# Patient Record
Sex: FEMALE | Race: WHITE | NOT HISPANIC OR LATINO | Employment: PART TIME | URBAN - METROPOLITAN AREA
[De-identification: names, ages, dates, MRNs, and addresses within clinical notes are randomized per-mention and may not be internally consistent; named-entity substitution may affect disease eponyms.]

---

## 2021-01-05 ENCOUNTER — HOSPITAL ENCOUNTER (EMERGENCY)
Facility: HOSPITAL | Age: 46
Discharge: HOME | End: 2021-01-05
Attending: EMERGENCY MEDICINE | Admitting: EMERGENCY MEDICINE
Payer: COMMERCIAL

## 2021-01-05 ENCOUNTER — APPOINTMENT (EMERGENCY)
Dept: CT IMAGING | Facility: HOSPITAL | Age: 46
End: 2021-01-05
Payer: COMMERCIAL

## 2021-01-05 VITALS
BODY MASS INDEX: 33.01 KG/M2 | RESPIRATION RATE: 18 BRPM | DIASTOLIC BLOOD PRESSURE: 71 MMHG | HEART RATE: 74 BPM | TEMPERATURE: 98.2 F | HEIGHT: 61 IN | OXYGEN SATURATION: 99 % | WEIGHT: 174.82 LBS | SYSTOLIC BLOOD PRESSURE: 115 MMHG

## 2021-01-05 DIAGNOSIS — R10.30 LOWER ABDOMINAL PAIN: Primary | ICD-10-CM

## 2021-01-05 DIAGNOSIS — R11.0 NAUSEA: ICD-10-CM

## 2021-01-05 DIAGNOSIS — K43.9 VENTRAL HERNIA WITHOUT OBSTRUCTION OR GANGRENE: ICD-10-CM

## 2021-01-05 LAB
ALBUMIN SERPL BCP-MCNC: 3.5 G/DL (ref 3.5–5)
ALP SERPL-CCNC: 80 U/L (ref 46–116)
ALT SERPL W P-5'-P-CCNC: 16 U/L (ref 12–78)
ANION GAP SERPL CALCULATED.3IONS-SCNC: 9 MMOL/L (ref 4–13)
APTT PPP: 33 SECONDS (ref 23–37)
AST SERPL W P-5'-P-CCNC: 10 U/L (ref 5–45)
BASOPHILS # BLD AUTO: 0.04 THOUSANDS/ΜL (ref 0–0.1)
BASOPHILS NFR BLD AUTO: 1 % (ref 0–1)
BILIRUB SERPL-MCNC: 0.2 MG/DL (ref 0.2–1)
BILIRUB UR QL STRIP: NEGATIVE
BUN SERPL-MCNC: 13 MG/DL (ref 5–25)
CALCIUM SERPL-MCNC: 9.1 MG/DL (ref 8.3–10.1)
CHLORIDE SERPL-SCNC: 105 MMOL/L (ref 100–108)
CLARITY UR: CLEAR
CO2 SERPL-SCNC: 26 MMOL/L (ref 21–32)
COLOR UR: YELLOW
CREAT SERPL-MCNC: 1.16 MG/DL (ref 0.6–1.3)
EOSINOPHIL # BLD AUTO: 0.15 THOUSAND/ΜL (ref 0–0.61)
EOSINOPHIL NFR BLD AUTO: 2 % (ref 0–6)
ERYTHROCYTE [DISTWIDTH] IN BLOOD BY AUTOMATED COUNT: 13.4 % (ref 11.6–15.1)
GFR SERPL CREATININE-BSD FRML MDRD: 56 ML/MIN/1.73SQ M
GLUCOSE SERPL-MCNC: 89 MG/DL (ref 65–140)
GLUCOSE UR STRIP-MCNC: NEGATIVE MG/DL
HCT VFR BLD AUTO: 40.7 % (ref 34.8–46.1)
HGB BLD-MCNC: 12.9 G/DL (ref 11.5–15.4)
HGB UR QL STRIP.AUTO: NEGATIVE
IMM GRANULOCYTES # BLD AUTO: 0.03 THOUSAND/UL (ref 0–0.2)
IMM GRANULOCYTES NFR BLD AUTO: 0 % (ref 0–2)
INR PPP: 0.94 (ref 0.84–1.19)
KETONES UR STRIP-MCNC: NEGATIVE MG/DL
LACTATE SERPL-SCNC: 0.7 MMOL/L (ref 0.5–2)
LEUKOCYTE ESTERASE UR QL STRIP: NEGATIVE
LIPASE SERPL-CCNC: 335 U/L (ref 73–393)
LYMPHOCYTES # BLD AUTO: 2.34 THOUSANDS/ΜL (ref 0.6–4.47)
LYMPHOCYTES NFR BLD AUTO: 30 % (ref 14–44)
MAGNESIUM SERPL-MCNC: 1.9 MG/DL (ref 1.6–2.6)
MCH RBC QN AUTO: 28.6 PG (ref 26.8–34.3)
MCHC RBC AUTO-ENTMCNC: 31.7 G/DL (ref 31.4–37.4)
MCV RBC AUTO: 90 FL (ref 82–98)
MONOCYTES # BLD AUTO: 0.49 THOUSAND/ΜL (ref 0.17–1.22)
MONOCYTES NFR BLD AUTO: 6 % (ref 4–12)
NEUTROPHILS # BLD AUTO: 4.72 THOUSANDS/ΜL (ref 1.85–7.62)
NEUTS SEG NFR BLD AUTO: 61 % (ref 43–75)
NITRITE UR QL STRIP: NEGATIVE
NRBC BLD AUTO-RTO: 0 /100 WBCS
PH UR STRIP.AUTO: 5.5 [PH]
PLATELET # BLD AUTO: 262 THOUSANDS/UL (ref 149–390)
PMV BLD AUTO: 10.3 FL (ref 8.9–12.7)
POTASSIUM SERPL-SCNC: 3.9 MMOL/L (ref 3.5–5.3)
PROT SERPL-MCNC: 6.9 G/DL (ref 6.4–8.2)
PROT UR STRIP-MCNC: NEGATIVE MG/DL
PROTHROMBIN TIME: 12.7 SECONDS (ref 11.6–14.5)
RBC # BLD AUTO: 4.51 MILLION/UL (ref 3.81–5.12)
SODIUM SERPL-SCNC: 140 MMOL/L (ref 136–145)
SP GR UR STRIP.AUTO: <=1.005 (ref 1–1.03)
UROBILINOGEN UR QL STRIP.AUTO: 0.2 E.U./DL
WBC # BLD AUTO: 7.77 THOUSAND/UL (ref 4.31–10.16)

## 2021-01-05 PROCEDURE — 99285 EMERGENCY DEPT VISIT HI MDM: CPT | Performed by: EMERGENCY MEDICINE

## 2021-01-05 PROCEDURE — 80053 COMPREHEN METABOLIC PANEL: CPT | Performed by: EMERGENCY MEDICINE

## 2021-01-05 PROCEDURE — 99284 EMERGENCY DEPT VISIT MOD MDM: CPT

## 2021-01-05 PROCEDURE — 85730 THROMBOPLASTIN TIME PARTIAL: CPT | Performed by: EMERGENCY MEDICINE

## 2021-01-05 PROCEDURE — 81003 URINALYSIS AUTO W/O SCOPE: CPT | Performed by: EMERGENCY MEDICINE

## 2021-01-05 PROCEDURE — 83690 ASSAY OF LIPASE: CPT | Performed by: EMERGENCY MEDICINE

## 2021-01-05 PROCEDURE — 83605 ASSAY OF LACTIC ACID: CPT | Performed by: EMERGENCY MEDICINE

## 2021-01-05 PROCEDURE — 83735 ASSAY OF MAGNESIUM: CPT | Performed by: EMERGENCY MEDICINE

## 2021-01-05 PROCEDURE — 96375 TX/PRO/DX INJ NEW DRUG ADDON: CPT

## 2021-01-05 PROCEDURE — 74177 CT ABD & PELVIS W/CONTRAST: CPT

## 2021-01-05 PROCEDURE — 96361 HYDRATE IV INFUSION ADD-ON: CPT

## 2021-01-05 PROCEDURE — 36415 COLL VENOUS BLD VENIPUNCTURE: CPT | Performed by: EMERGENCY MEDICINE

## 2021-01-05 PROCEDURE — 96376 TX/PRO/DX INJ SAME DRUG ADON: CPT

## 2021-01-05 PROCEDURE — 85025 COMPLETE CBC W/AUTO DIFF WBC: CPT | Performed by: EMERGENCY MEDICINE

## 2021-01-05 PROCEDURE — 96374 THER/PROPH/DIAG INJ IV PUSH: CPT

## 2021-01-05 PROCEDURE — 85610 PROTHROMBIN TIME: CPT | Performed by: EMERGENCY MEDICINE

## 2021-01-05 PROCEDURE — G1004 CDSM NDSC: HCPCS

## 2021-01-05 RX ORDER — ONDANSETRON 2 MG/ML
4 INJECTION INTRAMUSCULAR; INTRAVENOUS ONCE
Status: COMPLETED | OUTPATIENT
Start: 2021-01-05 | End: 2021-01-05

## 2021-01-05 RX ORDER — PROMETHAZINE HYDROCHLORIDE 25 MG/ML
25 INJECTION, SOLUTION INTRAMUSCULAR; INTRAVENOUS ONCE
Status: COMPLETED | OUTPATIENT
Start: 2021-01-05 | End: 2021-01-05

## 2021-01-05 RX ORDER — MAGNESIUM HYDROXIDE/ALUMINUM HYDROXICE/SIMETHICONE 120; 1200; 1200 MG/30ML; MG/30ML; MG/30ML
30 SUSPENSION ORAL ONCE
Status: COMPLETED | OUTPATIENT
Start: 2021-01-05 | End: 2021-01-05

## 2021-01-05 RX ORDER — MORPHINE SULFATE 4 MG/ML
4 INJECTION, SOLUTION INTRAMUSCULAR; INTRAVENOUS ONCE
Status: COMPLETED | OUTPATIENT
Start: 2021-01-05 | End: 2021-01-05

## 2021-01-05 RX ORDER — DICYCLOMINE HCL 20 MG
20 TABLET ORAL 2 TIMES DAILY
Qty: 20 TABLET | Refills: 0 | Status: SHIPPED | OUTPATIENT
Start: 2021-01-05 | End: 2021-02-17

## 2021-01-05 RX ORDER — LIDOCAINE HYDROCHLORIDE 20 MG/ML
15 SOLUTION OROPHARYNGEAL ONCE
Status: COMPLETED | OUTPATIENT
Start: 2021-01-05 | End: 2021-01-05

## 2021-01-05 RX ORDER — ONDANSETRON 4 MG/1
4 TABLET, ORALLY DISINTEGRATING ORAL EVERY 6 HOURS PRN
Qty: 20 TABLET | Refills: 0 | Status: SHIPPED | OUTPATIENT
Start: 2021-01-05 | End: 2021-02-17

## 2021-01-05 RX ORDER — DICYCLOMINE HCL 20 MG
20 TABLET ORAL ONCE
Status: COMPLETED | OUTPATIENT
Start: 2021-01-05 | End: 2021-01-05

## 2021-01-05 RX ADMIN — PROMETHAZINE HYDROCHLORIDE 25 MG: 25 INJECTION INTRAMUSCULAR; INTRAVENOUS at 16:37

## 2021-01-05 RX ADMIN — LIDOCAINE HYDROCHLORIDE 15 ML: 20 SOLUTION ORAL; TOPICAL at 17:21

## 2021-01-05 RX ADMIN — MORPHINE SULFATE 4 MG: 4 INJECTION INTRAVENOUS at 14:53

## 2021-01-05 RX ADMIN — ONDANSETRON 4 MG: 2 INJECTION INTRAMUSCULAR; INTRAVENOUS at 14:53

## 2021-01-05 RX ADMIN — MORPHINE SULFATE 4 MG: 4 INJECTION INTRAVENOUS at 16:37

## 2021-01-05 RX ADMIN — SODIUM CHLORIDE 1000 ML: 0.9 INJECTION, SOLUTION INTRAVENOUS at 14:54

## 2021-01-05 RX ADMIN — ALUMINA, MAGNESIA, AND SIMETHICONE ORAL SUSPENSION REGULAR STRENGTH 30 ML: 1200; 1200; 120 SUSPENSION ORAL at 17:21

## 2021-01-05 RX ADMIN — IOHEXOL 100 ML: 350 INJECTION, SOLUTION INTRAVENOUS at 15:47

## 2021-01-05 RX ADMIN — DICYCLOMINE HYDROCHLORIDE 20 MG: 20 TABLET ORAL at 17:21

## 2021-01-05 NOTE — ED PROVIDER NOTES
Pt Name: Chet Castleman  MRN: [id number]  [place] 1975  Age/Sex: 50 y o  female  Date of evaluation: 2021  PCP: No primary care provider on file  CHIEF COMPLAINT    Chief Complaint   Patient presents with    Abdominal Pain     reports "nasty smelly burps 1 week ago then sharp pains in my stomach to my back for 4-5 days "         HPI    50 y o  female presenting with abdominal pain  Patient states 1 week ago she started having frequent sharps that smelled foul, and over the last 4-5 days started having sharp pains in her abdomen, points towards her lower abdomen  And recently has started shooting through into her back  Reports nausea, no vomiting  Poor appetite, has been drinking liquids, but states she has not been staying hydrated  No urinary symptoms, no constipation or diarrhea, no blood in her stool or urine  No fevers or chills  No sick contacts  Patient has a history of hysterectomy, had complication with intestinal perforation then that was repaired, this was approximately 3 years ago  States she was septic at that time and her current symptoms feel similar  Past Medical and Surgical History    Past Medical History:   Diagnosis Date    Bladder perforation, intraoperative     Intestine, perforation ([address] )     intra op    Sepsis ([facility] [address] )        Past Surgical History:   Procedure Laterality Date     SECTION      HYSTERECTOMY         History reviewed  No pertinent family history  Social History     Tobacco Use    Smoking status: Current Every Day Smoker     Packs/day: 0 50    Smokeless tobacco: Never Used   Substance Use Topics    Alcohol use: Not Currently    Drug use: Never           Allergies    No Known Allergies    Home Medications    Prior to Admission medications    Not on File           Review of Systems    Review of Systems   Constitutional: Negative for chills and fever  HENT: Negative for rhinorrhea and sore throat      Eyes: Negative for pain and visual disturbance  Respiratory: Negative for cough and shortness of breath  Cardiovascular: Negative for chest pain and leg swelling  Gastrointestinal: Positive for abdominal pain and nausea  Negative for blood in stool, constipation, diarrhea and vomiting  Genitourinary: Negative for dysuria and hematuria  Musculoskeletal: Positive for back pain  Negative for myalgias  Skin: Negative for rash and wound  Neurological: Negative for syncope and headaches  Physical Exam      ED Triage Vitals   Temperature Pulse Respirations Blood Pressure SpO2   01/05/21 1413 01/05/21 1413 01/05/21 1413 01/05/21 1413 01/05/21 1413   98 2 °F (36 8 °C) 82 18 108/75 98 %      Temp Source Heart Rate Source Patient Position - Orthostatic VS BP Location FiO2 (%)   01/05/21 1413 01/05/21 1413 01/05/21 1413 01/05/21 1413 --   Oral Monitor Sitting Left arm       Pain Score       01/05/21 1453       7               Physical Exam  Constitutional:       General: She is not in acute distress  Appearance: She is not ill-appearing  HENT:      Head: Normocephalic and atraumatic  Nose: Nose normal       Mouth/Throat:      Comments: Deferred due to COVID-19 transmission risk  Eyes:      Extraocular Movements: Extraocular movements intact  Pupils: Pupils are equal, round, and reactive to light  Neck:      Musculoskeletal: Normal range of motion and neck supple  Cardiovascular:      Rate and Rhythm: Normal rate and regular rhythm  Pulmonary:      Effort: No respiratory distress  Breath sounds: Normal breath sounds  No wheezing  Abdominal:      General: There is no distension  Palpations: Abdomen is soft  Comments: Lower abdominal tenderness to palpation, left upper quadrant tenderness to palpation as well   Musculoskeletal: Normal range of motion  General: No swelling or deformity  Skin:     General: Skin is warm  Findings: No erythema     Neurological:      Mental Status: She is alert and oriented to person, place, and time  Mental status is at baseline                Diagnostic Results      Labs:    Results Reviewed     Procedure Component Value Units Date/Time    UA (URINE) with reflex to Scope [[id number]] Collected: 01/05/21 1637    Lab Status: Final result Specimen: Urine, Clean Catch Updated: 01/05/21 1649     Color, UA Yellow     Clarity, UA Clear     Specific Gravity, UA <=1 005     pH, UA 5 5     Leukocytes, UA Negative     Nitrite, UA Negative     Protein, UA Negative mg/dl      Glucose, UA Negative mg/dl      Ketones, UA Negative mg/dl      Urobilinogen, UA 0 2 E U /dl      Bilirubin, UA Negative     Blood, UA Negative    Lipase [[id number]]  (Normal) Collected: 01/05/21 1450    Lab Status: Final result Specimen: Blood from Arm, Left Updated: 01/05/21 1521     Lipase 335 u/L     Comprehensive metabolic panel [[id number]] Collected: 01/05/21 1450    Lab Status: Final result Specimen: Blood from Arm, Left Updated: 01/05/21 1521     Sodium 140 mmol/L      Potassium 3 9 mmol/L      Chloride 105 mmol/L      CO2 26 mmol/L      ANION GAP 9 mmol/L      BUN 13 mg/dL      Creatinine 1 16 mg/dL      Glucose 89 mg/dL      Calcium 9 1 mg/dL      AST 10 U/L      ALT 16 U/L      Alkaline Phosphatase 80 U/L      Total Protein 6 9 g/dL      Albumin 3 5 g/dL      Total Bilirubin 0 20 mg/dL      eGFR 56 ml/min/1 73sq m     Narrative:      Ana guidelines for Chronic Kidney Disease (CKD):     Stage 1 with normal or high GFR (GFR > 90 mL/min/1 73 square meters)    Stage 2 Mild CKD (GFR = 60-89 mL/min/1 73 square meters)    Stage 3A Moderate CKD (GFR = 45-59 mL/min/1 73 square meters)    Stage 3B Moderate CKD (GFR = 30-44 mL/min/1 73 square meters)    Stage 4 Severe CKD (GFR = 15-29 mL/min/1 73 square meters)    Stage 5 End Stage CKD (GFR <15 mL/min/1 73 square meters)  Note: GFR calculation is accurate only with a steady state creatinine    Magnesium [[id number]] (Normal) Collected: 01/05/21 1450    Lab Status: Final result Specimen: Blood from Arm, Left Updated: 01/05/21 1521     Magnesium 1 9 mg/dL     Lactic acid [[id number]]  (Normal) Collected: 01/05/21 1450    Lab Status: Final result Specimen: Blood from Arm, Left Updated: 01/05/21 1514     LACTIC ACID 0 7 mmol/L     Narrative:      Result may be elevated if tourniquet was used during collection  Protime-INR [[id number]]  (Normal) Collected: 01/05/21 1450    Lab Status: Final result Specimen: Blood from Arm, Left Updated: 01/05/21 1505     Protime 12 7 seconds      INR 0 94    APTT [[id number]]  (Normal) Collected: 01/05/21 1450    Lab Status: Final result Specimen: Blood from Arm, Left Updated: 01/05/21 1505     PTT 33 seconds     CBC and differential [[id number]] Collected: 01/05/21 1450    Lab Status: Final result Specimen: Blood from Arm, Left Updated: 01/05/21 1456     WBC 7 77 Thousand/uL      RBC 4 51 Million/uL      Hemoglobin 12 9 g/dL      Hematocrit 40 7 %      MCV 90 fL      MCH 28 6 pg      MCHC 31 7 g/dL      RDW 13 4 %      MPV 10 3 fL      Platelets 384 Thousands/uL      nRBC 0 /100 WBCs      Neutrophils Relative 61 %      Immat GRANS % 0 %      Lymphocytes Relative 30 %      Monocytes Relative 6 %      Eosinophils Relative 2 %      Basophils Relative 1 %      Neutrophils Absolute 4 72 Thousands/µL      Immature Grans Absolute 0 03 Thousand/uL      Lymphocytes Absolute 2 34 Thousands/µL      Monocytes Absolute 0 49 Thousand/µL      Eosinophils Absolute 0 15 Thousand/µL      Basophils Absolute 0 04 Thousands/µL           All labs reviewed and utilized in the medical decision making process    Radiology:    CT abdomen pelvis with contrast   Final Result      1  No acute CT finding within abdomen or pelvis  2   Moderate to large colonic stool burden  3   Fat-containing heterogeneous mesenteric mass in the anterior mid lower abdomen along the undersurface of rectus muscle    There is no prior outside imaging available to assess interval change  Per outside imaging reports available in Epic this lesion    has been previously biopsied  Correlate with pathology report  4   Large left paramedian lower abdominal wall fat-containing ventral hernia  Workstation performed: [id number]             All radiology studies independently viewed by me and interpreted by the radiologist     Procedure    Procedures        MDM    Vitals reassuring, concern for acute intra-abdominal pathology, especially with her history  Pancreatitis and gastritis are also considered  Give IV morphine IV Zofran for pain  Will give IV fluids  Will obtain blood work, urinalysis, CT abdomen/pelvis  ED Course as of Jan 05 1729   Tue Jan 05, 2021   1657  Fat-containing heterogeneous density is seen in the anterior mid lower abdomen along undersurface of rectus muscle  It measures approximately 3 x 5 1 x 6 7 cm (AP by transverse by craniocaudal)  Left paramedian lower abdominal wall fat-containing ventral hernia with fascial defect measuring approximately 2 8 cm  CT abdomen pelvis with contrast   1658 On care everywhere, the mass measured "5 5 cm x 3 6 cm x 7 8 cm" on 11/27/2019  Therefore size is similar  And per surgery note on 1/13/2020, the mass was biopsied, and is benign  She was advised to have her hernia repaired, and per the note they suggested the mass could be excised during the same operation  [phone] Patient updated with findings, states she does not have a surgeon at [facility], would like surgery referral here  She is feeling much better  Will prescribe dicyclomine and Zofran  Follow-up with surgery  ED return precautions discussed, patient verbalized understanding and is in agreement with this plan             Medications   morphine (PF) 4 mg/mL injection 4 mg (4 mg Intravenous Given 1/5/21 1453)   ondansetron (ZOFRAN) injection 4 mg (4 mg Intravenous Given 1/5/21 1453)   sodium chloride 0 9 % bolus 1,000 mL (0 mL Intravenous Stopped 1/5/21 1612)   iohexol (OMNIPAQUE) 350 MG/ML injection (MULTI-DOSE) 100 mL (100 mL Intravenous Given 1/5/21 1547)   morphine (PF) 4 mg/mL injection 4 mg (4 mg Intravenous Given 1/5/21 1637)   promethazine (PHENERGAN) injection 25 mg (25 mg Intravenous Given 1/5/21 1637)   aluminum-magnesium hydroxide-simethicone (MYLANTA) oral suspension 30 mL (30 mL Oral Given 1/5/21 1721)   Lidocaine Viscous HCl (XYLOCAINE) 2 % mucosal solution 15 mL (15 mL Swish & Swallow Given 1/5/21 1721)   dicyclomine (BENTYL) tablet 20 mg (20 mg Oral Given 1/5/21 1721)           FINAL IMPRESSION    Final diagnoses:   Lower abdominal pain   Ventral hernia without obstruction or gangrene   Nausea         DISPOSITION    Time reflects when diagnosis was documented in both MDM as applicable and the Disposition within this note     Time User Action Codes Description Comment    1/5/2021  5:06 PM Alline Peak Add [R10 30] Lower abdominal pain     1/5/2021  5:06 PM Alline Peak Add [K43 9] Ventral hernia without obstruction or gangrene     1/5/2021  5:14 PM Alline Peak Add [R11 0] Nausea       ED Disposition     ED Disposition Condition Date/Time Comment    Discharge Stable Tue Jan 5, 2021  5:06 PM [facility] discharge to home/self care              Follow-up Information     Follow up With Specialties Details Why Contact Info    Radha Little MD General Surgery Call in 1 day  [place] [address]  [phone]              PATIENT REFERRED TO:    Radha Little MD  [address] [address]  [phone]    Call in 1 day        DISCHARGE MEDICATIONS:    Patient's Medications   Discharge Prescriptions    DICYCLOMINE (BENTYL) 20 MG TABLET    Take 1 tablet (20 mg total) by mouth 2 (two) times a day       Start Date: 1/5/2021  End Date: --       Order Dose: 20 mg       Quantity: 20 tablet    Refills: 0    ONDANSETRON (ZOFRAN-ODT) 4 MG DISINTEGRATING TABLET Take 1 tablet (4 mg total) by mouth every 6 (six) hours as needed for nausea or vomiting       Start Date: 1/5/2021  End Date: --       Order Dose: 4 mg       Quantity: 20 tablet    Refills: 0       No discharge procedures on file  Jovani Reese DO        This note was partially completed using voice recognition technology, and was scanned for gross errors; however some errors may still exist  Please contact the author with any questions or requests for clarification        Jovani Reese DO  01/05/21 2415

## 2021-01-05 NOTE — ED NOTES
Pt ambulated to the restroom to provide a urine sample   Pt had a steady gait and did not require any assistance      Ania Barnard RN  01/05/21 9624

## 2021-01-12 ENCOUNTER — OFFICE VISIT (OUTPATIENT)
Dept: SURGERY | Facility: CLINIC | Age: 46
End: 2021-01-12
Payer: COMMERCIAL

## 2021-01-12 VITALS
HEART RATE: 87 BPM | DIASTOLIC BLOOD PRESSURE: 68 MMHG | HEIGHT: 61 IN | SYSTOLIC BLOOD PRESSURE: 102 MMHG | TEMPERATURE: 97.6 F | BODY MASS INDEX: 33.72 KG/M2 | WEIGHT: 178.6 LBS

## 2021-01-12 DIAGNOSIS — Z00.00 ENCOUNTER FOR MEDICAL EXAMINATION TO ESTABLISH CARE: ICD-10-CM

## 2021-01-12 DIAGNOSIS — K59.01 CONSTIPATION BY DELAYED COLONIC TRANSIT: Primary | ICD-10-CM

## 2021-01-12 PROCEDURE — 99203 OFFICE O/P NEW LOW 30 MIN: CPT | Performed by: SURGERY

## 2021-01-12 RX ORDER — PREDNISONE 20 MG/1
TABLET ORAL
COMMUNITY
Start: 2020-12-19 | End: 2021-02-04

## 2021-01-12 RX ORDER — TOPIRAMATE 50 MG/1
50 TABLET, FILM COATED ORAL DAILY
COMMUNITY
Start: 2021-01-03 | End: 2022-06-09

## 2021-01-12 RX ORDER — TOPIRAMATE 25 MG/1
25 TABLET ORAL DAILY
COMMUNITY
Start: 2021-01-03 | End: 2022-06-09

## 2021-01-12 RX ORDER — TRAZODONE HYDROCHLORIDE 100 MG/1
TABLET ORAL
COMMUNITY
Start: 2020-12-01 | End: 2022-06-09

## 2021-01-12 RX ORDER — DULOXETIN HYDROCHLORIDE 60 MG/1
60 CAPSULE, DELAYED RELEASE ORAL DAILY
COMMUNITY
Start: 2020-12-01 | End: 2022-06-09

## 2021-01-12 RX ORDER — AZITHROMYCIN 250 MG/1
TABLET, FILM COATED ORAL
COMMUNITY
Start: 2020-12-19 | End: 2021-02-04

## 2021-01-12 RX ORDER — CLONAZEPAM 1 MG/1
TABLET ORAL
COMMUNITY
Start: 2021-01-03 | End: 2022-06-09

## 2021-01-12 RX ORDER — OLANZAPINE 5 MG/1
5 TABLET ORAL
COMMUNITY
Start: 2020-11-04 | End: 2021-02-17

## 2021-01-12 NOTE — PROGRESS NOTES
Assessment/Plan:       1  Constipation by delayed colonic transit  -     Ambulatory referral to Gastroenterology; Future    2  Encounter for medical examination to establish care  -     Ambulatory referral to [facility]; Future      After her GI eval and colonoscopy and establishing PCP for medical exam I will see her back for scheduling her incisional hernia repair  Subjective: Ventral Hernia     Patient ID: Stephen Robert is a 50 y o  female  Triage Notes:    Mrs Winnie Mai is presenting with a ventral hernia  Two sections - last in   And hysterectomy for endometriosis was 3 years ago and during that surgery there was a bowel and bladder injury that was repaired  She was returned to surgery POD2 or 3 with ?anastamotic leak and feculent peritonitis with sepsis and ICU stay - total of 25 day stay at [facility]  For the past several months she has noted lower abdominal pain and bulging and was seen in the ED for this  Has not seen a PCP in over a year due to loss of insurance  Treated for bronchitis a few weeks ago and those symptoms have improved  Smokes 1/2 PPD  Has been constipated for many years - takes laxatives for about a week without any effect  Drinks a lot of water and       The following portions of the patient's history were reviewed and updated as appropriate:   She  has a past medical history of Bladder perforation, intraoperative, Intestine, perforation ([address] ), and Sepsis ([facility] [address] )  She There are no active problems to display for this patient  She  has a past surgical history that includes Hysterectomy and  section  Her family history is not on file  She  reports that she has been smoking  She has been smoking about 0 50 packs per day  She has never used smokeless tobacco  She reports previous alcohol use  She reports that she does not use drugs    Current Outpatient Medications on File Prior to Visit   Medication Sig    dicyclomine (BENTYL) 20 mg tablet Take 1 tablet (20 mg total) by mouth 2 (two) times a day    ondansetron (ZOFRAN-ODT) 4 mg disintegrating tablet Take 1 tablet (4 mg total) by mouth every 6 (six) hours as needed for nausea or vomiting     No current facility-administered medications on file prior to visit  She has No Known Allergies       Review of Systems   All other systems reviewed and are negative  Objective:    /68   Pulse 87   Temp 97 6 °F (36 4 °C) (Temporal)   Ht 5' 1" (1 549 m)   Wt 81 kg (178 lb 9 6 oz)   Breastfeeding No   BMI 33 75 kg/m²     Below is the patient's most recent value for Albumin, ALT, AST, BUN, Calcium, Chloride, Cholesterol, CO2, Creatinine, GFR, Glucose, HDL, Hematocrit, Hemoglobin, Hemoglobin A1C, LDL, Magnesium, Phosphorus, Platelets, Potassium, PSA, Sodium, Triglycerides, and WBC  Lab Results   Component Value Date    ALT 16 01/05/2021    AST 10 01/05/2021    BUN 13 01/05/2021    CALCIUM 9 1 01/05/2021     01/05/2021    CO2 26 01/05/2021    CREATININE 1 16 01/05/2021    HCT 40 7 01/05/2021    HGB 12 9 01/05/2021    MG 1 9 01/05/2021     01/05/2021    K 3 9 01/05/2021    WBC 7 77 01/05/2021     Note: for a comprehensive list of the patient's lab results, access the Results Review activity  Physical Exam  Vitals signs and nursing note reviewed  Constitutional:       General: She is not in acute distress  Appearance: She is well-developed  She is not diaphoretic  HENT:      Head: Normocephalic and atraumatic  Eyes:      General: No scleral icterus  Pupils: Pupils are equal, round, and reactive to light  Neck:      Musculoskeletal: Normal range of motion and neck supple  Cardiovascular:      Rate and Rhythm: Normal rate  Pulmonary:      Effort: Pulmonary effort is normal    Abdominal:      Palpations: Abdomen is soft  Hernia: A hernia (infraumbilical midline and sac protruding to the left  no skin changes  mildly tender  ) is present     Musculoskeletal: Normal range of motion  Skin:     General: Skin is warm and dry  Neurological:      Mental Status: She is alert and oriented to person, place, and time  Psychiatric:         Mood and Affect: Mood normal          Behavior: Behavior normal          Thought Content:  Thought content normal          Judgment: Judgment normal              Procedures

## 2021-01-13 ENCOUNTER — TELEPHONE (OUTPATIENT)
Dept: SURGERY | Facility: CLINIC | Age: 46
End: 2021-01-13

## 2021-01-13 ENCOUNTER — OFFICE VISIT (OUTPATIENT)
Dept: GASTROENTEROLOGY | Facility: CLINIC | Age: 46
End: 2021-01-13
Payer: COMMERCIAL

## 2021-01-13 VITALS
SYSTOLIC BLOOD PRESSURE: 100 MMHG | BODY MASS INDEX: 33.04 KG/M2 | WEIGHT: 175 LBS | HEART RATE: 87 BPM | DIASTOLIC BLOOD PRESSURE: 70 MMHG | HEIGHT: 61 IN

## 2021-01-13 DIAGNOSIS — K59.01 CONSTIPATION BY DELAYED COLONIC TRANSIT: ICD-10-CM

## 2021-01-13 PROCEDURE — 99203 OFFICE O/P NEW LOW 30 MIN: CPT | Performed by: PHYSICIAN ASSISTANT

## 2021-01-13 NOTE — PROGRESS NOTES
[facility] - Outpatient Consultation  Chitra Chavez 50 y o  female MRN: [id number]  Encounter: [id number]          ASSESSMENT AND PLAN:      1  Constipation by delayed colonic transit  Chronic with exacerbation over the past few weeks  Went to the ER 1/5 with severe pain  CT showed severe constipation, ventral hernia and pelvic mass containing fat of unclear significance    Advised 2 fleets enemas followed by 2 bottles of magnesium citrate both with at least 16 oz of water  After this start MiraLax 1 cap full twice daily  Up date colonoscopy as last was approximately 10 years ago    She is going to be scheduled for surgery to repair the ventral hernia and excise the pelvic mass  ______________________________________________________________________    HPI:  42-year-old female presents for evaluation of abdominal pain and constipation  She reports she has suffer with constipation for most of her adult life  Recently the symptom has exacerbated  She reports she has not had a bowel movement approximately 3 weeks  On January 5th she went to [facility] room due to abdominal pain  CT showed a large burden of stool in the colon  She was also found to have a ventral hernia and a pelvic mass containing fat  She followed up with Dr Her payton yesterday  Plan is to take the patient to the operating room to repair the ventral hernia and excise the mass  Patient reports that she has had that mass for some time  It has been biopsied in the past however she does not remember with the results of this showed  She knows that it was benign  She is unsure if it has grown  She reports that over the past few weeks she has been taking Metamucil without relief in her constipation  She has never taken anything consistently for constipation  She denies any rectal bleeding or unexpected weight loss  She is having nausea and bloating because of the constipation        REVIEW OF SYSTEMS:    CONSTITUTIONAL: Denies any fever, chills, rigors, and weight loss  HEENT: No earache or tinnitus  Denies hearing loss or visual disturbances  CARDIOVASCULAR: No chest pain or palpitations  RESPIRATORY: Denies any cough, hemoptysis, shortness of breath or dyspnea on exertion  GASTROINTESTINAL: As noted in the History of Present Illness  GENITOURINARY: No problems with urination  Denies any hematuria or dysuria  NEUROLOGIC: No dizziness or vertigo, denies headaches  MUSCULOSKELETAL: Denies any muscle or joint pain  SKIN: Denies skin rashes or itching  ENDOCRINE: Denies excessive thirst  Denies intolerance to heat or cold  PSYCHOSOCIAL: Denies depression or anxiety  Denies any recent memory loss         Historical Information   Past Medical History:   Diagnosis Date    Bladder perforation, intraoperative     Intestine, perforation ([facility] [address] )     intra op    [facility]      Past Surgical History:   Procedure Laterality Date     SECTION      HYSTERECTOMY       Social History   Social History     Substance and Sexual Activity   Alcohol Use Not Currently     Social History     Substance and Sexual Activity   Drug Use Never     Social History     Tobacco Use   Smoking Status Current Every Day Smoker    Packs/day: 0 50   Smokeless Tobacco Never Used     Family History   Problem Relation Age of Onset    Cancer Father        Meds/Allergies       Current Outpatient Medications:     clonazePAM (KlonoPIN) 1 mg tablet    DULoxetine (CYMBALTA) 60 mg delayed release capsule    ondansetron (ZOFRAN-ODT) 4 mg disintegrating tablet    topiramate (TOPAMAX) 25 mg tablet    topiramate (TOPAMAX) 50 MG tablet    traZODone (DESYREL) 100 mg tablet    azithromycin (ZITHROMAX) 250 mg tablet    dicyclomine (BENTYL) 20 mg tablet    OLANZapine (ZyPREXA) 5 mg tablet    predniSONE 20 mg tablet    No Known Allergies        Objective     Blood pressure 100/70, pulse 87, height 5' 1" (1 549 m), weight 79 4 kg (175 lb), not currently breastfeeding  Body mass index is 33 07 kg/m²  PHYSICAL EXAM:      General Appearance:   Alert, cooperative, no distress   HEENT:   Normocephalic, atraumatic, anicteric      Neck:  Supple, symmetrical, trachea midline   Lungs:   Clear to auscultation bilaterally; no rales, rhonchi or wheezing; respirations unlabored    Heart[de-identified]   Regular rate and rhythm; no murmur, rub, or gallop  Abdomen:   Soft, non-tender, non-distended; normal bowel sounds; no masses, no organomegaly    Genitalia:   Deferred    Rectal:   Deferred    Extremities:  No cyanosis, clubbing or edema    Pulses:  2+ and symmetric    Skin:  No jaundice, rashes, or lesions    Lymph nodes:  No palpable cervical lymphadenopathy        Lab Results:   No visits with results within 1 Day(s) from this visit     Latest known visit with results is:   Admission on 01/05/2021, Discharged on 01/05/2021   Component Date Value    WBC 01/05/2021 7 77     RBC 01/05/2021 4 51     Hemoglobin 01/05/2021 12 9     Hematocrit 01/05/2021 40 7     MCV 01/05/2021 90     MCH 01/05/2021 28 6     MCHC 01/05/2021 31 7     RDW 01/05/2021 13 4     MPV 01/05/2021 10 3     Platelets 58/68/7130 262     nRBC 01/05/2021 0     Neutrophils Relative 01/05/2021 61     Immat GRANS % 01/05/2021 0     Lymphocytes Relative 01/05/2021 30     Monocytes Relative 01/05/2021 6     Eosinophils Relative 01/05/2021 2     Basophils Relative 01/05/2021 1     Neutrophils Absolute 01/05/2021 4 72     Immature Grans Absolute 01/05/2021 0 03     Lymphocytes Absolute 01/05/2021 2 34     Monocytes Absolute 01/05/2021 0 49     Eosinophils Absolute 01/05/2021 0 15     Basophils Absolute 01/05/2021 0 04     Protime 01/05/2021 12 7     INR 01/05/2021 0 94     PTT 01/05/2021 33     Sodium 01/05/2021 140     Potassium 01/05/2021 3 9     Chloride 01/05/2021 105     CO2 01/05/2021 26     ANION GAP 01/05/2021 9     BUN 01/05/2021 13     Creatinine 01/05/2021 1 16     Glucose 01/05/2021 89     Calcium 01/05/2021 9 1     AST 01/05/2021 10     ALT 01/05/2021 16     Alkaline Phosphatase 01/05/2021 80     Total Protein 01/05/2021 6 9     Albumin 01/05/2021 3 5     Total Bilirubin 01/05/2021 0 20     eGFR 01/05/2021 56     Magnesium 01/05/2021 1 9     Lipase 01/05/2021 335     LACTIC ACID 01/05/2021 0 7     Color, UA 01/05/2021 Yellow     Clarity, UA 01/05/2021 Clear     Specific Gravity, UA 01/05/2021 <=1 005     pH, UA 01/05/2021 5 5     Leukocytes, UA 01/05/2021 Negative     Nitrite, UA 01/05/2021 Negative     Protein, UA 01/05/2021 Negative     Glucose, UA 01/05/2021 Negative     Ketones, UA 01/05/2021 Negative     Urobilinogen, UA 01/05/2021 0 2     Bilirubin, UA 01/05/2021 Negative     Blood, UA 01/05/2021 Negative          Radiology Results:   Ct Abdomen Pelvis With Contrast    Result Date: 1/5/2021  Narrative: CT ABDOMEN AND PELVIS WITH IV CONTRAST INDICATION:   Abdominal pain, acute, nonlocalized lower abdominal pain shooting to back, hx of hyster with complication iclusing intestinal perf  COMPARISON:  None  TECHNIQUE:  CT examination of the abdomen and pelvis was performed  Axial, sagittal, and coronal 2D reformatted images were created from the source data and submitted for interpretation  Radiation dose length product (DLP) for this visit:  853 mGy-cm   This examination, like all CT scans performed in the [facility], was performed utilizing techniques to minimize radiation dose exposure, including the use of iterative reconstruction and automated exposure control  IV Contrast:  100 mL of iohexol (OMNIPAQUE) Enteric Contrast:  Enteric contrast was not administered  FINDINGS: ABDOMEN LOWER CHEST:  No clinically significant abnormality identified in the visualized lower chest  LIVER/BILIARY TREE:  Unremarkable  GALLBLADDER:  No calcified gallstones  No pericholecystic inflammatory change  SPLEEN:  Unremarkable  PANCREAS:  Unremarkable  ADRENAL GLANDS:  Unremarkable  KIDNEYS/URETERS:  Unremarkable  No hydronephrosis  STOMACH AND BOWEL: Changes from prior partial small bowel resection in the distal small bowel  No obstruction  Moderate to large colonic stool burden  APPENDIX:  A normal appendix was visualized  ABDOMINOPELVIC CAVITY:  Fat-containing heterogeneous density is seen in the anterior mid lower abdomen along undersurface of rectus muscle  It measures approximately 3 x 5 1 x 6 7 cm (AP by transverse by craniocaudal)  VESSELS:  Unremarkable for patient's age  PELVIS REPRODUCTIVE ORGANS:  Status post hysterectomy  URINARY BLADDER:  Urinary bladder is distended and unremarkable  ABDOMINAL WALL/INGUINAL REGIONS:  Left paramedian lower abdominal wall fat-containing ventral hernia with fascial defect measuring approximately 2 8 cm  OSSEOUS STRUCTURES:  No acute fracture or destructive osseous lesion  Impression: 1  No acute CT finding within abdomen or pelvis  2   Moderate to large colonic stool burden  3   Fat-containing heterogeneous mesenteric mass in the anterior mid lower abdomen along the undersurface of rectus muscle  There is no prior outside imaging available to assess interval change  Per outside imaging reports available in [facility] this lesion has been previously biopsied  Correlate with pathology report  4   Large left paramedian lower abdominal wall fat-containing ventral hernia   Workstation performed: [id number]

## 2021-01-14 ENCOUNTER — TELEPHONE (OUTPATIENT)
Dept: SURGERY | Facility: CLINIC | Age: 46
End: 2021-01-14

## 2021-02-03 ENCOUNTER — ANESTHESIA EVENT (OUTPATIENT)
Dept: GASTROENTEROLOGY | Facility: HOSPITAL | Age: 46
End: 2021-02-03

## 2021-02-04 ENCOUNTER — ANESTHESIA (OUTPATIENT)
Dept: GASTROENTEROLOGY | Facility: HOSPITAL | Age: 46
End: 2021-02-04

## 2021-02-04 ENCOUNTER — HOSPITAL ENCOUNTER (OUTPATIENT)
Dept: GASTROENTEROLOGY | Facility: HOSPITAL | Age: 46
Setting detail: OUTPATIENT SURGERY
Discharge: HOME | End: 2021-02-04
Attending: INTERNAL MEDICINE
Payer: COMMERCIAL

## 2021-02-04 VITALS
HEART RATE: 61 BPM | SYSTOLIC BLOOD PRESSURE: 104 MMHG | OXYGEN SATURATION: 12 % | DIASTOLIC BLOOD PRESSURE: 55 MMHG | BODY MASS INDEX: 32.55 KG/M2 | WEIGHT: 172.4 LBS | HEIGHT: 61 IN | RESPIRATION RATE: 16 BRPM | TEMPERATURE: 97.4 F

## 2021-02-04 VITALS — HEART RATE: 69 BPM

## 2021-02-04 DIAGNOSIS — K59.01 CONSTIPATION BY DELAYED COLONIC TRANSIT: ICD-10-CM

## 2021-02-04 PROCEDURE — 45385 COLONOSCOPY W/LESION REMOVAL: CPT | Performed by: INTERNAL MEDICINE

## 2021-02-04 PROCEDURE — 88305 TISSUE EXAM BY PATHOLOGIST: CPT | Performed by: PATHOLOGY

## 2021-02-04 RX ORDER — PROPOFOL 10 MG/ML
INJECTION, EMULSION INTRAVENOUS AS NEEDED
Status: DISCONTINUED | OUTPATIENT
Start: 2021-02-04 | End: 2021-02-04

## 2021-02-04 RX ORDER — SODIUM CHLORIDE, SODIUM LACTATE, POTASSIUM CHLORIDE, CALCIUM CHLORIDE 600; 310; 30; 20 MG/100ML; MG/100ML; MG/100ML; MG/100ML
125 INJECTION, SOLUTION INTRAVENOUS CONTINUOUS
Status: DISCONTINUED | OUTPATIENT
Start: 2021-02-04 | End: 2021-02-08 | Stop reason: HOSPADM

## 2021-02-04 RX ADMIN — PROPOFOL 20 MG: 10 INJECTION, EMULSION INTRAVENOUS at 10:33

## 2021-02-04 RX ADMIN — PROPOFOL 20 MG: 10 INJECTION, EMULSION INTRAVENOUS at 10:40

## 2021-02-04 RX ADMIN — PROPOFOL 100 MG: 10 INJECTION, EMULSION INTRAVENOUS at 10:31

## 2021-02-04 RX ADMIN — SODIUM CHLORIDE, SODIUM LACTATE, POTASSIUM CHLORIDE, AND CALCIUM CHLORIDE: .6; .31; .03; .02 INJECTION, SOLUTION INTRAVENOUS at 10:29

## 2021-02-04 RX ADMIN — PROPOFOL 20 MG: 10 INJECTION, EMULSION INTRAVENOUS at 10:36

## 2021-02-04 NOTE — ANESTHESIA POSTPROCEDURE EVALUATION
Post-Op Assessment Note    CV Status:  Stable    Pain management: adequate     Mental Status:  Alert and awake   Hydration Status:  Euvolemic   PONV Controlled:  Controlled   Airway Patency:  Patent      Post Op Vitals Reviewed: Yes      Staff: CRNA         No complications documented      BP  123/66   Temp   97 6   Pulse  68   Resp   18   SpO2   100

## 2021-02-04 NOTE — DISCHARGE INSTRUCTIONS
Colonoscopy   WHAT YOU NEED TO KNOW:   A colonoscopy is a procedure to examine the inside of your colon (intestine) with a scope  Polyps or tissue growths may have been removed during your colonoscopy  It is normal to feel bloated and to have some abdominal discomfort  You should be passing gas  If you have hemorrhoids or you had polyps removed, you may have a small amount of bleeding  DISCHARGE INSTRUCTIONS:   Seek care immediately if:   · You have a large amount of bright red blood in your bowel movements  · Your abdomen is hard and firm and you have severe pain  · You have sudden trouble breathing  Contact your healthcare provider if:   · You develop a rash or hives  · You have a fever within 24 hours of your procedure       · You have not had a bowel movement for 3 days after your procedure  · You have questions or concerns about your condition or care  Activity:   · Do not lift, strain, or run  for 3 days after your procedure  · Rest after your procedure  You have been given medicine to relax you  Do not  drive or make important decisions until the day after your procedure  Return to your normal activity as directed  · Relieve gas and discomfort from bloating  by lying on your right side with a heating pad on your abdomen  You may need to take short walks to help the gas move out  Eat small meals until bloating is relieved  If you had polyps removed: For 7 days after your procedure:  · Do not  take aspirin  · Do not  go on long car rides  Follow up with your healthcare provider as directed:  Write down your questions so you remember to ask them during your visits  © 2017 [employer] is for End User's use only and may not be sold, redistributed or otherwise used for commercial purposes  All illustrations and images included in CareNotes® are the copyrighted property of [employer]  or Douglas Andrews    The above information is an  only  It is not intended as medical advice for individual conditions or treatments  Talk to your doctor, nurse or pharmacist before following any medical regimen to see if it is safe and effective for you

## 2021-02-04 NOTE — ANESTHESIA PREPROCEDURE EVALUATION
Procedure:  COLONOSCOPY    Relevant Problems   No relevant active problems        Physical Exam    Airway    Mallampati score: II  TM Distance: >3 FB  Neck ROM: full     Dental       Cardiovascular  Cardiovascular exam normal    Pulmonary  Pulmonary exam normal     Other Findings     47-year-old female presents for evaluation of abdominal pain and constipation  She reports she has suffer with constipation for most of her adult life  Recently the symptom has exacerbated  She reports she has not had a bowel movement approximately 3 weeks  On January 5th she went to [facility] room due to abdominal pain  CT showed a large burden of stool in the colon  She was also found to have a ventral hernia and a pelvic mass containing fat  She followed up with Dr Her payton yesterday  Plan is to take the patient to the operating room to repair the ventral hernia and excise the mass  Patient reports that she has had that mass for some time  It has been biopsied in the past however she does not remember with the results of this showed  She knows that it was benign  She is unsure if it has grown  She reports that over the past few weeks she has been taking Metamucil without relief in her constipation  She has never taken anything consistently for constipation  She denies any rectal bleeding or unexpected weight loss  She is having nausea and bloating because of the constipation        Anesthesia Plan  ASA Score- 2     Anesthesia Type- IV sedation with anesthesia with ASA Monitors  Additional Monitors:   Airway Plan:           Plan Factors-Exercise tolerance (METS): >4 METS  Chart reviewed  Existing labs reviewed  Patient summary reviewed  Induction- intravenous  Postoperative Plan-     Informed Consent- Anesthetic plan and risks discussed with patient  I personally reviewed this patient with the CRNA  Discussed and agreed on the Anesthesia Plan with the HILL Liriano

## 2021-02-04 NOTE — H&P
History and Physical -  [facility]  Kana Amador 50 y o  female MRN: [id number]      HPI: Kana Amador is a 50y o  year old female who presents for evaluation of constipation      REVIEW OF SYSTEMS: Per the HPI, and otherwise unremarkable  Historical Information   Past Medical History:   Diagnosis Date    Bladder perforation, intraoperative     Intestine, perforation ([facility])     intra op    Sepsis ([facility] [address] )      Past Surgical History:   Procedure Laterality Date     SECTION      HYSTERECTOMY       Social History   Social History     Substance and Sexual Activity   Alcohol Use Not Currently     Social History     Substance and Sexual Activity   Drug Use Never     Social History     Tobacco Use   Smoking Status Current Every Day Smoker    Packs/day: 0 50   Smokeless Tobacco Never Used     Family History   Problem Relation Age of Onset    Cancer Father        Meds/Allergies     (Not in a hospital admission)      No Known Allergies    Objective     not currently breastfeeding  PHYSICAL EXAM    Gen: NAD  CV: RRR  CHEST: Clear  ABD: soft, NT/ND  EXT: no edema      ASSESSMENT/PLAN:  This is a 50y o  year old female here for colonoscopy, and she is stable and optimized for her procedure

## 2021-02-08 ENCOUNTER — TELEPHONE (OUTPATIENT)
Dept: GASTROENTEROLOGY | Facility: CLINIC | Age: 46
End: 2021-02-08

## 2021-02-08 NOTE — TELEPHONE ENCOUNTER
----- Message from John Jimenez DO sent at 2/5/2021  5:29 PM EST -----  Please call the results to the patient  The patient had a adenoma in the cecum and a benign hyperplastic polyp in the transverse colon    Based on these findings, the patient will be due for repeat colonoscopy in 5 years

## 2021-02-17 ENCOUNTER — OFFICE VISIT (OUTPATIENT)
Dept: INTERNAL MEDICINE CLINIC | Facility: CLINIC | Age: 46
End: 2021-02-17
Payer: COMMERCIAL

## 2021-02-17 VITALS
TEMPERATURE: 97.9 F | HEIGHT: 61 IN | WEIGHT: 178 LBS | BODY MASS INDEX: 33.61 KG/M2 | DIASTOLIC BLOOD PRESSURE: 64 MMHG | HEART RATE: 70 BPM | SYSTOLIC BLOOD PRESSURE: 98 MMHG

## 2021-02-17 DIAGNOSIS — E55.9 VITAMIN D DEFICIENCY: ICD-10-CM

## 2021-02-17 DIAGNOSIS — Z01.818 PRE-OP EXAMINATION: Primary | ICD-10-CM

## 2021-02-17 DIAGNOSIS — F33.3 MDD (MAJOR DEPRESSIVE DISORDER), RECURRENT, SEVERE, WITH PSYCHOSIS (HCC): ICD-10-CM

## 2021-02-17 DIAGNOSIS — M51.36 DISC DEGENERATION, LUMBAR: ICD-10-CM

## 2021-02-17 DIAGNOSIS — Z11.4 ENCOUNTER FOR SCREENING FOR HIV: ICD-10-CM

## 2021-02-17 DIAGNOSIS — E66.9 OBESITY (BMI 30-39.9): ICD-10-CM

## 2021-02-17 DIAGNOSIS — Z12.31 ENCOUNTER FOR SCREENING MAMMOGRAM FOR BREAST CANCER: ICD-10-CM

## 2021-02-17 DIAGNOSIS — M79.7 FIBROMYALGIA: ICD-10-CM

## 2021-02-17 DIAGNOSIS — K43.2 INCISIONAL HERNIA, WITHOUT OBSTRUCTION OR GANGRENE: ICD-10-CM

## 2021-02-17 DIAGNOSIS — F17.210 CIGARETTE NICOTINE DEPENDENCE WITHOUT COMPLICATION: ICD-10-CM

## 2021-02-17 DIAGNOSIS — K59.04 CHRONIC IDIOPATHIC CONSTIPATION: ICD-10-CM

## 2021-02-17 DIAGNOSIS — Z13.6 SCREENING FOR CARDIOVASCULAR CONDITION: ICD-10-CM

## 2021-02-17 DIAGNOSIS — F41.1 GENERALIZED ANXIETY DISORDER: ICD-10-CM

## 2021-02-17 PROBLEM — F45.9 SOMATOFORM DISORDER, UNSPECIFIED: Status: ACTIVE | Noted: 2019-12-30

## 2021-02-17 PROBLEM — M19.90 ARTHRITIS: Status: ACTIVE | Noted: 2019-12-15

## 2021-02-17 PROBLEM — K59.00 CONSTIPATION: Status: ACTIVE | Noted: 2020-01-03

## 2021-02-17 PROBLEM — F12.10 CANNABIS ABUSE, DAILY USE: Status: RESOLVED | Noted: 2019-12-31 | Resolved: 2021-02-17

## 2021-02-17 PROBLEM — Z72.0 TOBACCO ABUSE: Status: ACTIVE | Noted: 2019-08-27

## 2021-02-17 PROBLEM — R51.9 HEAD ACHE: Status: ACTIVE | Noted: 2019-12-15

## 2021-02-17 PROBLEM — Z72.0 TOBACCO ABUSE: Status: RESOLVED | Noted: 2019-08-27 | Resolved: 2021-02-17

## 2021-02-17 PROBLEM — M54.81 BILATERAL OCCIPITAL NEURALGIA: Status: ACTIVE | Noted: 2021-02-17

## 2021-02-17 PROBLEM — F12.10 CANNABIS ABUSE, DAILY USE: Status: ACTIVE | Noted: 2019-12-31

## 2021-02-17 PROBLEM — R51.9 HEAD ACHE: Status: RESOLVED | Noted: 2019-12-15 | Resolved: 2021-02-17

## 2021-02-17 PROBLEM — M54.81 BILATERAL OCCIPITAL NEURALGIA: Status: RESOLVED | Noted: 2021-02-17 | Resolved: 2021-02-17

## 2021-02-17 PROBLEM — R20.0 NUMBNESS: Status: ACTIVE | Noted: 2019-12-15

## 2021-02-17 PROBLEM — M51.369 DISC DEGENERATION, LUMBAR: Status: ACTIVE | Noted: 2021-02-17

## 2021-02-17 PROCEDURE — 93000 ELECTROCARDIOGRAM COMPLETE: CPT | Performed by: INTERNAL MEDICINE

## 2021-02-17 PROCEDURE — 99204 OFFICE O/P NEW MOD 45 MIN: CPT | Performed by: INTERNAL MEDICINE

## 2021-02-17 RX ORDER — TRAMADOL HYDROCHLORIDE 50 MG/1
100 TABLET ORAL EVERY 8 HOURS PRN
COMMUNITY
End: 2021-07-06

## 2021-02-17 NOTE — PROGRESS NOTES
INTERNAL MEDICINE OFFICE VISIT  [facility], [place]  [address], [address] [facility] [place]  Tel: [phone]      NAME: Reina Byrnes  AGE: 50 y o  SEX: female  : 1975   MRN: [id number]    DATE: 2021  TIME: 6:19 PM      Assessment and Plan:  1  Pre-op examination   labs were ordered  EKG done in the office was within normal limits  Exam was normal and she seems to be medically stable for surgery but I would like to take a look at the labs  Also, she is a smoker and was advised to quit smoking  - POCT ECG    2  Incisional hernia, without obstruction or gangrene    Will be going for surgery soon, does not have a date right now    3  MDD (major depressive disorder), recurrent, severe, with psychosis ([facility] )   continue Cymbalta    4  Generalized anxiety disorder   continue clonazepam  - CBC and differential; Future  - Comprehensive metabolic panel; Future  - TSH, 3rd generation; Future    5  Vitamin D deficiency   will check a vitamin-D level  - Vitamin D 25 hydroxy; Future    6  Fibromyalgia   continue Cymbalta and Topamax    7  Disc degeneration, lumbar   takes tramadol as needed which she has from her previous pain management doctor  She was advised to see pain management in the area  - Ambulatory referral to Pain Management; Future    8  Chronic idiopathic constipation   was advised to take MiraLax daily and increase the water and fiber intake in her diet    9  Cigarette nicotine dependence without complication   was counseled to quit smoking    10  Obesity (BMI 30-39  9)  BMI Counseling: Body mass index is 33 63 kg/m²  The BMI is above normal  Nutrition recommendations include decreasing portion sizes, encouraging healthy choices of fruits and vegetables and moderation in carbohydrate intake  Exercise recommendations include moderate physical activity 150 minutes/week  No pharmacotherapy was ordered         Tobacco Cessation Counseling: Tobacco cessation counseling was provided  The patient is sincerely urged to quit consumption of tobacco  She is not ready to quit tobacco        11  Encounter for screening mammogram for breast cancer    - Mammo screening bilateral w 3d & cad; Future    12  Encounter for screening for HIV    - HIV 1/2 Antigen/Antibody (4th Generation) w Reflex [facility]; Future    13  Screening for cardiovascular condition    - Lipid panel; Future      - Counseling Documentation: patient was counseled regarding: diagnostic results, instructions for management, risk factor reductions, prognosis, patient and family education, risks and benefits of treatment options and importance of compliance with treatment  - Medication Side Effects: Adverse side effects of medications were reviewed with the patient/guardian today  Return for follow up visit in  3 months or earlier, if needed  Chief Complaint:  Chief Complaint   Patient presents with    surgical clearance, hernia         History of Present Illness:    this is a new patient was here to establish  She has a surgery scheduled for a hernia and an abdominal/pelvic mass soon with Dr Carmelo Lane  Depression and anxiety- has been taking multiple medications prescribed by her psychiatrist and seems to be under control  Vitamin-D deficiency - was diagnosed in the past but is not taking supplement right now   Fibromyalgia- she has been taking Cymbalta with relief of symptoms  Disc degeneration-has symptoms in the back and neck and was getting tramadol from her pain management doctor in [place]    Chronic constipation -most likely due to the tramadol as she is on a high dose  Current smoker-continues to smoke half pack a day for more than 15 years   Obesity-was told to try to eat healthy      Active Problem List:  Patient Active Problem List   Diagnosis    Incisional hernia, without obstruction or gangrene    Fibromyalgia    Numbness    MDD (major depressive disorder), recurrent, severe, with psychosis ([facility] )    Somatoform disorder, unspecified    Disc degeneration, lumbar    Constipation    Arthritis    Generalized anxiety disorder    Vitamin D deficiency    Obesity (BMI 30-39  9)    Cigarette nicotine dependence without complication         Past Medical History:  Past Medical History:   Diagnosis Date    Bladder perforation, intraoperative     Depression     Intestine, perforation (HCC)     intra op    Sepsis ([facility] )          Past Surgical History:  Past Surgical History:   Procedure Laterality Date     SECTION      HYSTERECTOMY           Family History:  Family History   Problem Relation Age of Onset    Cancer Father          Social History:  Social History     Socioeconomic History    Marital status: /Civil Union     Spouse name: None    Number of children: None    Years of education: None    Highest education level: None   Occupational History    None   Social Needs    Financial resource strain: None    Food insecurity     Worry: None     Inability: None    Transportation needs     Medical: None     Non-medical: None   Tobacco Use    Smoking status: Current Every Day Smoker     Packs/day: 0 50    Smokeless tobacco: Never Used   Substance and Sexual Activity    Alcohol use: Not Currently    Drug use: Never    Sexual activity: None   Lifestyle    Physical activity     Days per week: None     Minutes per session: None    Stress: None   Relationships    Social connections     Talks on phone: None     Gets together: None     Attends Caodaism service: None     Active member of club or organization: None     Attends meetings of clubs or organizations: None     Relationship status: None    Intimate partner violence     Fear of current or ex partner: None     Emotionally abused: None     Physically abused: None     Forced sexual activity: None   Other Topics Concern    None   Social History Narrative    None         Allergies:  No Known Allergies      Medications:    Current Outpatient Medications:     clonazePAM (KlonoPIN) 1 mg tablet, take 0 5 tablet by mouth UP TO THREE TIMES A DAY if needed for anxiety, Disp: , Rfl:     DULoxetine (CYMBALTA) 60 mg delayed release capsule, Take 60 mg by mouth daily, Disp: , Rfl:     topiramate (TOPAMAX) 25 mg tablet, Take 25 mg by mouth daily, Disp: , Rfl:     topiramate (TOPAMAX) 50 MG tablet, Take 50 mg by mouth daily, Disp: , Rfl:     traMADol (ULTRAM) 50 mg tablet, Take 100 mg by mouth every 8 (eight) hours as needed for moderate pain, Disp: , Rfl:     traZODone (DESYREL) 100 mg tablet, take 1 TO 2 tablets by mouth AT NIGHT if needed for insomnia, Disp: , Rfl:       The following portions of the patient's history were reviewed and updated as appropriate: past medical history, past surgical history, family history, social history, allergies, current medications and active problem list       Review of Systems:  Constitutional: Denies fever, chills, weight gain, weight loss, fatigue  Eyes: Denies eye redness, eye discharge, double vision, change in visual acuity  ENT: Denies hearing loss, tinnitus, sneezing, nasal congestion, nasal discharge, sore throat   Respiratory: Denies cough, expectoration, hemoptysis, shortness of breath, wheezing  Cardiovascular: Denies chest pain, palpitations, lower extremity swelling, orthopnea, PND  Gastrointestinal: Denies abdominal pain, heartburn, nausea, vomiting, hematemesis, diarrhea, bloody stools  Genito-Urinary: Denies dysuria, frequency, difficulty in micturition, nocturia, incontinence  Musculoskeletal:  Complains of back pain, joint pain, muscle pain  Neurologic: Denies confusion, lightheadedness, syncope, headache, focal weakness, sensory changes, seizures  Endocrine: Denies polyuria, polydipsia, temperature intolerance  Allergy and Immunology: Denies hives, insect bite sensitivity  Hematological and Lymphatic: Denies bleeding problems, swollen glands Psychological: Denies depression, suicidal ideation, anxiety, panic, mood swings  Dermatological: Denies pruritus, rash, skin lesion changes      Vitals:  Vitals:    02/17/21 1651   BP: 98/64   Pulse: 70   Temp: 97 9 °F (36 6 °C)       Body mass index is 33 63 kg/m²  Weight (last 2 days)     Date/Time   Weight    02/17/21 1651   80 7 (178)                Physical Examination:  General: Patient is not in acute distress  Awake, alert, responding to commands  No weight gain or loss  Head: Normocephalic  Atraumatic  Eyes: Conjunctiva and lids with no swelling, erythema or discharge  Both pupils normal sized, round and reactive to light  Sclera nonicteric  ENT: External examination of nose and ear normal  Otoscopic examination shows translucent tympanic membranes with patent canals without erythema  Oropharynx moist with no erythema, edema, exudate or lesions  Neck: Supple  JVP not raised  Trachea midline  No masses  No thyromegaly  Lungs: No signs of increased work of breathing or respiratory distress  Bilateral bronchovascular breath sounds with no crackles or rhonchi  Chest wall: No tenderness  Cardiovascular: Normal PMI  No thrills  Regular rate and rhythm  S1 and S2 normal  No murmur, rub or gallop  Gastrointestinal: Abdomen soft, nontender  No guarding or rigidity  Liver and spleen not palpable  Bowel sounds present  Neurologic: Cranial nerves II-XII intact   Cortical functions normal  Motor system - Reflexes 2+ and symmetrical  Sensations normal  Musculoskeletal: Gait normal  No joint tenderness  Integumentary: Skin normal with no rash or lesions  Lymphatic: No palpable lymph nodes in neck, axilla or groin  Extremities: No clubbing, cyanosis, edema or varicosities  Psychological: Judgement and insight normal  Mood and affect normal      Laboratory Results:  CBC with diff:   Lab Results   Component Value Date    WBC 7 77 01/05/2021    RBC 4 51 01/05/2021    HGB 12 9 01/05/2021    HCT 40 7 01/05/2021    MCV 90 01/05/2021    MCH 28 6 01/05/2021    RDW 13 4 01/05/2021     01/05/2021       CMP:  Lab Results   Component Value Date    CREATININE 1 16 01/05/2021    BUN 13 01/05/2021    K 3 9 01/05/2021     01/05/2021    CO2 26 01/05/2021    ALKPHOS 80 01/05/2021    ALT 16 01/05/2021    AST 10 01/05/2021       Lab Results   Component Value Date    MG 1 9 01/05/2021       No results found for: TROPONINI, CKMB, CKTOTAL    Lipid Profile:   No results found for: CHOL  No results found for: HDL  No results found for: LDLCALC  No results found for: TRIG    Imaging Results:  Colonoscopy  Narrative:  [facility]  [facility]  [phone]    DATE OF SERVICE:  2/04/21    PHYSICIAN(S):  Edyta Babcock DO - Attending Physician    INDICATION:  Constipation by delayed colonic transit  Colonoscopy performed for a diagnostic indication  POST-OP DIAGNOSIS:  See the impression below  HISTORY:  Prior colonoscopy: 5 years ago  PREPROCEDURE:  Informed consent was obtained for the procedure, including sedation  Risks   including but not limited to bleeding, infection, perforation, adverse   drug reaction and aspiration were explained in detail  Also explained   about less than 100% sensitivity with the exam and other alternatives  The   patient was placed in the left lateral decubitus position  DETAILS OF PROCEDURE:  Patient was taken to the procedure room where a time out was performed to   confirm correct patient and correct procedure  The patient underwent   monitored anesthesia care, which was administered by an anesthesia   professional  The patient's blood pressure, heart rate, level of   consciousness, oxygen and respirations were monitored throughout the   procedure  A digital rectal exam was performed  The scope was introduced   through the anus and advanced to the cecum   Photodocumentation was   obtained at the ileocecal valve, appendiceal orifice and retroflexed view   of the rectum  Retroflexion was performed in the rectum  The quality of   bowel preparation was evaluated using the [facility] Bowel Preparation Scale   with scores of: right colon = 2, transverse colon = 2, left colon = 2  The   total BBPS score was 6  Bowel prep was adequate  The patient's estimated   blood loss was minimal (<5 mL)  The procedure was not difficult  The   patient tolerated the procedure well  There were no apparent   complications  ANESTHESIA INFORMATION:  ASA: II  Anesthesia Type: IV Sedation with Anesthesia    FINDINGS:  One sessile polyp measuring smaller than 5 mm in the cecum; completely   removed by cold snare and retrieved specimen  One sessile polyp measuring smaller than 5 mm in the transverse colon;   completely removed en bloc by cold snare and retrieved specimen  All observed locations appeared normal, including the ascending colon,   hepatic flexure, splenic flexure, descending colon, sigmoid colon,   rectosigmoid and rectum  EVENTS:  Procedure Events   Event Event Time   ENDO CECUM REACHED 2/4/2021 10:36 AM   ENDO SCOPE OUT TIME 2/4/2021 10:44 AM     SPECIMENS:  ID Type Source Tests Collected by Time Destination   1 : cecum Tissue Polyp, Colorectal TISSUE EXAM Moises Laws DO 2/4/2021   10:42 AM    2 : transverse Tissue Polyp, Colorectal TISSUE EXAM Moises Laws DO   2/4/2021 10:43 AM         Impression: 1  Cecal polyp status post snare polypectomy and retrieval  2   Polyp of the transverse colon status post snare polypectomy and   retrieval     RECOMMENDATION:  Repeat in 5 years due to a personal history of colon polyps    Will call the patient 1 week regarding the polyp results    Moises Laws DO, Domenica Lucho [place], [address] Maintenance:  Health Maintenance   Topic Date Due    MAMMOGRAM  1975    HIV Screening  12/21/1988    BMI: Followup Plan  12/21/1991    Annual Physical  12/21/1991    DTaP,Tdap,and Td Vaccines (1 - Tdap) 12/21/1994    Cervical Cancer Screening  12/21/1994    BMI: Adult  02/17/2022    Depression Remission PHQ  02/17/2022    Colonoscopy Surveillance  02/04/2026    Pneumococcal Vaccine: Pediatrics (0 to 5 Years) and At-Risk Patients (6 to 59 Years)  Completed    Influenza Vaccine  Completed    HIB Vaccine  Aged Out    Hepatitis B Vaccine  Aged Out    IPV Vaccine  Aged Out    Hepatitis A Vaccine  Aged Out    Meningococcal ACWY Vaccine  Aged Out    HPV Vaccine  Aged Out     Immunization History   Administered Date(s) Administered    INFLUENZA 09/11/2020    Influenza Quadrivalent Preservative Free 3 years and older IM 09/11/2020    Pneumococcal Polysaccharide PPV23 08/27/2019    TD (adult) Preservative Free 08/27/2019    Td (adult), adsorbed 08/27/2019    Tuberculin Skin Test-PPD Intradermal 02/24/2020         Fuentes Umaña MD  2/17/2021,6:19 PM

## 2021-02-24 ENCOUNTER — OFFICE VISIT (OUTPATIENT)
Dept: SURGERY | Facility: CLINIC | Age: 46
End: 2021-02-24
Payer: COMMERCIAL

## 2021-02-24 VITALS
HEART RATE: 100 BPM | DIASTOLIC BLOOD PRESSURE: 62 MMHG | SYSTOLIC BLOOD PRESSURE: 106 MMHG | BODY MASS INDEX: 33.38 KG/M2 | TEMPERATURE: 97.7 F | WEIGHT: 176.8 LBS | HEIGHT: 61 IN

## 2021-02-24 DIAGNOSIS — K43.0 IRREDUCIBLE VENTRAL INCISIONAL HERNIA: ICD-10-CM

## 2021-02-24 DIAGNOSIS — Z01.818 PRE-OPERATIVE CLEARANCE: Primary | ICD-10-CM

## 2021-02-24 PROCEDURE — 99214 OFFICE O/P EST MOD 30 MIN: CPT | Performed by: SURGERY

## 2021-02-24 NOTE — PROGRESS NOTES
Assessment/Plan:     1  Pre-operative clearance  -     PAT Covid Screening; Future    2  Irreducible ventral incisional hernia  -     Case request operating room: [facility] [address]; Standing  -     Type and screen; Future  -     Case request operating room: REPAIR HERNIA INCISIONAL  -     PAT Covid Screening; Future      We reviewed her CT scan  Colonoscopy with two polyps, otherwise normal  There also is what appears to be a fatty mass just below the rectus in the pelvis (which is likely just inferior to the hernia defect) which we will excise and send for pathology  We discussed infraumbilical incision as the approach with estimated 2 hour procedure  She can anticipate at least a 2-3 day hospital stay for recovery  Risks not limited to bleeding, infection, damage to surrounding structures, reactions to medications  Subjective: ventral hernia     Patient ID: Shaquille Kline is a 50 y o  female  Triage Notes:    Ms Jax Gaston is following up after her CT scan to evaluate the size and extent of her hernia for preop planning  She has a low mildine hernia after multiple pfannensteil incisions  She does have some discomfort worsened by activity, standing, lifting  She reports being otherwise in her usual state of health  She had her colonoscopy and is better managing her constipation         The following portions of the patient's history were reviewed and updated as appropriate: allergies, current medications, past family history, past medical history, past social history, past surgical history and problem list     Review of Systems      Objective:      /62   Pulse 100   Temp 97 7 °F (36 5 °C) (Temporal)   Ht 5' 1" (1 549 m)   Wt 80 2 kg (176 lb 12 8 oz)   Breastfeeding No   BMI 33 41 kg/m²     Below is the patient's most recent value for Albumin, ALT, AST, BUN, Calcium, Chloride, Cholesterol, CO2, Creatinine, GFR, Glucose, HDL, Hematocrit, Hemoglobin, Hemoglobin A1C, LDL, Magnesium, Phosphorus, Platelets, Potassium, PSA, Sodium, Triglycerides, and WBC  Lab Results   Component Value Date    ALT 16 02/25/2021    AST 17 02/25/2021    BUN 13 02/25/2021    CALCIUM 9 4 02/25/2021     (H) 02/25/2021    CO2 27 02/25/2021    CREATININE 0 99 02/25/2021    HDL 54 02/25/2021    HCT 40 5 02/25/2021    HGB 12 9 02/25/2021    MG 1 9 01/05/2021     02/25/2021    K 4 2 02/25/2021    TRIG 135 02/25/2021    WBC 8 19 02/25/2021     Note: for a comprehensive list of the patient's lab results, access the Results Review activity  Physical Exam  Vitals signs and nursing note reviewed  Constitutional:       General: She is not in acute distress  Appearance: She is well-developed  She is not diaphoretic  HENT:      Head: Normocephalic and atraumatic  Eyes:      Pupils: Pupils are equal, round, and reactive to light  Neck:      Musculoskeletal: Normal range of motion and neck supple  Cardiovascular:      Rate and Rhythm: Normal rate and regular rhythm  Pulmonary:      Effort: Pulmonary effort is normal       Breath sounds: Normal breath sounds  Abdominal:      Palpations: Abdomen is soft  Hernia: A hernia (about [facility] between the umbilicus and pubis) is present  Musculoskeletal: Normal range of motion  Skin:     General: Skin is warm and dry  Neurological:      Mental Status: She is alert and oriented to person, place, and time               Procedures

## 2021-02-24 NOTE — H&P (VIEW-ONLY)
Assessment/Plan:     1  Pre-operative clearance  -     PAT Covid Screening; Future    2  Irreducible ventral incisional hernia  -     Case request operating room: [facility] [address]; Standing  -     Type and screen; Future  -     Case request operating room: REPAIR HERNIA INCISIONAL  -     PAT Covid Screening; Future      We reviewed her CT scan  Colonoscopy with two polyps, otherwise normal  There also is what appears to be a fatty mass just below the rectus in the pelvis (which is likely just inferior to the hernia defect) which we will excise and send for pathology  We discussed infraumbilical incision as the approach with estimated 2 hour procedure  She can anticipate at least a 2-3 day hospital stay for recovery  Risks not limited to bleeding, infection, damage to surrounding structures, reactions to medications  Subjective: ventral hernia     Patient ID: Cass Pelaez is a 50 y o  female  Triage Notes:    Ms Tiara Winchester is following up after her CT scan to evaluate the size and extent of her hernia for preop planning  She has a low mildine hernia after multiple pfannensteil incisions  She does have some discomfort worsened by activity, standing, lifting  She reports being otherwise in her usual state of health  She had her colonoscopy and is better managing her constipation         The following portions of the patient's history were reviewed and updated as appropriate: allergies, current medications, past family history, past medical history, past social history, past surgical history and problem list     Review of Systems      Objective:      /62   Pulse 100   Temp 97 7 °F (36 5 °C) (Temporal)   Ht 5' 1" (1 549 m)   Wt 80 2 kg (176 lb 12 8 oz)   Breastfeeding No   BMI 33 41 kg/m²     Below is the patient's most recent value for Albumin, ALT, AST, BUN, Calcium, Chloride, Cholesterol, CO2, Creatinine, GFR, Glucose, HDL, Hematocrit, Hemoglobin, Hemoglobin A1C, LDL, Magnesium, Phosphorus, Platelets, Potassium, PSA, Sodium, Triglycerides, and WBC  Lab Results   Component Value Date    ALT 16 02/25/2021    AST 17 02/25/2021    BUN 13 02/25/2021    CALCIUM 9 4 02/25/2021     (H) 02/25/2021    CO2 27 02/25/2021    CREATININE 0 99 02/25/2021    HDL 54 02/25/2021    HCT 40 5 02/25/2021    HGB 12 9 02/25/2021    MG 1 9 01/05/2021     02/25/2021    K 4 2 02/25/2021    TRIG 135 02/25/2021    WBC 8 19 02/25/2021     Note: for a comprehensive list of the patient's lab results, access the Results Review activity  Physical Exam  Vitals signs and nursing note reviewed  Constitutional:       General: She is not in acute distress  Appearance: She is well-developed  She is not diaphoretic  HENT:      Head: Normocephalic and atraumatic  Eyes:      Pupils: Pupils are equal, round, and reactive to light  Neck:      Musculoskeletal: Normal range of motion and neck supple  Cardiovascular:      Rate and Rhythm: Normal rate and regular rhythm  Pulmonary:      Effort: Pulmonary effort is normal       Breath sounds: Normal breath sounds  Abdominal:      Palpations: Abdomen is soft  Hernia: A hernia (about snf between the umbilicus and pubis) is present  Musculoskeletal: Normal range of motion  Skin:     General: Skin is warm and dry  Neurological:      Mental Status: She is alert and oriented to person, place, and time               Procedures

## 2021-02-25 ENCOUNTER — APPOINTMENT (OUTPATIENT)
Dept: LAB | Facility: CLINIC | Age: 46
End: 2021-02-25
Payer: COMMERCIAL

## 2021-02-25 ENCOUNTER — TRANSCRIBE ORDERS (OUTPATIENT)
Dept: LAB | Facility: CLINIC | Age: 46
End: 2021-02-25

## 2021-02-25 DIAGNOSIS — K43.0 IRREDUCIBLE VENTRAL INCISIONAL HERNIA: ICD-10-CM

## 2021-02-25 DIAGNOSIS — Z11.4 ENCOUNTER FOR SCREENING FOR HIV: ICD-10-CM

## 2021-02-25 DIAGNOSIS — F41.1 GENERALIZED ANXIETY DISORDER: ICD-10-CM

## 2021-02-25 DIAGNOSIS — Z13.6 SCREENING FOR CARDIOVASCULAR CONDITION: ICD-10-CM

## 2021-02-25 DIAGNOSIS — E55.9 VITAMIN D DEFICIENCY: ICD-10-CM

## 2021-02-25 DIAGNOSIS — K43.0: Primary | ICD-10-CM

## 2021-02-25 LAB
25(OH)D3 SERPL-MCNC: 17.6 NG/ML (ref 30–100)
ALBUMIN SERPL BCP-MCNC: 3.9 G/DL (ref 3.5–5)
ALP SERPL-CCNC: 85 U/L (ref 46–116)
ALT SERPL W P-5'-P-CCNC: 16 U/L (ref 12–78)
ANION GAP SERPL CALCULATED.3IONS-SCNC: 4 MMOL/L (ref 4–13)
AST SERPL W P-5'-P-CCNC: 17 U/L (ref 5–45)
BASOPHILS # BLD AUTO: 0.07 THOUSANDS/ΜL (ref 0–0.1)
BASOPHILS NFR BLD AUTO: 1 % (ref 0–1)
BILIRUB SERPL-MCNC: 0.29 MG/DL (ref 0.2–1)
BUN SERPL-MCNC: 13 MG/DL (ref 5–25)
CALCIUM SERPL-MCNC: 9.4 MG/DL (ref 8.3–10.1)
CHLORIDE SERPL-SCNC: 110 MMOL/L (ref 100–108)
CHOLEST SERPL-MCNC: 220 MG/DL (ref 50–200)
CO2 SERPL-SCNC: 27 MMOL/L (ref 21–32)
CREAT SERPL-MCNC: 0.99 MG/DL (ref 0.6–1.3)
EOSINOPHIL # BLD AUTO: 0.16 THOUSAND/ΜL (ref 0–0.61)
EOSINOPHIL NFR BLD AUTO: 2 % (ref 0–6)
ERYTHROCYTE [DISTWIDTH] IN BLOOD BY AUTOMATED COUNT: 14.3 % (ref 11.6–15.1)
GFR SERPL CREATININE-BSD FRML MDRD: 68 ML/MIN/1.73SQ M
GLUCOSE SERPL-MCNC: 87 MG/DL (ref 65–140)
HCT VFR BLD AUTO: 40.5 % (ref 34.8–46.1)
HDLC SERPL-MCNC: 54 MG/DL
HGB BLD-MCNC: 12.9 G/DL (ref 11.5–15.4)
IMM GRANULOCYTES # BLD AUTO: 0.03 THOUSAND/UL (ref 0–0.2)
IMM GRANULOCYTES NFR BLD AUTO: 0 % (ref 0–2)
LDLC SERPL CALC-MCNC: 139 MG/DL (ref 0–100)
LYMPHOCYTES # BLD AUTO: 2.73 THOUSANDS/ΜL (ref 0.6–4.47)
LYMPHOCYTES NFR BLD AUTO: 33 % (ref 14–44)
MCH RBC QN AUTO: 29.1 PG (ref 26.8–34.3)
MCHC RBC AUTO-ENTMCNC: 31.9 G/DL (ref 31.4–37.4)
MCV RBC AUTO: 91 FL (ref 82–98)
MONOCYTES # BLD AUTO: 0.55 THOUSAND/ΜL (ref 0.17–1.22)
MONOCYTES NFR BLD AUTO: 7 % (ref 4–12)
NEUTROPHILS # BLD AUTO: 4.65 THOUSANDS/ΜL (ref 1.85–7.62)
NEUTS SEG NFR BLD AUTO: 57 % (ref 43–75)
NONHDLC SERPL-MCNC: 166 MG/DL
NRBC BLD AUTO-RTO: 0 /100 WBCS
PLATELET # BLD AUTO: 297 THOUSANDS/UL (ref 149–390)
PMV BLD AUTO: 10.7 FL (ref 8.9–12.7)
POTASSIUM SERPL-SCNC: 4.2 MMOL/L (ref 3.5–5.3)
PROT SERPL-MCNC: 7.6 G/DL (ref 6.4–8.2)
RBC # BLD AUTO: 4.44 MILLION/UL (ref 3.81–5.12)
SODIUM SERPL-SCNC: 141 MMOL/L (ref 136–145)
TRIGL SERPL-MCNC: 135 MG/DL
TSH SERPL DL<=0.05 MIU/L-ACNC: 1.27 UIU/ML (ref 0.36–3.74)
WBC # BLD AUTO: 8.19 THOUSAND/UL (ref 4.31–10.16)

## 2021-02-25 PROCEDURE — 84443 ASSAY THYROID STIM HORMONE: CPT

## 2021-02-25 PROCEDURE — 80053 COMPREHEN METABOLIC PANEL: CPT

## 2021-02-25 PROCEDURE — 86900 BLOOD TYPING SEROLOGIC ABO: CPT

## 2021-02-25 PROCEDURE — 80061 LIPID PANEL: CPT

## 2021-02-25 PROCEDURE — 85025 COMPLETE CBC W/AUTO DIFF WBC: CPT

## 2021-02-25 PROCEDURE — 86901 BLOOD TYPING SEROLOGIC RH(D): CPT

## 2021-02-25 PROCEDURE — 82306 VITAMIN D 25 HYDROXY: CPT

## 2021-02-25 PROCEDURE — 87389 HIV-1 AG W/HIV-1&-2 AB AG IA: CPT

## 2021-02-25 PROCEDURE — 86850 RBC ANTIBODY SCREEN: CPT

## 2021-02-25 PROCEDURE — 36415 COLL VENOUS BLD VENIPUNCTURE: CPT

## 2021-02-26 ENCOUNTER — TELEPHONE (OUTPATIENT)
Dept: INTERNAL MEDICINE CLINIC | Facility: CLINIC | Age: 46
End: 2021-02-26

## 2021-02-26 LAB
ABO GROUP BLD: NORMAL
BLD GP AB SCN SERPL QL: NEGATIVE
HIV 1+2 AB+HIV1 P24 AG SERPL QL IA: NORMAL
RH BLD: POSITIVE
SPECIMEN EXPIRATION DATE: NORMAL

## 2021-02-26 NOTE — TELEPHONE ENCOUNTER
----- Message from Kathie Mosquera MD sent at 2/26/2021  8:25 AM EST -----  Please take vitamin-D 5000 units daily over-the-counter

## 2021-03-12 ENCOUNTER — APPOINTMENT (OUTPATIENT)
Dept: PREADMISSION TESTING | Facility: HOSPITAL | Age: 46
End: 2021-03-12
Payer: COMMERCIAL

## 2021-03-12 DIAGNOSIS — Z01.818 PRE-OPERATIVE CLEARANCE: ICD-10-CM

## 2021-03-12 DIAGNOSIS — K43.0 IRREDUCIBLE VENTRAL INCISIONAL HERNIA: ICD-10-CM

## 2021-03-12 PROCEDURE — U0005 INFEC AGEN DETEC AMPLI PROBE: HCPCS | Performed by: SURGERY

## 2021-03-12 PROCEDURE — U0003 INFECTIOUS AGENT DETECTION BY NUCLEIC ACID (DNA OR RNA); SEVERE ACUTE RESPIRATORY SYNDROME CORONAVIRUS 2 (SARS-COV-2) (CORONAVIRUS DISEASE [COVID-19]), AMPLIFIED PROBE TECHNIQUE, MAKING USE OF HIGH THROUGHPUT TECHNOLOGIES AS DESCRIBED BY CMS-2020-01-R: HCPCS | Performed by: SURGERY

## 2021-03-14 LAB — SARS-COV-2 RNA RESP QL NAA+PROBE: NEGATIVE

## 2021-03-16 NOTE — PRE-PROCEDURE INSTRUCTIONS
Pre-Surgery Instructions:   Medication Instructions    clonazePAM (KlonoPIN) 1 mg tablet Take as directed    DULoxetine (CYMBALTA) 60 mg delayed release capsule Take as directed    topiramate (TOPAMAX) 25 mg tablet Take as directed    topiramate (TOPAMAX) 50 MG tablet Take as directed    traMADol (ULTRAM) 50 mg tablet PRN      My Surgical Experience    The following information was developed to assist you to prepare for your operation  What do I need to do before coming to the hospital?   Arrange for a responsible person to drive you to and from the hospital    Arrange care for your children at home  Children are not allowed in the recovery areas of the hospital   Plan to wear clothing that is easy to put on and take off  If you are having shoulder surgery, wear a shirt that buttons or zippers in the front  Bathing  o Shower the evening before and the morning of your surgery with an antibacterial soap  Please refer to the Pre Op Showering Instructions for Surgery Patients Sheet   o Remove nail polish and all body piercing jewelry  o Do not shave any body part for at least 24 hours before surgery-this includes face, arms, legs and upper body  Food  o Nothing to eat or drink after midnight the night before your surgery  This includes candy and chewing gum  o Exception: If your surgery is after 12:00pm (noon), you may have clear liquids such as 7-Up®, ginger ale, apple or cranberry juice, Jell-O®, water, or clear broth until 8:00 am  o Do not drink milk or juice with pulp on the morning before surgery  o Do not drink alcohol 24 hours before surgery  Medicine  o Follow instructions you received from your surgeon about which medicines you may take on the day of surgery  o If instructed to take medicine on the morning of surgery, take pills with just a small sip of water   Call your prescribing doctor for specific infroamtion on what to do if you take insulin    What should I bring to the hospital?    Bring:  [place] Conception or a walker, if you have them, for foot or knee surgery   A list of the daily medicines, vitamins, minerals, herbals and nutritional supplements you take  Include the dosages of medicines and the time you take them each day   Glasses, dentures or hearing aids   Minimal clothing; you will be wearing hospital sleepwear   Photo ID; required to verify your identity   If you have a Living Will or Power of , bring a copy of the documents   If you have an ostomy, bring an extra pouch and any supplies you use    Do not bring   Medicines or inhalers   Money, valuables or jewelry    What other information should I know about the day of surgery?  Notify your surgeons if you develop a cold, sore throat, cough, fever, rash or any other illness   Report to the Ambulatory Surgical/Same Day Surgery Unit   You will be instructed to stop at Registration only if you have not been pre-registered   Inform your  fi they do not stay that they will be asked by the staff to leave a phone number where they can be reached   Be available to be reached before surgery  In the event the operating room schedule changes, you may be asked to come in earlier or later than expected    *It is important to tell your doctor and others involved in your health care if you are taking or have been taking any non-prescription drugs, vitamins, minerals, herbals or other nutritional supplements   Any of these may interact with some food or medicines and cause a reaction

## 2021-03-17 ENCOUNTER — TELEPHONE (OUTPATIENT)
Dept: SURGERY | Facility: CLINIC | Age: 46
End: 2021-03-17

## 2021-03-17 NOTE — TELEPHONE ENCOUNTER
I need to verify if pt has a secondary insurance  I have left several messages for pt even one with her  to call me back asap  I do not want to cx or r/s her procedure  I did state that if she does not provide the necessary information that i would have to due to missing information  And proper authorization can not be provided

## 2021-03-18 ENCOUNTER — TELEPHONE (OUTPATIENT)
Dept: SURGERY | Facility: CLINIC | Age: 46
End: 2021-03-18

## 2021-03-18 NOTE — TELEPHONE ENCOUNTER
Pt was notified that her medicare ID number is required for her scheduled procedure  If ID numbers are not provided her case will have to be rescheduled

## 2021-03-21 ENCOUNTER — ANESTHESIA EVENT (OUTPATIENT)
Dept: PERIOP | Facility: HOSPITAL | Age: 46
DRG: 354 | End: 2021-03-21
Payer: MEDICARE

## 2021-03-22 ENCOUNTER — HOSPITAL ENCOUNTER (INPATIENT)
Facility: HOSPITAL | Age: 46
LOS: 2 days | Discharge: HOME | DRG: 354 | End: 2021-03-24
Attending: SURGERY | Admitting: SURGERY
Payer: MEDICARE

## 2021-03-22 ENCOUNTER — ANESTHESIA (OUTPATIENT)
Dept: PERIOP | Facility: HOSPITAL | Age: 46
DRG: 354 | End: 2021-03-22
Payer: MEDICARE

## 2021-03-22 DIAGNOSIS — F33.3 MDD (MAJOR DEPRESSIVE DISORDER), RECURRENT, SEVERE, WITH PSYCHOSIS (HCC): ICD-10-CM

## 2021-03-22 DIAGNOSIS — K43.0 IRREDUCIBLE VENTRAL INCISIONAL HERNIA: ICD-10-CM

## 2021-03-22 DIAGNOSIS — F45.9 SOMATOFORM DISORDER, UNSPECIFIED: ICD-10-CM

## 2021-03-22 DIAGNOSIS — T65.291A TOXIC EFFECT OF TOBACCO AND NICOTINE, ACCIDENTAL OR UNINTENTIONAL, INITIAL ENCOUNTER: Primary | ICD-10-CM

## 2021-03-22 DIAGNOSIS — M79.7 FIBROMYALGIA: ICD-10-CM

## 2021-03-22 DIAGNOSIS — F41.1 GENERALIZED ANXIETY DISORDER: ICD-10-CM

## 2021-03-22 PROCEDURE — 88305 TISSUE EXAM BY PATHOLOGIST: CPT | Performed by: PATHOLOGY

## 2021-03-22 PROCEDURE — 49560 PR REPAIR INCISIONAL HERNIA,REDUCIBLE: CPT | Performed by: SURGERY

## 2021-03-22 PROCEDURE — 87205 SMEAR GRAM STAIN: CPT | Performed by: SURGERY

## 2021-03-22 PROCEDURE — 88331 PATH CONSLTJ SURG 1 BLK 1SPC: CPT | Performed by: PATHOLOGY

## 2021-03-22 PROCEDURE — C1781 MESH (IMPLANTABLE): HCPCS | Performed by: SURGERY

## 2021-03-22 PROCEDURE — 88302 TISSUE EXAM BY PATHOLOGIST: CPT | Performed by: PATHOLOGY

## 2021-03-22 PROCEDURE — 87075 CULTR BACTERIA EXCEPT BLOOD: CPT | Performed by: SURGERY

## 2021-03-22 PROCEDURE — 0WUF0JZ SUPPLEMENT ABDOMINAL WALL WITH SYNTHETIC SUBSTITUTE, OPEN APPROACH: ICD-10-PCS | Performed by: SURGERY

## 2021-03-22 PROCEDURE — C1765 ADHESION BARRIER: HCPCS | Performed by: SURGERY

## 2021-03-22 PROCEDURE — 94664 DEMO&/EVAL PT USE INHALER: CPT

## 2021-03-22 PROCEDURE — 87070 CULTURE OTHR SPECIMN AEROBIC: CPT | Performed by: SURGERY

## 2021-03-22 PROCEDURE — 49560 PR REPAIR INCISIONAL HERNIA,REDUCIBLE: CPT | Performed by: PHYSICIAN ASSISTANT

## 2021-03-22 PROCEDURE — 49568 PR IMPLANT MESH HERNIA REPAIR/DEBRIDEMENT CLOSURE: CPT | Performed by: PHYSICIAN ASSISTANT

## 2021-03-22 PROCEDURE — 49568 PR IMPLANT MESH HERNIA REPAIR/DEBRIDEMENT CLOSURE: CPT | Performed by: SURGERY

## 2021-03-22 DEVICE — BARD SOFT MESH
Type: IMPLANTABLE DEVICE | Site: ABDOMEN | Status: FUNCTIONAL
Brand: BARD SOFT MESH

## 2021-03-22 RX ORDER — ROCURONIUM BROMIDE 10 MG/ML
INJECTION, SOLUTION INTRAVENOUS AS NEEDED
Status: DISCONTINUED | OUTPATIENT
Start: 2021-03-22 | End: 2021-03-22

## 2021-03-22 RX ORDER — FENTANYL CITRATE 50 UG/ML
INJECTION, SOLUTION INTRAMUSCULAR; INTRAVENOUS AS NEEDED
Status: DISCONTINUED | OUTPATIENT
Start: 2021-03-22 | End: 2021-03-22

## 2021-03-22 RX ORDER — SODIUM CHLORIDE, SODIUM LACTATE, POTASSIUM CHLORIDE, CALCIUM CHLORIDE 600; 310; 30; 20 MG/100ML; MG/100ML; MG/100ML; MG/100ML
125 INJECTION, SOLUTION INTRAVENOUS CONTINUOUS
Status: DISCONTINUED | OUTPATIENT
Start: 2021-03-22 | End: 2021-03-23

## 2021-03-22 RX ORDER — CLONAZEPAM 0.5 MG/1
0.5 TABLET ORAL 3 TIMES DAILY
Status: DISCONTINUED | OUTPATIENT
Start: 2021-03-22 | End: 2021-03-24 | Stop reason: HOSPADM

## 2021-03-22 RX ORDER — ONDANSETRON 2 MG/ML
4 INJECTION INTRAMUSCULAR; INTRAVENOUS EVERY 6 HOURS PRN
Status: DISCONTINUED | OUTPATIENT
Start: 2021-03-22 | End: 2021-03-24 | Stop reason: HOSPADM

## 2021-03-22 RX ORDER — DOCUSATE SODIUM 100 MG/1
100 CAPSULE, LIQUID FILLED ORAL 2 TIMES DAILY
Status: DISCONTINUED | OUTPATIENT
Start: 2021-03-22 | End: 2021-03-24 | Stop reason: HOSPADM

## 2021-03-22 RX ORDER — DEXMEDETOMIDINE HYDROCHLORIDE 100 UG/ML
INJECTION, SOLUTION INTRAVENOUS AS NEEDED
Status: DISCONTINUED | OUTPATIENT
Start: 2021-03-22 | End: 2021-03-22

## 2021-03-22 RX ORDER — MIDAZOLAM HYDROCHLORIDE 2 MG/2ML
INJECTION, SOLUTION INTRAMUSCULAR; INTRAVENOUS AS NEEDED
Status: DISCONTINUED | OUTPATIENT
Start: 2021-03-22 | End: 2021-03-22

## 2021-03-22 RX ORDER — MAGNESIUM HYDROXIDE 1200 MG/15ML
LIQUID ORAL AS NEEDED
Status: DISCONTINUED | OUTPATIENT
Start: 2021-03-22 | End: 2021-03-22 | Stop reason: HOSPADM

## 2021-03-22 RX ORDER — ONDANSETRON 2 MG/ML
INJECTION INTRAMUSCULAR; INTRAVENOUS AS NEEDED
Status: DISCONTINUED | OUTPATIENT
Start: 2021-03-22 | End: 2021-03-22

## 2021-03-22 RX ORDER — TOPIRAMATE 25 MG/1
25 TABLET ORAL DAILY
Status: DISCONTINUED | OUTPATIENT
Start: 2021-03-22 | End: 2021-03-24 | Stop reason: HOSPADM

## 2021-03-22 RX ORDER — GLYCOPYRROLATE 0.2 MG/ML
INJECTION INTRAMUSCULAR; INTRAVENOUS AS NEEDED
Status: DISCONTINUED | OUTPATIENT
Start: 2021-03-22 | End: 2021-03-22

## 2021-03-22 RX ORDER — EPHEDRINE SULFATE 50 MG/ML
INJECTION INTRAVENOUS AS NEEDED
Status: DISCONTINUED | OUTPATIENT
Start: 2021-03-22 | End: 2021-03-22

## 2021-03-22 RX ORDER — CEFAZOLIN SODIUM 2 G/50ML
2000 SOLUTION INTRAVENOUS ONCE
Status: COMPLETED | OUTPATIENT
Start: 2021-03-22 | End: 2021-03-23

## 2021-03-22 RX ORDER — TRAZODONE HYDROCHLORIDE 100 MG/1
100 TABLET ORAL
Status: DISCONTINUED | OUTPATIENT
Start: 2021-03-22 | End: 2021-03-24 | Stop reason: HOSPADM

## 2021-03-22 RX ORDER — HYDROMORPHONE HCL 110MG/55ML
PATIENT CONTROLLED ANALGESIA SYRINGE INTRAVENOUS AS NEEDED
Status: DISCONTINUED | OUTPATIENT
Start: 2021-03-22 | End: 2021-03-22

## 2021-03-22 RX ORDER — TOPIRAMATE 25 MG/1
50 TABLET ORAL DAILY
Status: DISCONTINUED | OUTPATIENT
Start: 2021-03-22 | End: 2021-03-24 | Stop reason: HOSPADM

## 2021-03-22 RX ORDER — DEXAMETHASONE SODIUM PHOSPHATE 10 MG/ML
INJECTION, SOLUTION INTRAMUSCULAR; INTRAVENOUS AS NEEDED
Status: DISCONTINUED | OUTPATIENT
Start: 2021-03-22 | End: 2021-03-22

## 2021-03-22 RX ORDER — HYDROMORPHONE HCL/PF 1 MG/ML
0.4 SYRINGE (ML) INJECTION
Status: DISCONTINUED | OUTPATIENT
Start: 2021-03-22 | End: 2021-03-22 | Stop reason: HOSPADM

## 2021-03-22 RX ORDER — DULOXETIN HYDROCHLORIDE 60 MG/1
60 CAPSULE, DELAYED RELEASE ORAL DAILY
Status: DISCONTINUED | OUTPATIENT
Start: 2021-03-22 | End: 2021-03-24 | Stop reason: HOSPADM

## 2021-03-22 RX ORDER — BUPIVACAINE HYDROCHLORIDE 2.5 MG/ML
INJECTION, SOLUTION EPIDURAL; INFILTRATION; INTRACAUDAL
Status: DISCONTINUED | OUTPATIENT
Start: 2021-03-22 | End: 2021-03-22

## 2021-03-22 RX ORDER — PROPOFOL 10 MG/ML
INJECTION, EMULSION INTRAVENOUS AS NEEDED
Status: DISCONTINUED | OUTPATIENT
Start: 2021-03-22 | End: 2021-03-22

## 2021-03-22 RX ORDER — KETOROLAC TROMETHAMINE 30 MG/ML
15 INJECTION, SOLUTION INTRAMUSCULAR; INTRAVENOUS EVERY 6 HOURS PRN
Status: DISCONTINUED | OUTPATIENT
Start: 2021-03-22 | End: 2021-03-23

## 2021-03-22 RX ORDER — FENTANYL CITRATE/PF 50 MCG/ML
25 SYRINGE (ML) INJECTION
Status: DISCONTINUED | OUTPATIENT
Start: 2021-03-22 | End: 2021-03-22 | Stop reason: HOSPADM

## 2021-03-22 RX ORDER — NICOTINE 21 MG/24HR
1 PATCH, TRANSDERMAL 24 HOURS TRANSDERMAL DAILY
Status: DISCONTINUED | OUTPATIENT
Start: 2021-03-22 | End: 2021-03-24 | Stop reason: HOSPADM

## 2021-03-22 RX ORDER — ONDANSETRON 2 MG/ML
4 INJECTION INTRAMUSCULAR; INTRAVENOUS ONCE AS NEEDED
Status: DISCONTINUED | OUTPATIENT
Start: 2021-03-22 | End: 2021-03-22 | Stop reason: HOSPADM

## 2021-03-22 RX ORDER — SUCCINYLCHOLINE/SOD CL,ISO/PF 100 MG/5ML
SYRINGE (ML) INTRAVENOUS AS NEEDED
Status: DISCONTINUED | OUTPATIENT
Start: 2021-03-22 | End: 2021-03-22

## 2021-03-22 RX ORDER — KETAMINE HCL IN NACL, ISO-OSM 100MG/10ML
SYRINGE (ML) INJECTION AS NEEDED
Status: DISCONTINUED | OUTPATIENT
Start: 2021-03-22 | End: 2021-03-22

## 2021-03-22 RX ORDER — ACETAMINOPHEN 325 MG/1
650 TABLET ORAL EVERY 6 HOURS SCHEDULED
Status: DISCONTINUED | OUTPATIENT
Start: 2021-03-22 | End: 2021-03-24 | Stop reason: HOSPADM

## 2021-03-22 RX ORDER — HYDROMORPHONE HCL IN WATER/PF 6 MG/30 ML
0.2 PATIENT CONTROLLED ANALGESIA SYRINGE INTRAVENOUS EVERY 4 HOURS PRN
Status: DISCONTINUED | OUTPATIENT
Start: 2021-03-22 | End: 2021-03-23

## 2021-03-22 RX ORDER — DIPHENHYDRAMINE HYDROCHLORIDE 50 MG/ML
12.5 INJECTION INTRAMUSCULAR; INTRAVENOUS ONCE AS NEEDED
Status: DISCONTINUED | OUTPATIENT
Start: 2021-03-22 | End: 2021-03-22 | Stop reason: HOSPADM

## 2021-03-22 RX ADMIN — ACETAMINOPHEN 650 MG: 325 TABLET, COATED ORAL at 13:22

## 2021-03-22 RX ADMIN — TOPIRAMATE 25 MG: 25 TABLET, FILM COATED ORAL at 13:24

## 2021-03-22 RX ADMIN — GLYCOPYRROLATE 0.2 MG: 0.2 INJECTION, SOLUTION INTRAMUSCULAR; INTRAVENOUS at 08:24

## 2021-03-22 RX ADMIN — SODIUM CHLORIDE, SODIUM LACTATE, POTASSIUM CHLORIDE, AND CALCIUM CHLORIDE 125 ML/HR: .6; .31; .03; .02 INJECTION, SOLUTION INTRAVENOUS at 07:36

## 2021-03-22 RX ADMIN — PHENYLEPHRINE HYDROCHLORIDE 200 MCG: 10 INJECTION INTRAVENOUS at 08:05

## 2021-03-22 RX ADMIN — HYDROMORPHONE HYDROCHLORIDE 0.2 MG: 0.2 INJECTION, SOLUTION INTRAMUSCULAR; INTRAVENOUS; SUBCUTANEOUS at 14:58

## 2021-03-22 RX ADMIN — ACETAMINOPHEN 650 MG: 325 TABLET, COATED ORAL at 19:22

## 2021-03-22 RX ADMIN — FENTANYL CITRATE 50 MCG: 50 INJECTION, SOLUTION INTRAMUSCULAR; INTRAVENOUS at 07:57

## 2021-03-22 RX ADMIN — FENTANYL CITRATE 50 MCG: 50 INJECTION, SOLUTION INTRAMUSCULAR; INTRAVENOUS at 07:52

## 2021-03-22 RX ADMIN — FENTANYL CITRATE 25 MCG: 50 INJECTION, SOLUTION INTRAMUSCULAR; INTRAVENOUS at 11:00

## 2021-03-22 RX ADMIN — DEXMEDETOMIDINE HCL 8 MCG: 100 INJECTION INTRAVENOUS at 07:52

## 2021-03-22 RX ADMIN — HYDROMORPHONE HYDROCHLORIDE 0.2 MG: 0.2 INJECTION, SOLUTION INTRAMUSCULAR; INTRAVENOUS; SUBCUTANEOUS at 23:23

## 2021-03-22 RX ADMIN — FENTANYL CITRATE 25 MCG: 50 INJECTION, SOLUTION INTRAMUSCULAR; INTRAVENOUS at 11:05

## 2021-03-22 RX ADMIN — HYDROMORPHONE HYDROCHLORIDE 0.5 MG: 2 INJECTION, SOLUTION INTRAMUSCULAR; INTRAVENOUS; SUBCUTANEOUS at 09:32

## 2021-03-22 RX ADMIN — PHENYLEPHRINE HYDROCHLORIDE 100 MCG: 10 INJECTION INTRAVENOUS at 10:07

## 2021-03-22 RX ADMIN — PHENYLEPHRINE HYDROCHLORIDE 100 MCG: 10 INJECTION INTRAVENOUS at 10:32

## 2021-03-22 RX ADMIN — EPHEDRINE SULFATE 5 MG: 50 INJECTION INTRAVENOUS at 08:17

## 2021-03-22 RX ADMIN — SODIUM CHLORIDE, SODIUM LACTATE, POTASSIUM CHLORIDE, AND CALCIUM CHLORIDE 125 ML/HR: .6; .31; .03; .02 INJECTION, SOLUTION INTRAVENOUS at 13:36

## 2021-03-22 RX ADMIN — BUPIVACAINE HYDROCHLORIDE 40 ML: 2.5 INJECTION, SOLUTION EPIDURAL; INFILTRATION; INTRACAUDAL at 10:40

## 2021-03-22 RX ADMIN — EPHEDRINE SULFATE 5 MG: 50 INJECTION INTRAVENOUS at 08:08

## 2021-03-22 RX ADMIN — CEFAZOLIN SODIUM 2000 MG: 2 SOLUTION INTRAVENOUS at 07:34

## 2021-03-22 RX ADMIN — TOPIRAMATE 50 MG: 25 TABLET, FILM COATED ORAL at 13:24

## 2021-03-22 RX ADMIN — DEXAMETHASONE SODIUM PHOSPHATE 10 MG: 10 INJECTION, SOLUTION INTRAMUSCULAR; INTRAVENOUS at 07:53

## 2021-03-22 RX ADMIN — ONDANSETRON 4 MG: 2 INJECTION INTRAMUSCULAR; INTRAVENOUS at 09:49

## 2021-03-22 RX ADMIN — HYDROMORPHONE HYDROCHLORIDE 0.2 MG: 0.2 INJECTION, SOLUTION INTRAMUSCULAR; INTRAVENOUS; SUBCUTANEOUS at 19:23

## 2021-03-22 RX ADMIN — Medication 100 MG: at 07:52

## 2021-03-22 RX ADMIN — Medication 10 MG: at 09:21

## 2021-03-22 RX ADMIN — Medication 25 MG: at 08:55

## 2021-03-22 RX ADMIN — DEXMEDETOMIDINE HCL 8 MCG: 100 INJECTION INTRAVENOUS at 09:58

## 2021-03-22 RX ADMIN — PHENYLEPHRINE HYDROCHLORIDE 100 MCG: 10 INJECTION INTRAVENOUS at 08:21

## 2021-03-22 RX ADMIN — Medication 5 MG: at 09:58

## 2021-03-22 RX ADMIN — DEXMEDETOMIDINE HCL 12 MCG: 100 INJECTION INTRAVENOUS at 09:20

## 2021-03-22 RX ADMIN — EPHEDRINE SULFATE 5 MG: 50 INJECTION INTRAVENOUS at 10:07

## 2021-03-22 RX ADMIN — SODIUM CHLORIDE, SODIUM LACTATE, POTASSIUM CHLORIDE, AND CALCIUM CHLORIDE 125 ML/HR: .6; .31; .03; .02 INJECTION, SOLUTION INTRAVENOUS at 11:29

## 2021-03-22 RX ADMIN — EPHEDRINE SULFATE 10 MG: 50 INJECTION INTRAVENOUS at 08:42

## 2021-03-22 RX ADMIN — ROCURONIUM BROMIDE 30 MG: 10 SOLUTION INTRAVENOUS at 08:00

## 2021-03-22 RX ADMIN — PROPOFOL 170 MG: 10 INJECTION, EMULSION INTRAVENOUS at 07:52

## 2021-03-22 RX ADMIN — MIDAZOLAM HYDROCHLORIDE 2 MG: 1 INJECTION, SOLUTION INTRAMUSCULAR; INTRAVENOUS at 07:46

## 2021-03-22 RX ADMIN — CLONAZEPAM 0.5 MG: 0.5 TABLET ORAL at 17:46

## 2021-03-22 RX ADMIN — DOCUSATE SODIUM 100 MG: 100 CAPSULE, LIQUID FILLED ORAL at 17:46

## 2021-03-22 RX ADMIN — TRAZODONE HYDROCHLORIDE 100 MG: 100 TABLET ORAL at 20:34

## 2021-03-22 RX ADMIN — PHENYLEPHRINE HYDROCHLORIDE 100 MCG: 10 INJECTION INTRAVENOUS at 09:02

## 2021-03-22 RX ADMIN — DOCUSATE SODIUM 100 MG: 100 CAPSULE, LIQUID FILLED ORAL at 13:47

## 2021-03-22 RX ADMIN — SODIUM CHLORIDE, SODIUM LACTATE, POTASSIUM CHLORIDE, AND CALCIUM CHLORIDE: .6; .31; .03; .02 INJECTION, SOLUTION INTRAVENOUS at 09:40

## 2021-03-22 RX ADMIN — LIDOCAINE HYDROCHLORIDE 100 MG: 20 INJECTION, SOLUTION INTRAVENOUS at 07:52

## 2021-03-22 RX ADMIN — ENOXAPARIN SODIUM 40 MG: 40 INJECTION SUBCUTANEOUS at 13:47

## 2021-03-22 RX ADMIN — NICOTINE 1 PATCH: 14 PATCH, EXTENDED RELEASE TRANSDERMAL at 13:35

## 2021-03-22 RX ADMIN — ACETAMINOPHEN 650 MG: 325 TABLET, COATED ORAL at 23:22

## 2021-03-22 RX ADMIN — PHENYLEPHRINE HYDROCHLORIDE 100 MCG: 10 INJECTION INTRAVENOUS at 08:06

## 2021-03-22 RX ADMIN — KETOROLAC TROMETHAMINE 15 MG: 30 INJECTION, SOLUTION INTRAMUSCULAR at 13:25

## 2021-03-22 RX ADMIN — PHENYLEPHRINE HYDROCHLORIDE 200 MCG: 10 INJECTION INTRAVENOUS at 09:43

## 2021-03-22 RX ADMIN — CLONAZEPAM 0.5 MG: 0.5 TABLET ORAL at 20:34

## 2021-03-22 RX ADMIN — DULOXETINE HYDROCHLORIDE 60 MG: 60 CAPSULE, DELAYED RELEASE ORAL at 13:22

## 2021-03-22 NOTE — ANESTHESIA PREPROCEDURE EVALUATION
Procedure:  OPEN REPAIR VENTRAL/INCISIONAL HERNIA W/MESH (N/A Abdomen)    Relevant Problems   MUSCULOSKELETAL   (+) Arthritis   (+) Disc degeneration, lumbar   (+) Fibromyalgia      NEURO/PSYCH   (+) Fibromyalgia   (+) Generalized anxiety disorder   (+) MDD (major depressive disorder), recurrent, severe, with psychosis ([facility] [address] )        Physical Exam    Airway    Mallampati score: III  TM Distance: >3 FB  Neck ROM: full     Dental       Cardiovascular  Cardiovascular exam normal    Pulmonary  Pulmonary exam normal     Other Findings     1  Pre-op examination   labs were ordered  EKG done in the office was within normal limits  Exam was normal and she seems to be medically stable for surgery but I would like to take a look at the labs  Also, she is a smoker and was advised to quit smoking  - POCT ECG     2  Incisional hernia, without obstruction or gangrene    Will be going for surgery soon, does not have a date right now     3  MDD (major depressive disorder), recurrent, severe, with psychosis ([facility] )   continue Cymbalta     4  Generalized anxiety disorder   continue clonazepam  - CBC and differential; Future  - Comprehensive metabolic panel; Future  - TSH, 3rd generation; Future     5  Vitamin D deficiency   will check a vitamin-D level  - Vitamin D 25 hydroxy; Future     6  Fibromyalgia   continue Cymbalta and Topamax     7  Disc degeneration, lumbar   takes tramadol as needed which she has from her previous pain management doctor  She was advised to see pain management in the area  - Ambulatory referral to Pain Management; Future     8  Chronic idiopathic constipation   was advised to take MiraLax daily and increase the water and fiber intake in her diet     9  Cigarette nicotine dependence without complication   was counseled to quit smoking     10  Obesity (BMI 30-39  9)  BMI Counseling: Body mass index is 33 63 kg/m²   The BMI is above normal  Nutrition recommendations include decreasing portion sizes, encouraging healthy choices of fruits and vegetables and moderation in carbohydrate intake  Exercise recommendations include moderate physical activity 150 minutes/week  No pharmacotherapy was ordered  Tobacco Cessation Counseling: Tobacco cessation counseling was provided  The patient is sincerely urged to quit consumption of tobacco  She is not ready to quit tobacco            Anesthesia Plan  ASA Score- 2     Anesthesia Type- general and regional with ASA Monitors  Additional Monitors:   Airway Plan: ETT  Plan Factors-Exercise tolerance (METS): >4 METS  Chart reviewed  EKG reviewed  Imaging results reviewed  Existing labs reviewed  Patient summary reviewed  Patient is a current smoker  Induction- intravenous  Postoperative Plan- Plan for postoperative opioid use  Planned trial extubation    Informed Consent- Anesthetic plan and risks discussed with patient  I personally reviewed this patient with the CRNA  Discussed and agreed on the Anesthesia Plan with the CRNA  Jes Munoz

## 2021-03-22 NOTE — PLAN OF CARE
Problem: Potential for Falls  Goal: Patient will remain free of falls  Description: INTERVENTIONS:  - Assess patient frequently for physical needs  -  Identify cognitive and physical deficits and behaviors that affect risk of falls    -  [place] fall precautions as indicated by assessment   - Educate patient/family on patient safety including physical limitations  - Instruct patient to call for assistance with activity based on assessment  - Modify environment to reduce risk of injury  3/22/2021 1428 by Axel Beltran RN  Outcome: Progressing  3/22/2021 1428 by Axel Beltran RN  Outcome: Progressing     Problem: PAIN - ADULT  Goal: Verbalizes/displays adequate comfort level or baseline comfort level  Description: Interventions:  - Encourage patient to monitor pain and request assistance  - Assess pain using appropriate pain scale  - Administer analgesics based on type and severity of pain and evaluate response  - Implement non-pharmacological measures as appropriate and evaluate response  - Consider cultural and social influences on pain and pain management  - Notify physician/advanced practitioner if interventions unsuccessful or patient reports new pain  3/22/2021 1428 by Axel Beltran RN  Outcome: Progressing  3/22/2021 1428 by Axel Beltran RN  Outcome: Progressing     Problem: INFECTION - ADULT  Goal: Absence or prevention of progression during hospitalization  Description: INTERVENTIONS:  - Assess and monitor for signs and symptoms of infection  - Monitor lab/diagnostic results  - Monitor all insertion sites, i e  indwelling lines, tubes, and drains  - Administer medications as ordered  - Instruct and encourage patient and family to use good hand hygiene technique  - Identify and instruct in appropriate isolation precautions for identified infection/condition  3/22/2021 1428 by Axel Beltran RN  Outcome: Progressing  3/22/2021 1428 by Axel Beltran RN  Outcome: Progressing     Problem: SAFETY ADULT  Goal: Patient will remain free of falls  Description: INTERVENTIONS:  - Assess patient frequently for physical needs  -  Identify cognitive and physical deficits and behaviors that affect risk of falls  -  [place] fall precautions as indicated by assessment   - Educate patient/family on patient safety including physical limitations  - Instruct patient to call for assistance with activity based on assessment  - Modify environment to reduce risk of injury  3/22/2021 1428 by Ousmane Booth RN  Outcome: Progressing  3/22/2021 1428 by Ousmane Booth RN  Outcome: Progressing  Goal: Maintain or return to baseline ADL function  Description: INTERVENTIONS:  -  Assess patient's ability to carry out ADLs; assess patient's baseline for ADL function and identify physical deficits which impact ability to perform ADLs (bathing, care of mouth/teeth, toileting, grooming, dressing, etc )  - Assess/evaluate cause of self-care deficits   - Assess range of motion  - Assess patient's mobility; develop plan if impaired  - Assess patient's need for assistive devices and provide as appropriate  - Encourage maximum independence but intervene and supervise when necessary  - Involve family in performance of ADLs  - Assess for home care needs following discharge   - Consider OT consult to assist with ADL evaluation and planning for discharge  - Provide patient education as appropriate  3/22/2021 1428 by Ousmane Booth RN  Outcome: Progressing  3/22/2021 1428 by Ousmane Booth RN  Outcome: Progressing     Problem: Knowledge Deficit  Goal: Patient/family/caregiver demonstrates understanding of disease process, treatment plan, medications, and discharge instructions  Description: Complete learning assessment and assess knowledge base    Interventions:  - Provide teaching at level of understanding  - Provide teaching via preferred learning methods  3/22/2021 1428 by Ousmane Booth RN  Outcome: Progressing  3/22/2021 1428 by Vanessa Jacome RN  Outcome: Progressing     Problem: GASTROINTESTINAL - ADULT  Goal: Minimal or absence of nausea and/or vomiting  Description: INTERVENTIONS:  - Administer IV fluids if ordered to ensure adequate hydration  - Maintain NPO status until nausea and vomiting are resolved  - Nasogastric tube if ordered  - Administer ordered antiemetic medications as needed  - Provide nonpharmacologic comfort measures as appropriate  - Advance diet as tolerated, if ordered  - Consider nutrition services referral to assist patient with adequate nutrition and appropriate food choices  3/22/2021 1428 by Vanessa Jacome RN  Outcome: Progressing  3/22/2021 1428 by Vanessa Jacome RN  Outcome: Progressing  Goal: Maintains or returns to baseline bowel function  Description: INTERVENTIONS:  - Assess bowel function  - Encourage oral fluids to ensure adequate hydration  - Administer IV fluids if ordered to ensure adequate hydration  - Administer ordered medications as needed  - Encourage mobilization and activity  - Consider nutritional services referral to assist patient with adequate nutrition and appropriate food choices  3/22/2021 1428 by Vanessa Jacome RN  Outcome: Progressing  3/22/2021 1428 by Vanessa Jacome RN  Outcome: Progressing     Problem: METABOLIC, FLUID AND ELECTROLYTES - ADULT  Goal: Electrolytes maintained within normal limits  Description: INTERVENTIONS:  - Monitor labs and assess patient for signs and symptoms of electrolyte imbalances  - Administer electrolyte replacement as ordered  - Monitor response to electrolyte replacements, including repeat lab results as appropriate  - Instruct patient on fluid and nutrition as appropriate  3/22/2021 1428 by Vanessa Jacome RN  Outcome: Progressing  3/22/2021 1428 by Vanessa Jacome RN  Outcome: Progressing  Goal: Fluid balance maintained  Description: INTERVENTIONS:  - Monitor labs   - Monitor I/O and WT  - Instruct patient on fluid and nutrition as appropriate  - Assess for signs & symptoms of volume excess or deficit  3/22/2021 1428 by Carlos A Schaefer RN  Outcome: Progressing  3/22/2021 1428 by Carlos A Schaefer RN  Outcome: Progressing     Problem: SKIN/TISSUE INTEGRITY - ADULT  Goal: Skin integrity remains intact  Description: INTERVENTIONS  - Identify patients at risk for skin breakdown  - Assess and monitor skin integrity  - Assess and monitor nutrition and hydration status  - Monitor labs (i e  albumin)  - Assess for incontinence   - Turn and reposition patient  - Assist with mobility/ambulation  - Relieve pressure over bony prominences  - Avoid friction and shearing  - Provide appropriate hygiene as needed including keeping skin clean and dry  - Evaluate need for skin moisturizer/barrier cream  - Collaborate with interdisciplinary team (i e  Nutrition, Rehabilitation, etc )   - Patient/family teaching  3/22/2021 1428 by Carlos A Schaefer RN  Outcome: Progressing  3/22/2021 1428 by Carlos A Schaefer RN  Outcome: Progressing  Goal: Incision(s), wounds(s) or drain site(s) healing without S/S of infection  Description: INTERVENTIONS  - Assess and document risk factors for skin impairment   - Assess and document dressing, incision, wound bed, drain sites and surrounding tissue  - Consider nutrition services referral as needed  - Provide patient/ family education  3/22/2021 1428 by Carlos A Schaefer RN  Outcome: Progressing  3/22/2021 1428 by Carlos A Schaefer RN  Outcome: Progressing

## 2021-03-22 NOTE — ANESTHESIA PROCEDURE NOTES
Peripheral Block    Patient location during procedure: OR  Start time: 3/22/2021 10:40 AM  Reason for block: at surgeon's request and post-op pain management  Staffing  Anesthesiologist: Buddy Patten MD  Performed: anesthesiologist   Preanesthetic Checklist  Completed: patient identified, surgical consent, pre-op evaluation, timeout performed, IV checked, risks and benefits discussed and monitors and equipment checked  Peripheral Block  Patient position: supine  Prep: ChloraPrep  Patient monitoring: heart rate, cardiac monitor, continuous pulse ox and frequent blood pressure checks  Block type: TAP  Laterality: bilateral  Procedures: ultrasound guided, Ultrasound guidance required for the procedure to increase accuracy and safety of medication placement and decrease risk of complications  bupivacaine (MARCAINE) 0 25 % perineural infiltration, 40 mL  Needle  Needle type: Stimuplex   Needle gauge: 22 G  Needle length: 10 cm  Needle localization: anatomical landmarks and ultrasound guidance  Test dose: negative  Assessment  Injection assessment: incremental injection and negative aspiration for heme  Heart rate change: no  Slow fractionated injection: yes  patient tolerated the procedure well with no immediate complications

## 2021-03-22 NOTE — INTERVAL H&P NOTE
H&P reviewed  After examining the patient I find no changes in the patients condition since the H&P had been written   (Last BM 2 days ago )     Vitals:    03/22/21 0721   BP: 101/64   Pulse: 75   Resp: 16   Temp: 97 7 °F (36 5 °C)   SpO2: 100%

## 2021-03-22 NOTE — NURSING NOTE
A x o x4  Lungs WDL  OOB to recliner for ~ 2 hours  Abdominal binder in place  Patient ambulated 500 ft in the bustos  Tolerated ambulation fairly well  Medicated for abdominal LLQ pain 7/10  Returned to bed after ambulation  No acute or respiratory distress noted at this time

## 2021-03-22 NOTE — ANESTHESIA POSTPROCEDURE EVALUATION
Post-Op Assessment Note    CV Status:  Stable  Pain Score: 0    Pain management: adequate     Mental Status:  Alert and awake   Hydration Status:  Euvolemic   PONV Controlled:  Controlled   Airway Patency:  Patent and adequate      Post Op Vitals Reviewed: Yes      Staff: CRNA         No complications documented      BP (P) 97/63 (03/22/21 1050)    Temp (P) 97 5 °F (36 4 °C) (03/22/21 1050)    Pulse (P) 100 (03/22/21 1050)   Resp (P) 18 (03/22/21 1050)    SpO2   98%

## 2021-03-23 PROBLEM — I95.9 HYPOTENSION: Status: ACTIVE | Noted: 2021-03-23

## 2021-03-23 PROBLEM — K43.9 VENTRAL HERNIA: Status: ACTIVE | Noted: 2021-03-23

## 2021-03-23 LAB
ANION GAP SERPL CALCULATED.3IONS-SCNC: 8 MMOL/L (ref 4–13)
BASOPHILS # BLD AUTO: 0.04 THOUSANDS/ΜL (ref 0–0.1)
BASOPHILS NFR BLD AUTO: 0 % (ref 0–1)
BUN SERPL-MCNC: 12 MG/DL (ref 5–25)
CALCIUM SERPL-MCNC: 8.4 MG/DL (ref 8.3–10.1)
CHLORIDE SERPL-SCNC: 111 MMOL/L (ref 100–108)
CO2 SERPL-SCNC: 24 MMOL/L (ref 21–32)
CREAT SERPL-MCNC: 0.86 MG/DL (ref 0.6–1.3)
EOSINOPHIL # BLD AUTO: 0.02 THOUSAND/ΜL (ref 0–0.61)
EOSINOPHIL NFR BLD AUTO: 0 % (ref 0–6)
ERYTHROCYTE [DISTWIDTH] IN BLOOD BY AUTOMATED COUNT: 14.3 % (ref 11.6–15.1)
GFR SERPL CREATININE-BSD FRML MDRD: 81 ML/MIN/1.73SQ M
GLUCOSE SERPL-MCNC: 97 MG/DL (ref 65–140)
HCT VFR BLD AUTO: 35 % (ref 34.8–46.1)
HGB BLD-MCNC: 11.3 G/DL (ref 11.5–15.4)
IMM GRANULOCYTES # BLD AUTO: 0.05 THOUSAND/UL (ref 0–0.2)
IMM GRANULOCYTES NFR BLD AUTO: 0 % (ref 0–2)
LYMPHOCYTES # BLD AUTO: 2.7 THOUSANDS/ΜL (ref 0.6–4.47)
LYMPHOCYTES NFR BLD AUTO: 24 % (ref 14–44)
MCH RBC QN AUTO: 29.2 PG (ref 26.8–34.3)
MCHC RBC AUTO-ENTMCNC: 32.3 G/DL (ref 31.4–37.4)
MCV RBC AUTO: 90 FL (ref 82–98)
MONOCYTES # BLD AUTO: 0.69 THOUSAND/ΜL (ref 0.17–1.22)
MONOCYTES NFR BLD AUTO: 6 % (ref 4–12)
NEUTROPHILS # BLD AUTO: 7.83 THOUSANDS/ΜL (ref 1.85–7.62)
NEUTS SEG NFR BLD AUTO: 70 % (ref 43–75)
NRBC BLD AUTO-RTO: 0 /100 WBCS
PLATELET # BLD AUTO: 224 THOUSANDS/UL (ref 149–390)
PMV BLD AUTO: 10.8 FL (ref 8.9–12.7)
POTASSIUM SERPL-SCNC: 3.8 MMOL/L (ref 3.5–5.3)
RBC # BLD AUTO: 3.87 MILLION/UL (ref 3.81–5.12)
SODIUM SERPL-SCNC: 143 MMOL/L (ref 136–145)
WBC # BLD AUTO: 11.33 THOUSAND/UL (ref 4.31–10.16)

## 2021-03-23 PROCEDURE — 85025 COMPLETE CBC W/AUTO DIFF WBC: CPT | Performed by: PHYSICIAN ASSISTANT

## 2021-03-23 PROCEDURE — 99024 POSTOP FOLLOW-UP VISIT: CPT | Performed by: SURGERY

## 2021-03-23 PROCEDURE — 80048 BASIC METABOLIC PNL TOTAL CA: CPT | Performed by: PHYSICIAN ASSISTANT

## 2021-03-23 PROCEDURE — 99255 IP/OBS CONSLTJ NEW/EST HI 80: CPT | Performed by: STUDENT IN AN ORGANIZED HEALTH CARE EDUCATION/TRAINING PROGRAM

## 2021-03-23 RX ORDER — HYDROMORPHONE HCL/PF 1 MG/ML
0.5 SYRINGE (ML) INJECTION ONCE
Status: COMPLETED | OUTPATIENT
Start: 2021-03-23 | End: 2021-03-23

## 2021-03-23 RX ORDER — OXYCODONE HYDROCHLORIDE 5 MG/1
5 TABLET ORAL EVERY 6 HOURS PRN
Status: DISCONTINUED | OUTPATIENT
Start: 2021-03-23 | End: 2021-03-23

## 2021-03-23 RX ORDER — OXYCODONE HYDROCHLORIDE 10 MG/1
10 TABLET ORAL EVERY 6 HOURS PRN
Status: DISCONTINUED | OUTPATIENT
Start: 2021-03-23 | End: 2021-03-24 | Stop reason: HOSPADM

## 2021-03-23 RX ORDER — KETOROLAC TROMETHAMINE 30 MG/ML
15 INJECTION, SOLUTION INTRAMUSCULAR; INTRAVENOUS EVERY 6 HOURS PRN
Status: DISCONTINUED | OUTPATIENT
Start: 2021-03-23 | End: 2021-03-24 | Stop reason: HOSPADM

## 2021-03-23 RX ORDER — OXYCODONE HYDROCHLORIDE 5 MG/1
5 TABLET ORAL EVERY 4 HOURS PRN
Status: DISCONTINUED | OUTPATIENT
Start: 2021-03-23 | End: 2021-03-24 | Stop reason: HOSPADM

## 2021-03-23 RX ADMIN — DOCUSATE SODIUM 100 MG: 100 CAPSULE, LIQUID FILLED ORAL at 08:56

## 2021-03-23 RX ADMIN — DOCUSATE SODIUM 100 MG: 100 CAPSULE, LIQUID FILLED ORAL at 18:09

## 2021-03-23 RX ADMIN — TOPIRAMATE 50 MG: 25 TABLET, FILM COATED ORAL at 08:55

## 2021-03-23 RX ADMIN — ACETAMINOPHEN 650 MG: 325 TABLET, COATED ORAL at 18:09

## 2021-03-23 RX ADMIN — CLONAZEPAM 0.5 MG: 0.5 TABLET ORAL at 08:56

## 2021-03-23 RX ADMIN — ACETAMINOPHEN 650 MG: 325 TABLET, COATED ORAL at 11:14

## 2021-03-23 RX ADMIN — TOPIRAMATE 25 MG: 25 TABLET, FILM COATED ORAL at 08:55

## 2021-03-23 RX ADMIN — NICOTINE 1 PATCH: 14 PATCH, EXTENDED RELEASE TRANSDERMAL at 08:56

## 2021-03-23 RX ADMIN — DULOXETINE HYDROCHLORIDE 60 MG: 60 CAPSULE, DELAYED RELEASE ORAL at 08:55

## 2021-03-23 RX ADMIN — ACETAMINOPHEN 650 MG: 325 TABLET, COATED ORAL at 05:48

## 2021-03-23 RX ADMIN — TRAZODONE HYDROCHLORIDE 100 MG: 100 TABLET ORAL at 21:03

## 2021-03-23 RX ADMIN — HYDROMORPHONE HYDROCHLORIDE 0.5 MG: 1 INJECTION, SOLUTION INTRAMUSCULAR; INTRAVENOUS; SUBCUTANEOUS at 10:13

## 2021-03-23 RX ADMIN — OXYCODONE HYDROCHLORIDE 10 MG: 10 TABLET ORAL at 19:36

## 2021-03-23 RX ADMIN — OXYCODONE HYDROCHLORIDE 5 MG: 5 TABLET ORAL at 16:14

## 2021-03-23 RX ADMIN — ENOXAPARIN SODIUM 40 MG: 40 INJECTION SUBCUTANEOUS at 08:56

## 2021-03-23 RX ADMIN — KETOROLAC TROMETHAMINE 15 MG: 30 INJECTION, SOLUTION INTRAMUSCULAR at 05:48

## 2021-03-23 RX ADMIN — CLONAZEPAM 0.5 MG: 0.5 TABLET ORAL at 21:03

## 2021-03-23 RX ADMIN — KETOROLAC TROMETHAMINE 15 MG: 30 INJECTION, SOLUTION INTRAMUSCULAR; INTRAVENOUS at 14:29

## 2021-03-23 RX ADMIN — SODIUM CHLORIDE, SODIUM LACTATE, POTASSIUM CHLORIDE, AND CALCIUM CHLORIDE 125 ML/HR: .6; .31; .03; .02 INJECTION, SOLUTION INTRAVENOUS at 05:49

## 2021-03-23 RX ADMIN — CLONAZEPAM 0.5 MG: 0.5 TABLET ORAL at 16:15

## 2021-03-23 NOTE — ASSESSMENT & PLAN NOTE
· Management per surgery service  · Pain is poorly controlled but tolerating escalation of diet  · Given extra dose of dilaudid due to hiccups exacerbating abdominal pain

## 2021-03-23 NOTE — PROGRESS NOTES
Progress Note - General Surgery   Sean Jessica 50 y o  female MRN: [id number]  Unit/Bed#: -01 Encounter: [id number]  Date Admitted to Hospital:  Hospital Day #:    Assessment:  Sean Jessica is a 50 y o  female with PMH MDD, BASIA, fibromyalgia, somatoform disorder, VDD, arthritis, nicotine dependence presenting with:    POD1, s/p infraumbilical  incisional hernia repair  Afebrile  BP runs low at baseline; medicine following   UO 1 9 L   Wbc 11 33 (8 14)  Hgb 9 4 (10 1, 10 4)   Surg path for rectus sheath mass: calcified/necrotic soft tissues cannot exclude abscess or granuloma, no malignancy   Anaerobic cx: no results   Peritoneal fluid gram stain: no bacteria   + BS, +flatus, no BM    Plan:  -scheduled tylenol, added oxy for mod-severe pain, toradol for breakthrough  -antiemetics prn  -dvt ppx, lovenox  -IVF, will d/c when tolerating oral diet   -transitioned to CLD, may advance this evening if tolerating  -abdominal binder while awake   -encourage IS 10x hr  -encourage oob to chair, ambulation  -I/Os  -monitor vitals/labs (trend wbc, hgb)  -appreciate int med recs    Subjective/Objective   CC: Pain not controlled  Subjective: No acute events overnight  Pain has been uncontrolled 7/10  Little relief even with IV pain meds  No n/v, passing flatus  Has mild HA from not having her morning coffee  Denies any subjective fevers/chills, sob, coughing, chest pain, palpitations, n/v/d/c, dysuria, or BLE tenderness  Objective:     Blood pressure 104/64, pulse 72, temperature 98 7 °F (37 1 °C), resp  rate 18, height 5' 1" (1 549 m), weight 80 3 kg (177 lb 0 5 oz), SpO2 95 %, not currently breastfeeding  ,Body mass index is 33 45 kg/m²        Intake/Output Summary (Last 24 hours) at 3/23/2021 1015  Last data filed at 3/23/2021 0601  Gross per 24 hour   Intake 500 ml   Output 1780 ml   Net -1280 ml       Invasive Devices     Peripheral Intravenous Line            Peripheral IV 03/22/21 Right Hand 1 day Physical Exam:   Physical Exam  Vitals signs and nursing note reviewed  Constitutional:       General: She is not in acute distress  Appearance: Normal appearance  Comments: Seated upright in chair comfortably   HENT:      Head: Normocephalic and atraumatic  Right Ear: External ear normal       Left Ear: External ear normal       Nose: No congestion or rhinorrhea  Mouth/Throat:      Mouth: Mucous membranes are moist       Pharynx: Oropharynx is clear  Eyes:      General: No scleral icterus  Extraocular Movements: Extraocular movements intact  Conjunctiva/sclera: Conjunctivae normal       Pupils: Pupils are equal, round, and reactive to light  Neck:      Musculoskeletal: Normal range of motion and neck supple  Cardiovascular:      Rate and Rhythm: Normal rate and regular rhythm  Heart sounds: Normal heart sounds  No murmur  No friction rub  No gallop  Pulmonary:      Effort: Pulmonary effort is normal       Breath sounds: Normal breath sounds  No wheezing, rhonchi or rales  Abdominal:      Palpations: There is no mass  Tenderness: There is abdominal tenderness (tender to deep pal to lower quadrants)  There is no guarding or rebound  Comments: Active BS, soft, NT abdomen  Midline incision with subq sutures, steris, Mepilex in place  C/D/I  Nontender, no erythema, induration, fluctuance, or increasing drainage  Musculoskeletal: Normal range of motion  General: No swelling or tenderness  Right lower leg: No edema  Left lower leg: No edema  Comments: SCDs in place   Skin:     General: Skin is warm and dry  Coloration: Skin is not jaundiced  Neurological:      General: No focal deficit present  Mental Status: She is alert and oriented to person, place, and time  Psychiatric:         Mood and Affect: Mood normal          Behavior: Behavior normal          Thought Content:  Thought content normal        Lab, Imaging and other studies:I have personally reviewed pertinent lab results  No results found  VTE Pharmacologic Prophylaxis: Enoxaparin (Lovenox)  VTE Mechanical Prophylaxis: sequential compression device    Recent Results (from the past 36 hour(s))   Tissue Exam    Collection Time: 03/22/21  8:31 AM   Result Value Ref Range    Case Report       Surgical Pathology Report                         Case: [id number]                                   Authorizing Provider:  Guanaco Galicia MD       Collected:           03/22/2021 0831              Ordering Location:     [address] [place] [address] Received:            03/22/2021 6162                                     Operating Room                                                               Pathologist:           Kwabena Alfonso MD                                                        Intraop:               Kwabena Alfonso MD                                                        Specimen:    Hernia Sac, Umbilical, rectus sheath mass                                                  Intraoperative Consultation          AF1:  Calcified and necrotic soft tissues cannot exclude abscess or granuloma  No malignancy see  Carey Booth MD [id number] 3/22/21         Wound culture and Gram stain    Collection Time: 03/22/21  9:37 AM    Specimen: Peritoneum;  Wound   Result Value Ref Range    Gram Stain Result No Polys or Bacteria seen    Basic metabolic panel    Collection Time: 03/23/21  5:20 AM   Result Value Ref Range    Sodium 143 136 - 145 mmol/L    Potassium 3 8 3 5 - 5 3 mmol/L    Chloride 111 (H) 100 - 108 mmol/L    CO2 24 21 - 32 mmol/L    ANION GAP 8 4 - 13 mmol/L    BUN 12 5 - 25 mg/dL    Creatinine 0 86 0 60 - 1 30 mg/dL    Glucose 97 65 - 140 mg/dL    Calcium 8 4 8 3 - 10 1 mg/dL    eGFR 81 ml/min/1 73sq m   CBC and differential    Collection Time: 03/23/21  5:20 AM   Result Value Ref Range    WBC 11 33 (H) 4 31 - 10 16 Thousand/uL    RBC 3 87 3 81 - 5 12 Million/uL Hemoglobin 11 3 (L) 11 5 - 15 4 g/dL    Hematocrit 35 0 34 8 - 46 1 %    MCV 90 82 - 98 fL    MCH 29 2 26 8 - 34 3 pg    MCHC 32 3 31 4 - 37 4 g/dL    RDW 14 3 11 6 - 15 1 %    MPV 10 8 8 9 - 12 7 fL    Platelets 399 769 - 196 Thousands/uL    nRBC 0 /100 WBCs    Neutrophils Relative 70 43 - 75 %    Immat GRANS % 0 0 - 2 %    Lymphocytes Relative 24 14 - 44 %    Monocytes Relative 6 4 - 12 %    Eosinophils Relative 0 0 - 6 %    Basophils Relative 0 0 - 1 %    Neutrophils Absolute 7 83 (H) 1 85 - 7 62 Thousands/µL    Immature Grans Absolute 0 05 0 00 - 0 20 Thousand/uL    Lymphocytes Absolute 2 70 0 60 - 4 47 Thousands/µL    Monocytes Absolute 0 69 0 17 - 1 22 Thousand/µL    Eosinophils Absolute 0 02 0 00 - 0 61 Thousand/µL    Basophils Absolute 0 04 0 00 - 0 10 Thousands/µL       JULIAN Alatorre  3/23/2021   10:44 AM

## 2021-03-23 NOTE — CASE MANAGEMENT
Per rounding with SLIM and surgery, patient is anticipated to be discharged in 24-48 hours  No Cm needs anticipated  Cm department will continue to follow patient through discharge

## 2021-03-23 NOTE — CONSULTS
[address] 1975, 50 y o  female MRN: [id number]  Unit/Bed#: -01 Encounter: [id number]  Primary Care Provider: Hal Lewis MD   Date and time admitted to hospital: 3/22/2021  7:03 AM    Inpatient consult to Internal Medicine  Consult performed by: Ap Cuevas MD  Consult ordered by: Shirlene Hubbard PA-C          Hypotension  Assessment & Plan  · Patient reports having low normal blood pressure at baseline  · Exacerbated by dilaudid  · Monitor blood pressure    * Ventral hernia  Assessment & Plan  · Management per surgery service  · Pain is poorly controlled but tolerating escalation of diet  · Given extra dose of dilaudid due to hiccups exacerbating abdominal pain    Cigarette nicotine dependence without complication  Assessment & Plan  · Counseled on smoking cessation  · Continue nicotine patch     Obesity (BMI 30-39  9)  Assessment & Plan  · Counseled on lifestyle modifications   · Follow up with PCP    Generalized anxiety disorder  Assessment & Plan  · Controlled on Cymbalta, continue    MDD (major depressive disorder), recurrent, severe, with psychosis ([facility] [address] )  Assessment & Plan  · Controlled on cymbalta, continue        VTE Prophylaxis: Enoxaparin (Lovenox)  / sequential compression device     Recommendations for Discharge:  · Per primary team    Counseling / Coordination of Care Time: 30 minutes  Greater than 50% of total time spent on patient counseling and coordination of care  Collaboration of Care: Were Recommendations Directly Discussed with Primary Treatment Team? - Yes     History of Present Illness:    Tam Wayne is a 50 y o  female who is originally admitted to the surgery service due to ventral hernia  We are consulted for medical management  Review of Systems:    Review of Systems   Constitutional: Positive for appetite change  Negative for chills and fever  HENT: Negative for ear pain and sore throat      Eyes: Negative for pain and visual disturbance  Respiratory: Negative for cough and shortness of breath  Cardiovascular: Negative for chest pain and palpitations  Gastrointestinal: Positive for abdominal pain and constipation  Negative for vomiting  Endocrine: Negative for cold intolerance and heat intolerance  Genitourinary: Negative for dysuria and hematuria  Musculoskeletal: Negative for arthralgias and back pain  Skin: Negative for color change and rash  Neurological: Negative for seizures and syncope  Psychiatric/Behavioral: Negative for agitation and behavioral problems  All other systems reviewed and are negative        Past Medical and Surgical History:     Past Medical History:   Diagnosis Date    Bladder perforation, intraoperative     Depression     Intestine, perforation ([facility] [address] )     intra op    Sepsis ([facility] [address] )        Past Surgical History:   Procedure Laterality Date    ABDOMINAL SURGERY      BLADDER SURGERY       SECTION      HYSTERECTOMY      INCISIONAL HERNIA REPAIR N/A 3/22/2021    Procedure: OPEN REPAIR VENTRAL/INCISIONAL HERNIA W/MESH;  Surgeon: Bogdan Kaur MD;  Location: [facility];  Service: General       Meds/Allergies:    all medications and allergies reviewed    Allergies: No Known Allergies    Social History:     Marital Status: /Civil Union    Substance Use History:   Social History     Substance and Sexual Activity   Alcohol Use Not Currently     Social History     Tobacco Use   Smoking Status Current Every Day Smoker    Packs/day: 0 50   Smokeless Tobacco Never Used     Social History     Substance and Sexual Activity   Drug Use Never       Family History:    non-contributory    Physical Exam:     Vitals:   Blood Pressure: 104/64 (21)  Pulse: 72 (21)  Temperature: 98 7 °F (37 1 °C) (21)  Temp Source: Oral (21 1230)  Respirations: 18 (21)  Height: 5' 1" (154 9 cm) (21 07)  Weight - Scale: 80 3 kg (177 lb 0 5 oz) (03/22/21 0721)  SpO2: 95 % (03/23/21 0737)    Physical Exam  Vitals signs and nursing note reviewed  Constitutional:       General: She is not in acute distress  Appearance: She is well-developed  HENT:      Head: Normocephalic and atraumatic  Eyes:      Conjunctiva/sclera: Conjunctivae normal    Neck:      Musculoskeletal: Neck supple  Cardiovascular:      Rate and Rhythm: Normal rate and regular rhythm  Heart sounds: No murmur  Pulmonary:      Effort: Pulmonary effort is normal  No respiratory distress  Breath sounds: Normal breath sounds  Abdominal:      General: Abdomen is flat  Palpations: Abdomen is soft  Tenderness: There is abdominal tenderness  Musculoskeletal:      Right lower leg: No edema  Left lower leg: No edema  Skin:     General: Skin is warm and dry  Neurological:      Mental Status: She is alert and oriented to person, place, and time  Mental status is at baseline  Psychiatric:         Mood and Affect: Mood normal          Behavior: Behavior normal          Additional Data:     Lab Results: I have personally reviewed pertinent reports  Results from last 7 days   Lab Units 03/23/21  0520   WBC Thousand/uL 11 33*   HEMOGLOBIN g/dL 11 3*   HEMATOCRIT % 35 0   PLATELETS Thousands/uL 224   NEUTROS PCT % 70   LYMPHS PCT % 24   MONOS PCT % 6   EOS PCT % 0     Results from last 7 days   Lab Units 03/23/21  0520   SODIUM mmol/L 143   POTASSIUM mmol/L 3 8   CHLORIDE mmol/L 111*   CO2 mmol/L 24   BUN mg/dL 12   CREATININE mg/dL 0 86   ANION GAP mmol/L 8   CALCIUM mg/dL 8 4   GLUCOSE RANDOM mg/dL 97             No results found for: HGBA1C            Imaging: I have personally reviewed pertinent reports  No orders to display       EKG, Pathology, and Other Studies Reviewed on Admission:   · EKG: na    ** Please Note: This note has been constructed using a voice recognition system   **

## 2021-03-23 NOTE — CASE MANAGEMENT
LOS: 1 DAY  PATIENT IS NOT A BUNDLE  PATIENT IS NOT A 30 DAY READMISSION  UNPLANNED READMISSION RISK SCORE IS 6 (GREEN/LOW)  Cm met with patient at bedside to complete general assessment and discharge planning  Patient reported that she lives with her spouse and her son in a 2 story home with about 10 LESTER  Patient is able to navigate these stairs without difficulty  Patient uses no DME and was IPTA with ADLs  Patient denied rehab Hx and reported Hx of HHC in [place] following a surgery years ago  Patient fills her prescriptions at [facility] in [place] and denied any barriers to obtaining or affording prescriptions  Patient's PCP is Caryn  Patient reported Hx of anxiety and depression  Patient also smokes a 1/2 pack of cigarettes per day  Patient does not have a POA, but her  is her HCR  Patient is currently employed and able to drive  Patient's  will provide transportation at discharge  CM reviewed discharge planning process including the following: identifying help at home, patient preference for discharge planning needs, pharmacy preference, and availability of treatment team to discuss questions or concerns patient and/or family may have regarding understanding medications and recognizing signs and symptoms once discharged  CM also encouraged patient to follow up with all recommended appointments after discharge  Patient advised of importance for patient and family to participate in managing patients medical well being  Patient denying any needs at this time  Cm department will continue to follow patient through discharge

## 2021-03-23 NOTE — ASSESSMENT & PLAN NOTE
· Patient reports having low normal blood pressure at baseline  · Exacerbated by dilaudid  · Monitor blood pressure

## 2021-03-24 ENCOUNTER — TELEPHONE (OUTPATIENT)
Dept: OTHER | Facility: OTHER | Age: 46
End: 2021-03-24

## 2021-03-24 ENCOUNTER — TRANSITIONAL CARE MANAGEMENT (OUTPATIENT)
Dept: INTERNAL MEDICINE CLINIC | Facility: CLINIC | Age: 46
End: 2021-03-24

## 2021-03-24 VITALS
RESPIRATION RATE: 18 BRPM | OXYGEN SATURATION: 96 % | SYSTOLIC BLOOD PRESSURE: 116 MMHG | TEMPERATURE: 99.8 F | HEIGHT: 61 IN | HEART RATE: 82 BPM | BODY MASS INDEX: 33.42 KG/M2 | DIASTOLIC BLOOD PRESSURE: 75 MMHG | WEIGHT: 177.03 LBS

## 2021-03-24 LAB
ALBUMIN SERPL BCP-MCNC: 2.9 G/DL (ref 3.5–5)
ALP SERPL-CCNC: 65 U/L (ref 46–116)
ALT SERPL W P-5'-P-CCNC: 13 U/L (ref 12–78)
ANION GAP SERPL CALCULATED.3IONS-SCNC: 8 MMOL/L (ref 4–13)
AST SERPL W P-5'-P-CCNC: 11 U/L (ref 5–45)
BASOPHILS # BLD AUTO: 0.04 THOUSANDS/ΜL (ref 0–0.1)
BASOPHILS NFR BLD AUTO: 1 % (ref 0–1)
BILIRUB SERPL-MCNC: 0.23 MG/DL (ref 0.2–1)
BUN SERPL-MCNC: 13 MG/DL (ref 5–25)
CALCIUM ALBUM COR SERPL-MCNC: 9.4 MG/DL (ref 8.3–10.1)
CALCIUM SERPL-MCNC: 8.5 MG/DL (ref 8.3–10.1)
CHLORIDE SERPL-SCNC: 110 MMOL/L (ref 100–108)
CO2 SERPL-SCNC: 26 MMOL/L (ref 21–32)
CREAT SERPL-MCNC: 0.85 MG/DL (ref 0.6–1.3)
EOSINOPHIL # BLD AUTO: 0.26 THOUSAND/ΜL (ref 0–0.61)
EOSINOPHIL NFR BLD AUTO: 3 % (ref 0–6)
ERYTHROCYTE [DISTWIDTH] IN BLOOD BY AUTOMATED COUNT: 14.4 % (ref 11.6–15.1)
GFR SERPL CREATININE-BSD FRML MDRD: 82 ML/MIN/1.73SQ M
GLUCOSE SERPL-MCNC: 91 MG/DL (ref 65–140)
HCT VFR BLD AUTO: 35.7 % (ref 34.8–46.1)
HGB BLD-MCNC: 11.3 G/DL (ref 11.5–15.4)
IMM GRANULOCYTES # BLD AUTO: 0.02 THOUSAND/UL (ref 0–0.2)
IMM GRANULOCYTES NFR BLD AUTO: 0 % (ref 0–2)
LYMPHOCYTES # BLD AUTO: 2.18 THOUSANDS/ΜL (ref 0.6–4.47)
LYMPHOCYTES NFR BLD AUTO: 26 % (ref 14–44)
MAGNESIUM SERPL-MCNC: 1.9 MG/DL (ref 1.6–2.6)
MCH RBC QN AUTO: 28.9 PG (ref 26.8–34.3)
MCHC RBC AUTO-ENTMCNC: 31.7 G/DL (ref 31.4–37.4)
MCV RBC AUTO: 91 FL (ref 82–98)
MONOCYTES # BLD AUTO: 0.58 THOUSAND/ΜL (ref 0.17–1.22)
MONOCYTES NFR BLD AUTO: 7 % (ref 4–12)
NEUTROPHILS # BLD AUTO: 5.33 THOUSANDS/ΜL (ref 1.85–7.62)
NEUTS SEG NFR BLD AUTO: 63 % (ref 43–75)
NRBC BLD AUTO-RTO: 0 /100 WBCS
PLATELET # BLD AUTO: 234 THOUSANDS/UL (ref 149–390)
PMV BLD AUTO: 11 FL (ref 8.9–12.7)
POTASSIUM SERPL-SCNC: 3.9 MMOL/L (ref 3.5–5.3)
PROT SERPL-MCNC: 6.1 G/DL (ref 6.4–8.2)
RBC # BLD AUTO: 3.91 MILLION/UL (ref 3.81–5.12)
SODIUM SERPL-SCNC: 144 MMOL/L (ref 136–145)
WBC # BLD AUTO: 8.41 THOUSAND/UL (ref 4.31–10.16)

## 2021-03-24 PROCEDURE — 99233 SBSQ HOSP IP/OBS HIGH 50: CPT | Performed by: STUDENT IN AN ORGANIZED HEALTH CARE EDUCATION/TRAINING PROGRAM

## 2021-03-24 PROCEDURE — NC001 PR NO CHARGE: Performed by: SURGERY

## 2021-03-24 PROCEDURE — 83735 ASSAY OF MAGNESIUM: CPT | Performed by: STUDENT IN AN ORGANIZED HEALTH CARE EDUCATION/TRAINING PROGRAM

## 2021-03-24 PROCEDURE — 85025 COMPLETE CBC W/AUTO DIFF WBC: CPT | Performed by: STUDENT IN AN ORGANIZED HEALTH CARE EDUCATION/TRAINING PROGRAM

## 2021-03-24 PROCEDURE — 80053 COMPREHEN METABOLIC PANEL: CPT | Performed by: STUDENT IN AN ORGANIZED HEALTH CARE EDUCATION/TRAINING PROGRAM

## 2021-03-24 RX ORDER — OXYCODONE HYDROCHLORIDE 5 MG/1
5 TABLET ORAL EVERY 4 HOURS PRN
Qty: 10 TABLET | Refills: 0 | Status: SHIPPED | OUTPATIENT
Start: 2021-03-24 | End: 2021-03-27

## 2021-03-24 RX ORDER — OXYCODONE HYDROCHLORIDE 5 MG/1
5 TABLET ORAL EVERY 6 HOURS PRN
Qty: 15 TABLET | Refills: 0 | Status: CANCELLED | OUTPATIENT
Start: 2021-03-24

## 2021-03-24 RX ADMIN — DOCUSATE SODIUM 100 MG: 100 CAPSULE, LIQUID FILLED ORAL at 09:17

## 2021-03-24 RX ADMIN — ACETAMINOPHEN 650 MG: 325 TABLET, COATED ORAL at 05:49

## 2021-03-24 RX ADMIN — DULOXETINE HYDROCHLORIDE 60 MG: 60 CAPSULE, DELAYED RELEASE ORAL at 09:16

## 2021-03-24 RX ADMIN — TOPIRAMATE 25 MG: 25 TABLET, FILM COATED ORAL at 09:16

## 2021-03-24 RX ADMIN — TOPIRAMATE 50 MG: 25 TABLET, FILM COATED ORAL at 09:17

## 2021-03-24 RX ADMIN — CLONAZEPAM 0.5 MG: 0.5 TABLET ORAL at 09:17

## 2021-03-24 RX ADMIN — ENOXAPARIN SODIUM 40 MG: 40 INJECTION SUBCUTANEOUS at 09:16

## 2021-03-24 NOTE — CASE MANAGEMENT
Cm met with patient at bedside to review IMM and DCP  Patient agreeable to return home today with no needs  Patient's  will provide transportation later today  Patient signed the IMM and was provided with a copy  Cm placed the original signed copy in medical records  Cm department will continue to follow patient through discharge

## 2021-03-24 NOTE — TELEPHONE ENCOUNTER
Reid Belt [birth year] [phone] Pt had sx on 3/22 and was just released from the hospital  She went to the pharmacy and said there was no medication there and she is in a lot of pain  It looks like it was sent but she said that they do not have her medication  It was the [facility] Rodriguez      Paged Dr Catracho Yeh via [facility]

## 2021-03-24 NOTE — DISCHARGE INSTRUCTIONS
Follow up: Follow up as scheduled with Dr Danita Allred in the office  Call the office if you have increased pain not relieved with pain medicine  Call the office if you have a fever,redness, the wound opens up, you have pus draining from your incision  AFTER YOU LEAVE: Following discharge from the hospital, you may have some questions about your procedure, your activities or your general condition  These instructions may answer some of your questions and help you adjust during the first few days following your operation  You can expect to be sore and tender mostly around the incisions  This pain should last approximally 5 days and gradually improve daily  Incisions:    - You may apply ice to the incisions to help with pain  Avoid heat as this may make the glue tacky   - It is normal to have some bruising, swelling or mild discoloration around the incision  If increasing redness or pain develops, call our office immediately  Do not apply any creams, lotions, or ointments  If you have dressings:  - You may remove the gauze dressing from your incisions 48 hours after surgery  Underneath this dressing is a tape like dressing called Steri Strips  Leave the steri strips in place for 5-7 days  They may fall off on their own  This is OK  If you have surgical adhesive glue:  - The incisions are closed with dissolvable sutures underneath the skin and a surgical adhesive overlying the skin  - Do not pick at the adhesive  It will naturally wear off with time    Bathing:   - You may shower daily with soap and water the day after the procedure  It is OK to GENTLY wash the incision with soap and water then pat dry  DO NOT SCRUB  Do NOT soak incision in a tub, pool, or hot tub for 2 weeks  Diet:   - Resume your normal diet unless specified otherwise  We recommend you slowly advance your diet  Try to start with softer bland foods and gradually advance as tolerated  Be sure to consume plenty of water  Avoid alcohol  Activity/Restrictions:   - The evening following the procedure you should rest as much as possible, sitting, lying or reclining  you should be sure someone remains with you until the next morning  Gradually increase your activity daily  Walking 3-4 times daily is good and stairs are ok  Listen to your body  If you start to get tired or sore then rest    - No strenuous activity or exercise for 3-4 weeks  - No heavy lifting, pushing or pulling    - No driving for 5 days or while taking narcotics for pain  Return or work: You may return to work or other activities as soon as your pain is controlled and you feel comfortable  For many people, this is 5 to 7 days after surgery  If your job requires heavy lifting you will need to be on light duty for 2-3 weeks  Medication:   - If you were given a prescription for Percocet, Norco, or Vicodin for pain be sure to eat prior to taking as these medications as they may cause nausea and vomiting on an empty stomach     - If you do not want to take stronger medications for pain, you may take Tylenol, Aleve OR Ibuprofen  - DO NOT take Tylenol  (acetaminophen) with these medication for a fever or for further pain control as these medications already contain Tylenol in them  Take one or the other  Do not exceed more than 4000 mg of acetaminophen in 24 hours or 3000 mg if you have liver disease  - If you were given an antibiotic take it until it is finished  Contact your healthcare provider if:   · You have a fever over 101°F (38°C) or chills  · You have pain or nausea that is not relieved by medicine  · You have redness and swelling around your incisions, or blood or pus is leaking             from your incisions  · You are constipated or have diarrhea  · Your skin or eyes are yellow, or your bowel movements are pale  · You have questions or concerns about your surgery, condition, or care    Seek care immediately or call 911 if:   · You cannot stop vomiting  · Your bowel movements are black or bloody  · You have pain in your abdomen and it is swollen or hard  · Your arm or leg feels warm, tender, and painful  It may look swollen and red  · You feel lightheaded, short of breath, and have chest pain  · You cough up blood  Incisional Hernia   WHAT YOU NEED TO KNOW:   An incisional hernia is a bulge through the healed incision of a previous surgery in your abdomen  An incisional hernia is usually caused by weakness in the tissues and muscles of your abdomen  The bulge is usually caused by a part of your intestine, but it may also be tissue or fat pushing through the weakness  DISCHARGE INSTRUCTIONS:   Call 911 for any of the following:   · You have sudden trouble breathing  Seek care immediately if:   · You have severe pain  · You have bloody bowel movements  · You stop having bowel movements or passing gas  · Your abdomen is suddenly very hard  Contact your healthcare provider if:   · You have a fever  · You have nausea and are vomiting  · You are constipated  · Your hernia has returned  · You have a lump, or collection of fluid, under your skin  · You have pain that does not go away, even after you take pain medicine  · You have questions or concerns about your condition or care  Medicines: You may need any of the following:  · NSAIDs , such as ibuprofen, help decrease swelling, pain, and fever  This medicine is available with or without a doctor's order  NSAIDs can cause stomach bleeding or kidney problems in certain people  If you take blood thinner medicine, always ask your healthcare provider if NSAIDs are safe for you  Always read the medicine label and follow directions  · Prescription pain medicine  may be given  Ask your how to take this medicine safely  · Take your medicine as directed    Contact your healthcare provider if you think your medicine is not helping or if you have side effects  Tell him or her if you are allergic to any medicine  Keep a list of the medicines, vitamins, and herbs you take  Include the amounts, and when and why you take them  Bring the list or the pill bottles to follow-up visits  Carry your medicine list with you in case of an emergency  Prevent another incisional hernia:   · Maintain a healthy weight  Ask your healthcare provider how much you should weigh  Ask him to help you create a weight loss plan if you are overweight  Ask your healthcare provider what types of food you should eat  · Do not strain  when you have a bowel movement  Take an over-the-counter bowel movement softener and drink plenty of water  When you cough, hold a pillow against your incision to prevent strain  · Do not smoke  If you smoke, it is never too late to quit  Ask for information if you need help quitting  · Wear your support device as directed  You may need to wear a support device, such as an abdominal binder for up to 2 weeks after surgery  This helps decrease pain and the risk of fluid collecting under your skin  · Return to your usual activities as directed  Do not lift more than 10 pounds or do strenuous activity for up to 6 weeks  Follow up with your healthcare provider as directed:  Write down your questions so you remember to ask them during your visits  © Copyright [address] Information is for End User's use only and may not be sold, redistributed or otherwise used for commercial purposes  All illustrations and images included in CareNotes® are the copyrighted property of A D A M , Inc  or [facility] Cinthya Celestin   The above information is an  only  It is not intended as medical advice for individual conditions or treatments  Talk to your doctor, nurse or pharmacist before following any medical regimen to see if it is safe and effective for you

## 2021-03-24 NOTE — ASSESSMENT & PLAN NOTE
· Management per surgery service  · Post op day 2   · Pain is adequately controlled   · Escalate diet as tolerated

## 2021-03-24 NOTE — PROGRESS NOTES
[facility]  Progress Note Nazia Guerrero 1975, 50 y o  female MRN: [id number]  Unit/Bed#: -01 Encounter: [id number]  Primary Care Provider: Pipe Corrigan MD   Date and time admitted to hospital: 3/22/2021  7:03 AM    Hypotension  Assessment & Plan  · Patient reports having low normal blood pressure at baseline  · Exacerbated by dilaudid  · Monitor blood pressure, now improving     * Ventral hernia  Assessment & Plan  · Management per surgery service  · Post op day 2   · Pain is adequately controlled   · Escalate diet as tolerated    Cigarette nicotine dependence without complication  Assessment & Plan  · Counseled on smoking cessation  · Continue nicotine patch     Obesity (BMI 30-39  9)  Assessment & Plan  · Counseled on lifestyle modifications   · Follow up with PCP    Generalized anxiety disorder  Assessment & Plan  · Controlled on Cymbalta, continue    MDD (major depressive disorder), recurrent, severe, with psychosis ([facility] [address] )  Assessment & Plan  · Controlled on cymbalta, continue        VTE Pharmacologic Prophylaxis:   Pharmacologic: Enoxaparin (Lovenox)  Mechanical VTE Prophylaxis in Place: Yes    Patient Centered Rounds: I have performed bedside rounds with nursing staff today  Discussions with Specialists or Other Care Team Provider: Surgery    Time Spent for Care: 30 minutes  More than 50% of total time spent on counseling and coordination of care as described above  Current Length of Stay: 2 day(s)    Current Patient Status: Inpatient   Certification Statement: The patient will continue to require additional inpatient hospital stay due to post operative care    Discharge Plan: per surgery    Code Status: Level 1 - Full Code      Subjective:   Patient feels well overall  Pain improving  Tolerating escalation of diet  No chest pain or chest palpitations  No shortness of breath       Objective:     Vitals:   Temp (24hrs), Av 8 °F (37 1 °C), Min:97 8 °F (36 6 °C), Max:99 8 °F (37 7 °C)    Temp:  [97 8 °F (36 6 °C)-99 8 °F (37 7 °C)] 99 8 °F (37 7 °C)  HR:  [74-82] 82  Resp:  [18] 18  BP: (104-116)/(63-75) 116/75  SpO2:  [94 %-96 %] 96 %  Body mass index is 33 45 kg/m²  Input and Output Summary (last 24 hours): Intake/Output Summary (Last 24 hours) at 3/24/2021 0817  Last data filed at 3/23/2021 1741  Gross per 24 hour   Intake 3463 33 ml   Output --   Net 3463 33 ml       Physical Exam:     Physical Exam  Vitals signs and nursing note reviewed  Constitutional:       Appearance: Normal appearance  HENT:      Head: Normocephalic and atraumatic  Nose: Nose normal  No congestion  Mouth/Throat:      Mouth: Mucous membranes are dry  Pharynx: Oropharynx is clear  Eyes:      General:         Right eye: No discharge  Left eye: No discharge  Neck:      Musculoskeletal: Normal range of motion and neck supple  Cardiovascular:      Rate and Rhythm: Normal rate and regular rhythm  Pulmonary:      Effort: Pulmonary effort is normal  No respiratory distress  Abdominal:      General: Abdomen is flat  Palpations: Abdomen is soft  Tenderness: There is abdominal tenderness  Musculoskeletal:      Right lower leg: No edema  Left lower leg: No edema  Skin:     General: Skin is warm and dry  Neurological:      General: No focal deficit present  Mental Status: She is alert  Mental status is at baseline     Psychiatric:         Mood and Affect: Mood normal          Behavior: Behavior normal            Additional Data:     Labs:    Results from last 7 days   Lab Units 03/24/21  0528   WBC Thousand/uL 8 41   HEMOGLOBIN g/dL 11 3*   HEMATOCRIT % 35 7   PLATELETS Thousands/uL 234   NEUTROS PCT % 63   LYMPHS PCT % 26   MONOS PCT % 7   EOS PCT % 3     Results from last 7 days   Lab Units 03/24/21  0528   SODIUM mmol/L 144   POTASSIUM mmol/L 3 9   CHLORIDE mmol/L 110*   CO2 mmol/L 26   BUN mg/dL 13   CREATININE mg/dL 0 85   ANION GAP mmol/L 8   CALCIUM mg/dL 8 5   ALBUMIN g/dL 2 9*   TOTAL BILIRUBIN mg/dL 0 23   ALK PHOS U/L 65   ALT U/L 13   AST U/L 11   GLUCOSE RANDOM mg/dL 91                           * I Have Reviewed All Lab Data Listed Above  * Additional Pertinent Lab Tests Reviewed: [address] Admission Reviewed    Imaging:    Imaging Reports Reviewed Today Include: na  Imaging Personally Reviewed by Myself Includes:  na    Recent Cultures (last 7 days):     Results from last 7 days   Lab Units 03/22/21  0937   GRAM STAIN RESULT  No Polys or Bacteria seen   WOUND CULTURE  No growth       Last 24 Hours Medication List:   Current Facility-Administered Medications   Medication Dose Route Frequency Provider Last Rate    acetaminophen  650 mg Oral Q6H [address] Shirlene Hubbard PA-C      clonazePAM  0 5 mg Oral TID [employer], PA-C      docusate sodium  100 mg Oral BID Shirlene Hubbard, PA-GURDEEP      DULoxetine  60 mg Oral Daily [employer], PA-C      enoxaparin  40 mg Subcutaneous Daily [employer], PA-C      ketorolac  15 mg Intravenous Q6H PRN [employer], PA-C      nicotine  1 patch Transdermal Daily OSMAR Sheth-GURDEEP      ondansetron  4 mg Intravenous Q6H PRN [employer], PA-C      oxyCODONE  10 mg Oral Q6H PRN [employer], PA-C      oxyCODONE  5 mg Oral Q4H PRN Shirlene Hubbard, PA-GURDEEP      topiramate  25 mg Oral Daily [employer], PA-C      topiramate  50 mg Oral Daily [employer], PA-C      traZODone  100 mg Oral HS PRN [employer], PA-C          Today, Patient Was Seen By: CHEYANNE Jay      ** Please Note: Dictation voice to text software may have been used in the creation of this document   **

## 2021-03-24 NOTE — ASSESSMENT & PLAN NOTE
· Patient reports having low normal blood pressure at baseline  · Exacerbated by dilaudid  · Monitor blood pressure, now improving

## 2021-03-24 NOTE — DISCHARGE SUMMARY
Discharge Summary - Stephanie Townsend 50 y o  female MRN: [id number]    Unit/Bed#: -01 Encounter: [id number]    Admission Date:   Admission Orders (From admission, onward)     Ordered        03/22/21 1058  Inpatient Admission  Once                     Admitting Diagnosis: Irreducible ventral incisional hernia [K43 0]    HPI:  Patient presented to [facility] on 03/22/2021 for elective open repair of ventral/incisional hernia with mesh  A physical exam was performed on day of discharge  Physical Exam: /75   Pulse 82   Temp 99 8 °F (37 7 °C)   Resp 18   Ht 5' 1" (1 549 m)   Wt 80 3 kg (177 lb 0 5 oz)   SpO2 96%   BMI 33 45 kg/m²   General appearance: alert and oriented, in no acute distress  Lungs: clear to auscultation bilaterally  Heart: regular rate and rhythm and S1, S2 normal  Abdomen: soft, non-tender; bowel sounds normal; no masses,  no organomegaly  Extremities: extremities normal, warm and well-perfused; no cyanosis, clubbing, or edema      Procedures Performed: No orders of the defined types were placed in this encounter  Summary of Hospital Course:  Patient presented to [facility] on 03/22/2021 for elective open repair of ventral/incisional hernia with mesh  The patient was able to tolerate the procedure well, and it was completed successfully without complications  Intraoperatively a rectus sheath mass was found which could represent calcified/necrotic soft tissue, abscess, or granuloma but it did not represent any malignancy  Patient was able to tolerate diet advancement without any increasing abdominal pain, nausea, vomiting  Patient was discharged on postop day 2, at which time her vitals, physical exam, and lab results were within normal limits  At time of discharge patient was having bowel function, tolerating diet, and pain was well controlled  Discharge instructions were provided to the patient in verbal and written form    All the patient's questions and concerns were answered addressed the patient's satisfaction  Patient was instructed to follow up with general surgeon in 2 weeks  A small amount of pain medication was sent electronically to the patient's pharmacy at time of discharge as per protocol  Significant Findings, Care, Treatment and Services Provided:  Rectus sheath mass/granuloma, infraumbilical incisional hernia repair with mesh    Complications:  None    Discharge Diagnosis:  Incarcerated ventral/incisional hernia    Resolved Problems  Date Reviewed: 3/23/2021    None          Condition at Discharge: good         Discharge instructions/Information to patient and family:   See after visit summary for information provided to patient and family  Provisions for Follow-Up Care:  See after visit summary for information related to follow-up care and any pertinent home health orders  PCP: Pipe Corrigan MD    Disposition: Home    Planned Readmission: No      Discharge Statement   I spent 30 plan of care and expectations minutes discharging the patient  This time was spent on the day of discharge  I had direct contact with the patient on the day of discharge  Additional documentation is required if more than 30 minutes were spent on discharge  Discharge Medications:  See after visit summary for reconciled discharge medications provided to patient and family

## 2021-03-25 LAB
BACTERIA SPEC ANAEROBE CULT: NO GROWTH
BACTERIA WND AEROBE CULT: NO GROWTH
GRAM STN SPEC: NORMAL

## 2021-03-25 NOTE — PROGRESS NOTES
1st attempt-LVM asking pt to return call for TCM documentation and TCM appointment        By Jonathan Dotson

## 2021-03-25 NOTE — TELEPHONE ENCOUNTER
I called the pt to verify if her medication was received  I kept getting VM  So I left detailed message and asked her to call me asap

## 2021-03-26 NOTE — OP NOTE
OPERATIVE REPORT  PATIENT NAME: Eli Hyatt    :  1975  MRN: [id number]  Pt Location: [facility] OR ROOM 02    SURGERY DATE: 3/22/2021    Surgeon(s) and Role:     * Isabel Cuevas MD - Primary     * Shirlene Hubbard PA-C - Assisting    Preop Diagnosis:  Irreducible ventral incisional hernia [K43 0]    Post-Op Diagnosis Codes:     * Irreducible ventral incisional hernia [K43 0]    Procedure(s) (LRB):  OPEN REPAIR VENTRAL/INCISIONAL HERNIA W/MESH (N/A)    Specimen(s):  ID Type Source Tests Collected by Time Destination   1 : rectus sheath mass Tissue Hernia Sac, Umbilical TISSUE EXAM Isabel Cuevas MD 3/22/2021 0831    2 : Incisional Hernia Sac Tissue Hernia Sac, Umbilical TISSUE EXAM Isabel Cuevas MD 3/22/2021 8577    3 : Rectus Sheath Mass Tissue Hernia Sac, Umbilical TISSUE EXAM Isabel Cuevas MD 3/22/2021 1000    A : peritoneum fluid Wound Peritoneum ANAEROBIC CULTURE AND GRAM STAIN, WOUND CULTURE Isabel Cuevas MD 3/22/2021 6414        Estimated Blood Loss:   50 mL    Drains:  [REMOVED] Urethral Catheter Straight-tip 16 Fr  (Removed)   Number of days: 0       Anesthesia Type:   General    Operative Indications:  Irreducible ventral incisional hernia [K43 0]  Possible cystic mass in the lower abdomen/pelvis    Operative Findings:  5 x 5 cm granuloma along the posterior rectus sheath with adherence to a loop of small bowel   3 x 4 cm incisional hernia defect containing fat/omentum  Complications:   None    Procedure and Technique:    The patient was seen in preop holding and the procedure reviewed  The patient was then brought to the operating room and placed in supine position  The patient was intubated and sedated  The abdomen was prepped and draped  A preincision time out confirmed the patient, procedure and preop antibiotics  A 4 cm incision was then made about assisted between the umbilicus and symphysis pubis in the area of the imaged hernia   The dissection was carried down using the bovey through the subcutaneous tissue and the hernia sac was encountered which contained omentum  This was dissected circumferentially thus entering the peritoneal cavity  Upon palpation of the peritoneal surface a firm mass was palpated along the peritoneum and posterior rectus sheath  It measured about 5 x 5  An incisional biopsy was performed and sent for frozen section  It appeared to be white fibrous tissue that was encapsulated between the two layers  There was no fluid or odor  There was a single loop of bowel adherent to the peritoneum lining this mass which was taken down with a small deserosalization which was imbricated using interrupted 3-0 vicryl  The frozen section was most consistent with fat necrosis or granuloma without concern for malignancy  This was removed amost in its entirety without taking down additional bowel or the abutting bladder  however I elected to not place an intraabdominal mesh against the lining of this mass and given the defect was relatively small elected to place an onlay after raising subcutaneous flaps above the anterior rectus sheath and a 10 x 12 cm polypropylene mesh was placed using 0 neurolon after closing the midline with running 1 PDS  The skin was closed with staples      I was present for the entire procedure, A qualified resident physician was not available and A physician assistant was required during the procedure for retraction tissue handling,dissection and suturing    Patient Disposition:  PACU  and extubated and stable    SIGNATURE: Edward Machado MD  DATE: March 25, 2021  TIME: 9:43 PM

## 2021-04-05 ENCOUNTER — OFFICE VISIT (OUTPATIENT)
Dept: SURGERY | Facility: CLINIC | Age: 46
End: 2021-04-05

## 2021-04-05 VITALS
HEIGHT: 61 IN | SYSTOLIC BLOOD PRESSURE: 102 MMHG | DIASTOLIC BLOOD PRESSURE: 68 MMHG | HEART RATE: 82 BPM | TEMPERATURE: 97 F | WEIGHT: 178.4 LBS | BODY MASS INDEX: 33.68 KG/M2

## 2021-04-05 DIAGNOSIS — Z98.890 S/P REPAIR OF VENTRAL HERNIA: Primary | ICD-10-CM

## 2021-04-05 DIAGNOSIS — Z87.19 S/P REPAIR OF VENTRAL HERNIA: Primary | ICD-10-CM

## 2021-04-05 PROCEDURE — 99024 POSTOP FOLLOW-UP VISIT: CPT | Performed by: SURGERY

## 2021-04-05 NOTE — PROGRESS NOTES
Assessment/Plan:    Pathology Results:  Final Diagnosis   A and C  Rectus sheath mass (excision): - Infarcted adipose and fibrous tissue with focal calcification and hemosiderin deposition (see comment)        Comment:  The findings may represent fat necrosis in the appropriate clinical context      B  Incisional hernia sac (repair): - Benign mesothelial-lined fibroconnective tissue and adipose  1  S/P repair of ventral hernia        Followup in 2 weeks for recheck and likely clearance back to work  Subjective: Ventral hernia     Patient ID: Prateek Kulkarni is a 50 y o  female  Triage Notes:    Ms Rosie Black is following up after open ventral hernia repair with mesh on 3/22  She was hospitalized 3/22 - 3/24 for recovery  She reports doing relatively well  She has some ongoing soreness  She is eating and having bowel movements and flatus  No nausea/vomiting/erythema/fevers/chills/discharge  The following portions of the patient's history were reviewed and updated as appropriate: allergies, current medications, past family history, past medical history, past social history, past surgical history and problem list     Review of Systems      Objective:      /68   Pulse 82   Temp (!) 97 °F (36 1 °C) (Temporal)   Ht 5' 1" (1 549 m)   Wt 80 9 kg (178 lb 6 4 oz)   Breastfeeding No   BMI 33 71 kg/m²     Below is the patient's most recent value for Albumin, ALT, AST, BUN, Calcium, Chloride, Cholesterol, CO2, Creatinine, GFR, Glucose, HDL, Hematocrit, Hemoglobin, Hemoglobin A1C, LDL, Magnesium, Phosphorus, Platelets, Potassium, PSA, Sodium, Triglycerides, and WBC     Lab Results   Component Value Date    ALT 13 03/24/2021    AST 11 03/24/2021    BUN 13 03/24/2021    CALCIUM 8 5 03/24/2021     (H) 03/24/2021    CO2 26 03/24/2021    CREATININE 0 85 03/24/2021    HDL 54 02/25/2021    HCT 35 7 03/24/2021    HGB 11 3 (L) 03/24/2021    MG 1 9 03/24/2021     03/24/2021    K 3 9 03/24/2021    TRIG 135 02/25/2021    WBC 8 41 03/24/2021     Note: for a comprehensive list of the patient's lab results, access the Results Review activity  Physical Exam  Vitals signs and nursing note reviewed  Constitutional:       General: She is not in acute distress  Appearance: She is well-developed  She is not diaphoretic  HENT:      Head: Normocephalic and atraumatic  Eyes:      Pupils: Pupils are equal, round, and reactive to light  Neck:      Musculoskeletal: Normal range of motion and neck supple  Cardiovascular:      Rate and Rhythm: Normal rate  Pulmonary:      Effort: Pulmonary effort is normal  No respiratory distress  Abdominal:      General: There is no distension  Palpations: Abdomen is soft  Comments: Midline incision is clean and dry  No erythema or drainage  Musculoskeletal: Normal range of motion  Skin:     General: Skin is warm and dry  Neurological:      Mental Status: She is alert and oriented to person, place, and time               Procedures

## 2021-04-05 NOTE — PATIENT INSTRUCTIONS
Follow up in 2 weeks  Anticipated return to work in about two weeks  Continue lifting restriction of 20 pounds

## 2021-04-20 ENCOUNTER — OFFICE VISIT (OUTPATIENT)
Dept: SURGERY | Facility: CLINIC | Age: 46
End: 2021-04-20

## 2021-04-20 VITALS
TEMPERATURE: 97.5 F | WEIGHT: 179.2 LBS | HEART RATE: 92 BPM | DIASTOLIC BLOOD PRESSURE: 70 MMHG | SYSTOLIC BLOOD PRESSURE: 112 MMHG | HEIGHT: 61 IN | BODY MASS INDEX: 33.83 KG/M2

## 2021-04-20 DIAGNOSIS — Z98.890 S/P REPAIR OF VENTRAL HERNIA: ICD-10-CM

## 2021-04-20 DIAGNOSIS — R10.13 DYSPEPSIA: Primary | ICD-10-CM

## 2021-04-20 DIAGNOSIS — Z87.19 S/P REPAIR OF VENTRAL HERNIA: ICD-10-CM

## 2021-04-20 PROCEDURE — 99024 POSTOP FOLLOW-UP VISIT: CPT | Performed by: SURGERY

## 2021-04-20 RX ORDER — FAMOTIDINE 20 MG/1
20 TABLET, FILM COATED ORAL DAILY
Qty: 30 TABLET | Refills: 1 | Status: SHIPPED | OUTPATIENT
Start: 2021-04-20 | End: 2022-03-07 | Stop reason: ALTCHOICE

## 2021-04-20 NOTE — PROGRESS NOTES
Assessment/Plan:       1  Dyspepsia  -     famotidine (PEPCID) 20 mg tablet; Take 1 tablet (20 mg total) by mouth daily If no improvement may increase dose to twice daily  2  S/P repair of ventral hernia      Ok to return to work with lifting restriction of 30 pounds  F/u in 2 weeks  Subjective: ventral hernia     Patient ID: Salina Snow is a 50 y o  female  Triage Notes:    Ms Dileep Alves is following up  She feels pretty well but having some nausea and regurgitation/reflux where with an acidic taste  She is not having any other issues with eating/drinking/bathroom  Appetite and energy have improved  The following portions of the patient's history were reviewed and updated as appropriate: allergies, current medications, past family history, past medical history, past social history, past surgical history and problem list     Review of Systems      Objective:      /70   Pulse 92   Temp 97 5 °F (36 4 °C) (Temporal)   Ht 5' 1" (1 549 m)   Wt 81 3 kg (179 lb 3 2 oz)   Breastfeeding No   BMI 33 86 kg/m²     Below is the patient's most recent value for Albumin, ALT, AST, BUN, Calcium, Chloride, Cholesterol, CO2, Creatinine, GFR, Glucose, HDL, Hematocrit, Hemoglobin, Hemoglobin A1C, LDL, Magnesium, Phosphorus, Platelets, Potassium, PSA, Sodium, Triglycerides, and WBC  Lab Results   Component Value Date    ALT 13 03/24/2021    AST 11 03/24/2021    BUN 13 03/24/2021    CALCIUM 8 5 03/24/2021     (H) 03/24/2021    CO2 26 03/24/2021    CREATININE 0 85 03/24/2021    HDL 54 02/25/2021    HCT 35 7 03/24/2021    HGB 11 3 (L) 03/24/2021    MG 1 9 03/24/2021     03/24/2021    K 3 9 03/24/2021    TRIG 135 02/25/2021    WBC 8 41 03/24/2021     Note: for a comprehensive list of the patient's lab results, access the Results Review activity  Physical Exam  Vitals signs and nursing note reviewed  Constitutional:       General: She is not in acute distress       Appearance: Normal appearance  She is well-developed  She is not ill-appearing, toxic-appearing or diaphoretic  HENT:      Head: Normocephalic and atraumatic  Eyes:      Pupils: Pupils are equal, round, and reactive to light  Neck:      Musculoskeletal: Normal range of motion and neck supple  Cardiovascular:      Rate and Rhythm: Normal rate and regular rhythm  Pulmonary:      Effort: Pulmonary effort is normal    Abdominal:      General: There is no distension  Palpations: Abdomen is soft  Tenderness: There is no abdominal tenderness  There is no guarding  Hernia: No hernia is present  Comments: Lower midline incision is clean and dry  Well healing  No erythema or induration  Musculoskeletal: Normal range of motion  Skin:     General: Skin is warm and dry  Neurological:      Mental Status: She is alert and oriented to person, place, and time  Psychiatric:         Mood and Affect: Mood normal          Behavior: Behavior normal          Thought Content:  Thought content normal          Judgment: Judgment normal              Procedures

## 2021-04-21 ENCOUNTER — TELEPHONE (OUTPATIENT)
Dept: INTERNAL MEDICINE CLINIC | Facility: CLINIC | Age: 46
End: 2021-04-21

## 2021-04-21 DIAGNOSIS — G89.4 CHRONIC PAIN SYNDROME: Primary | ICD-10-CM

## 2021-04-21 NOTE — TELEPHONE ENCOUNTER
Patient states she had received a referral to Pain Management and made an appointment and the doctor said that they would not be able to help her      Patient needs a different referral to Pain Management

## 2021-04-21 NOTE — TELEPHONE ENCOUNTER
I put an order for pain management    She can find out another doctor in the area and take the referral with her

## 2021-04-23 ENCOUNTER — TELEPHONE (OUTPATIENT)
Dept: INTERNAL MEDICINE CLINIC | Facility: CLINIC | Age: 46
End: 2021-04-23

## 2021-04-23 NOTE — TELEPHONE ENCOUNTER
Please inform the patient that the pain management doctor from [facility] is not accepting her  Jose Harris tell her to call her insurance to see who all are covered in the area and she can make an appointment with them  Per dr Aubrey Dowling

## 2021-05-17 ENCOUNTER — OFFICE VISIT (OUTPATIENT)
Dept: SURGERY | Facility: CLINIC | Age: 46
End: 2021-05-17

## 2021-05-17 VITALS
SYSTOLIC BLOOD PRESSURE: 108 MMHG | WEIGHT: 179.2 LBS | DIASTOLIC BLOOD PRESSURE: 70 MMHG | TEMPERATURE: 97.9 F | HEART RATE: 83 BPM | BODY MASS INDEX: 33.83 KG/M2 | HEIGHT: 61 IN

## 2021-05-17 DIAGNOSIS — Z98.890 S/P REPAIR OF VENTRAL HERNIA: Primary | ICD-10-CM

## 2021-05-17 DIAGNOSIS — Z87.19 S/P REPAIR OF VENTRAL HERNIA: Primary | ICD-10-CM

## 2021-05-17 PROCEDURE — 99024 POSTOP FOLLOW-UP VISIT: CPT | Performed by: SURGERY

## 2021-05-17 NOTE — PROGRESS NOTES
Assessment/Plan:       1  S/P repair of ventral hernia      Doing well  No evidence of recurrence  F/u prn  Subjective: Ventral hernia     Patient ID: Niall Correa is a 50 y o  female  Triage Notes:    Ms Hu Llanes is following up  She reports feeling well  Has returned to work  No complaints/concerns  The following portions of the patient's history were reviewed and updated as appropriate: allergies, current medications, past family history, past medical history, past social history, past surgical history and problem list     Review of Systems      Objective:      /70   Pulse 83   Temp 97 9 °F (36 6 °C) (Temporal)   Ht 5' 1" (1 549 m)   Wt 81 3 kg (179 lb 3 2 oz)   Breastfeeding No   BMI 33 86 kg/m²     Below is the patient's most recent value for Albumin, ALT, AST, BUN, Calcium, Chloride, Cholesterol, CO2, Creatinine, GFR, Glucose, HDL, Hematocrit, Hemoglobin, Hemoglobin A1C, LDL, Magnesium, Phosphorus, Platelets, Potassium, PSA, Sodium, Triglycerides, and WBC  Lab Results   Component Value Date    ALT 13 03/24/2021    AST 11 03/24/2021    BUN 13 03/24/2021    CALCIUM 8 5 03/24/2021     (H) 03/24/2021    CO2 26 03/24/2021    CREATININE 0 85 03/24/2021    HDL 54 02/25/2021    HCT 35 7 03/24/2021    HGB 11 3 (L) 03/24/2021    MG 1 9 03/24/2021     03/24/2021    K 3 9 03/24/2021    TRIG 135 02/25/2021    WBC 8 41 03/24/2021     Note: for a comprehensive list of the patient's lab results, access the Results Review activity  Physical Exam  Vitals signs and nursing note reviewed  Constitutional:       General: She is not in acute distress  Appearance: Normal appearance  She is well-developed  She is not ill-appearing, toxic-appearing or diaphoretic  HENT:      Head: Normocephalic and atraumatic  Eyes:      Pupils: Pupils are equal, round, and reactive to light  Neck:      Musculoskeletal: Normal range of motion and neck supple     Cardiovascular:      Rate and Rhythm: Normal rate and regular rhythm  Pulmonary:      Effort: Pulmonary effort is normal    Abdominal:      General: There is no distension  Palpations: Abdomen is soft  Tenderness: There is no abdominal tenderness  There is no guarding  Hernia: No hernia is present  Comments: Lower midline incision is clean and dry  Well healed  No erythema or induration  Musculoskeletal: Normal range of motion  Skin:     General: Skin is warm and dry  Neurological:      Mental Status: She is alert and oriented to person, place, and time  Psychiatric:         Mood and Affect: Mood normal          Behavior: Behavior normal          Thought Content:  Thought content normal          Judgment: Judgment normal              Procedures

## 2021-07-06 ENCOUNTER — OFFICE VISIT (OUTPATIENT)
Dept: INTERNAL MEDICINE CLINIC | Facility: CLINIC | Age: 46
End: 2021-07-06
Payer: COMMERCIAL

## 2021-07-06 VITALS
BODY MASS INDEX: 34.59 KG/M2 | WEIGHT: 183.2 LBS | OXYGEN SATURATION: 96 % | HEIGHT: 61 IN | SYSTOLIC BLOOD PRESSURE: 116 MMHG | TEMPERATURE: 97.9 F | DIASTOLIC BLOOD PRESSURE: 72 MMHG | HEART RATE: 85 BPM

## 2021-07-06 DIAGNOSIS — G89.29 CHRONIC BILATERAL THORACIC BACK PAIN: ICD-10-CM

## 2021-07-06 DIAGNOSIS — M54.6 CHRONIC BILATERAL THORACIC BACK PAIN: ICD-10-CM

## 2021-07-06 DIAGNOSIS — R05.9 COUGH: Primary | ICD-10-CM

## 2021-07-06 DIAGNOSIS — F17.200 TOBACCO USE DISORDER: ICD-10-CM

## 2021-07-06 PROCEDURE — 99214 OFFICE O/P EST MOD 30 MIN: CPT | Performed by: FAMILY MEDICINE

## 2021-07-06 RX ORDER — BENZONATATE 200 MG/1
200 CAPSULE ORAL 3 TIMES DAILY PRN
Qty: 20 CAPSULE | Refills: 0 | Status: SHIPPED | OUTPATIENT
Start: 2021-07-06 | End: 2022-03-07 | Stop reason: ALTCHOICE

## 2021-07-06 RX ORDER — METHYLPREDNISOLONE 4 MG/1
TABLET ORAL
Qty: 21 EACH | Refills: 0 | Status: SHIPPED | OUTPATIENT
Start: 2021-07-08 | End: 2022-03-07 | Stop reason: ALTCHOICE

## 2021-07-06 NOTE — PATIENT INSTRUCTIONS
Pickup steroid pack Thursday if the pain medicine docotor doesn't do injection in your back  Get your chest x ray done as soon as possible  [facility]   [address] [facility]    [address] [place], [place] to

## 2021-07-06 NOTE — PROGRESS NOTES
FOLLOW-UP OFFICE VISIT  [facility]    NAME: Danetta Krabbe  AGE: 50 y o  SEX: female  : 1975     DATE: 2021     Assessment and Plan:     Problem List Items Addressed This Visit     None      Visit Diagnoses     Cough    -  Primary    Relevant Medications    benzonatate (TESSALON) 200 MG capsule    Other Relevant Orders    XR chest pa & lateral    Complete PFT with post bronchodilator    Chronic bilateral thoracic back pain        Relevant Medications    methylPREDNISolone 4 MG tablet therapy pack    Other Relevant Orders    XR chest pa & lateral    Complete PFT with post bronchodilator    Tobacco use disorder            Plan: back pain and cough chronic  Will start work up for cough noted above  likely early COPD  Steroids for back pain  Pt to follow up with pain management tomorrow  No follow-ups on file  Chief Complaint:     Chief Complaint   Patient presents with    Establish Care        History of Present Illness:   51 yo  New pt  Reports hx of anxiety and Josselin Rolling presents to establish care  Today she C/o of mid-back pain  Onset x months  aggravated by laying down on back or really deep breath  Denies fever, chills, lightheadedness or dizziness  Is a current every day smoker x 2 years  Quarter of a pack per day  Has been coughing more lately  Unproductive  Denies hemoptysis  Follow with pian management tomorrow  Review of Systems:     Review of Systems   Constitutional: Positive for fatigue  Negative for fever  Respiratory: Positive for cough and shortness of breath (with exertion )  Cardiovascular: Negative for chest pain  Musculoskeletal: Positive for arthralgias and back pain  Negative for gait problem  Neurological: Positive for numbness          Problem List:     Patient Active Problem List   Diagnosis    Incisional hernia, without obstruction or gangrene    Fibromyalgia    Numbness    MDD (major depressive disorder), recurrent, severe, with psychosis ([address] )    Somatoform disorder, unspecified    Disc degeneration, lumbar    Constipation    Arthritis    Generalized anxiety disorder    Vitamin D deficiency    Obesity (BMI 30-39  9)    Cigarette nicotine dependence without complication    Ventral hernia    Hypotension    Chronic pain syndrome        Objective: There were no vitals taken for this visit  Physical Exam  Vitals and nursing note reviewed  Constitutional:       General: She is not in acute distress  Appearance: She is well-developed  HENT:      Head: Normocephalic and atraumatic  Eyes:      Conjunctiva/sclera: Conjunctivae normal    Cardiovascular:      Rate and Rhythm: Normal rate and regular rhythm  Heart sounds: No murmur heard  Pulmonary:      Effort: Pulmonary effort is normal  No respiratory distress  Breath sounds: Normal breath sounds  Abdominal:      Palpations: Abdomen is soft  Tenderness: There is no abdominal tenderness  Musculoskeletal:      Thoracic back: Spasms present  Normal range of motion  Skin:     General: Skin is warm and dry  Neurological:      Mental Status: She is alert                 Aylin YORK [facility]  7/6/2021 5:05 PM

## 2021-07-09 ENCOUNTER — HOSPITAL ENCOUNTER (OUTPATIENT)
Dept: RADIOLOGY | Facility: HOSPITAL | Age: 46
Discharge: HOME | End: 2021-07-09
Payer: COMMERCIAL

## 2021-07-09 DIAGNOSIS — M54.6 CHRONIC BILATERAL THORACIC BACK PAIN: ICD-10-CM

## 2021-07-09 DIAGNOSIS — G89.29 CHRONIC BILATERAL THORACIC BACK PAIN: ICD-10-CM

## 2021-07-09 DIAGNOSIS — R05.9 COUGH: ICD-10-CM

## 2021-07-09 PROCEDURE — 71046 X-RAY EXAM CHEST 2 VIEWS: CPT

## 2021-07-17 ENCOUNTER — TELEPHONE (OUTPATIENT)
Dept: INTERNAL MEDICINE CLINIC | Facility: CLINIC | Age: 46
End: 2021-07-17

## 2021-07-17 NOTE — TELEPHONE ENCOUNTER
----- Message from Poornima Vasquez DO sent at 7/17/2021 10:30 AM EDT -----  Please notify pt of negative chest x ray  She is to complete the lung function testing when possible

## 2021-07-27 ENCOUNTER — TELEPHONE (OUTPATIENT)
Dept: UROLOGY | Facility: AMBULATORY SURGERY CENTER | Age: 46
End: 2021-07-27

## 2021-07-27 NOTE — TELEPHONE ENCOUNTER
Faxed records request from [facility] Records & Reporting to [facility] on 7-27-21  Re-faxed records request from [facility] to [facility] on 8-3-21   (Received request again on 7-31-21)

## 2021-11-02 NOTE — TELEPHONE ENCOUNTER
Vito from [facility] is calling to say she has not received records   Resending request

## 2021-11-04 ENCOUNTER — HOSPITAL ENCOUNTER (EMERGENCY)
Facility: HOSPITAL | Age: 46
Discharge: HOME | End: 2021-11-04
Attending: EMERGENCY MEDICINE | Admitting: EMERGENCY MEDICINE
Payer: COMMERCIAL

## 2021-11-04 ENCOUNTER — APPOINTMENT (EMERGENCY)
Dept: CT IMAGING | Facility: HOSPITAL | Age: 46
End: 2021-11-04
Payer: COMMERCIAL

## 2021-11-04 VITALS
SYSTOLIC BLOOD PRESSURE: 114 MMHG | DIASTOLIC BLOOD PRESSURE: 73 MMHG | RESPIRATION RATE: 16 BRPM | OXYGEN SATURATION: 96 % | HEART RATE: 76 BPM | TEMPERATURE: 97.9 F

## 2021-11-04 DIAGNOSIS — A08.4 VIRAL ENTERITIS: ICD-10-CM

## 2021-11-04 DIAGNOSIS — R10.9 ABDOMINAL PAIN: ICD-10-CM

## 2021-11-04 DIAGNOSIS — K43.2 INCISIONAL HERNIA, WITHOUT OBSTRUCTION OR GANGRENE: Primary | ICD-10-CM

## 2021-11-04 LAB
ALBUMIN SERPL BCP-MCNC: 4.3 G/DL (ref 3.5–5)
ALP SERPL-CCNC: 96 U/L (ref 46–116)
ALT SERPL W P-5'-P-CCNC: 17 U/L (ref 12–78)
ANION GAP SERPL CALCULATED.3IONS-SCNC: 10 MMOL/L (ref 4–13)
AST SERPL W P-5'-P-CCNC: 15 U/L (ref 5–45)
BASOPHILS # BLD AUTO: 0.06 THOUSANDS/ΜL (ref 0–0.1)
BASOPHILS NFR BLD AUTO: 1 % (ref 0–1)
BILIRUB SERPL-MCNC: 0.36 MG/DL (ref 0.2–1)
BILIRUB UR QL STRIP: NEGATIVE
BUN SERPL-MCNC: 15 MG/DL (ref 5–25)
CALCIUM SERPL-MCNC: 9 MG/DL (ref 8.3–10.1)
CHLORIDE SERPL-SCNC: 104 MMOL/L (ref 100–108)
CLARITY UR: CLEAR
CO2 SERPL-SCNC: 28 MMOL/L (ref 21–32)
COLOR UR: YELLOW
CREAT SERPL-MCNC: 1.11 MG/DL (ref 0.6–1.3)
EOSINOPHIL # BLD AUTO: 0.12 THOUSAND/ΜL (ref 0–0.61)
EOSINOPHIL NFR BLD AUTO: 1 % (ref 0–6)
ERYTHROCYTE [DISTWIDTH] IN BLOOD BY AUTOMATED COUNT: 14.2 % (ref 11.6–15.1)
GFR SERPL CREATININE-BSD FRML MDRD: 59 ML/MIN/1.73SQ M
GLUCOSE SERPL-MCNC: 90 MG/DL (ref 65–140)
GLUCOSE UR STRIP-MCNC: NEGATIVE MG/DL
HCT VFR BLD AUTO: 47.4 % (ref 34.8–46.1)
HGB BLD-MCNC: 14.9 G/DL (ref 11.5–15.4)
HGB UR QL STRIP.AUTO: NEGATIVE
IMM GRANULOCYTES # BLD AUTO: 0.03 THOUSAND/UL (ref 0–0.2)
IMM GRANULOCYTES NFR BLD AUTO: 0 % (ref 0–2)
KETONES UR STRIP-MCNC: NEGATIVE MG/DL
LACTATE SERPL-SCNC: 0.9 MMOL/L (ref 0.5–2)
LEUKOCYTE ESTERASE UR QL STRIP: NEGATIVE
LIPASE SERPL-CCNC: 132 U/L (ref 73–393)
LYMPHOCYTES # BLD AUTO: 2.47 THOUSANDS/ΜL (ref 0.6–4.47)
LYMPHOCYTES NFR BLD AUTO: 27 % (ref 14–44)
MCH RBC QN AUTO: 29 PG (ref 26.8–34.3)
MCHC RBC AUTO-ENTMCNC: 31.4 G/DL (ref 31.4–37.4)
MCV RBC AUTO: 92 FL (ref 82–98)
MONOCYTES # BLD AUTO: 0.52 THOUSAND/ΜL (ref 0.17–1.22)
MONOCYTES NFR BLD AUTO: 6 % (ref 4–12)
NEUTROPHILS # BLD AUTO: 5.84 THOUSANDS/ΜL (ref 1.85–7.62)
NEUTS SEG NFR BLD AUTO: 65 % (ref 43–75)
NITRITE UR QL STRIP: NEGATIVE
NRBC BLD AUTO-RTO: 0 /100 WBCS
PH UR STRIP.AUTO: 6 [PH]
PLATELET # BLD AUTO: 299 THOUSANDS/UL (ref 149–390)
PMV BLD AUTO: 11 FL (ref 8.9–12.7)
POTASSIUM SERPL-SCNC: 3.9 MMOL/L (ref 3.5–5.3)
PROT SERPL-MCNC: 8.4 G/DL (ref 6.4–8.2)
PROT UR STRIP-MCNC: NEGATIVE MG/DL
RBC # BLD AUTO: 5.13 MILLION/UL (ref 3.81–5.12)
SODIUM SERPL-SCNC: 142 MMOL/L (ref 136–145)
SP GR UR STRIP.AUTO: 1.02 (ref 1–1.03)
UROBILINOGEN UR QL STRIP.AUTO: 0.2 E.U./DL
WBC # BLD AUTO: 9.04 THOUSAND/UL (ref 4.31–10.16)

## 2021-11-04 PROCEDURE — 99285 EMERGENCY DEPT VISIT HI MDM: CPT | Performed by: EMERGENCY MEDICINE

## 2021-11-04 PROCEDURE — 83605 ASSAY OF LACTIC ACID: CPT | Performed by: EMERGENCY MEDICINE

## 2021-11-04 PROCEDURE — 96376 TX/PRO/DX INJ SAME DRUG ADON: CPT

## 2021-11-04 PROCEDURE — 87040 BLOOD CULTURE FOR BACTERIA: CPT | Performed by: EMERGENCY MEDICINE

## 2021-11-04 PROCEDURE — 85025 COMPLETE CBC W/AUTO DIFF WBC: CPT | Performed by: EMERGENCY MEDICINE

## 2021-11-04 PROCEDURE — 96375 TX/PRO/DX INJ NEW DRUG ADDON: CPT

## 2021-11-04 PROCEDURE — 83690 ASSAY OF LIPASE: CPT | Performed by: EMERGENCY MEDICINE

## 2021-11-04 PROCEDURE — 99284 EMERGENCY DEPT VISIT MOD MDM: CPT

## 2021-11-04 PROCEDURE — 96374 THER/PROPH/DIAG INJ IV PUSH: CPT

## 2021-11-04 PROCEDURE — 81003 URINALYSIS AUTO W/O SCOPE: CPT | Performed by: EMERGENCY MEDICINE

## 2021-11-04 PROCEDURE — 36415 COLL VENOUS BLD VENIPUNCTURE: CPT | Performed by: EMERGENCY MEDICINE

## 2021-11-04 PROCEDURE — 80053 COMPREHEN METABOLIC PANEL: CPT | Performed by: EMERGENCY MEDICINE

## 2021-11-04 PROCEDURE — 74177 CT ABD & PELVIS W/CONTRAST: CPT

## 2021-11-04 PROCEDURE — 96361 HYDRATE IV INFUSION ADD-ON: CPT

## 2021-11-04 RX ORDER — HYDROMORPHONE HCL/PF 1 MG/ML
1 SYRINGE (ML) INJECTION ONCE
Status: COMPLETED | OUTPATIENT
Start: 2021-11-04 | End: 2021-11-04

## 2021-11-04 RX ORDER — DICYCLOMINE HCL 20 MG
20 TABLET ORAL EVERY 6 HOURS
Qty: 30 TABLET | Refills: 0 | Status: SHIPPED | OUTPATIENT
Start: 2021-11-04 | End: 2022-03-07 | Stop reason: ALTCHOICE

## 2021-11-04 RX ORDER — MORPHINE SULFATE 4 MG/ML
4 INJECTION, SOLUTION INTRAMUSCULAR; INTRAVENOUS ONCE
Status: COMPLETED | OUTPATIENT
Start: 2021-11-04 | End: 2021-11-04

## 2021-11-04 RX ORDER — ONDANSETRON 2 MG/ML
4 INJECTION INTRAMUSCULAR; INTRAVENOUS ONCE
Status: COMPLETED | OUTPATIENT
Start: 2021-11-04 | End: 2021-11-04

## 2021-11-04 RX ORDER — ONDANSETRON 4 MG/1
4 TABLET, ORALLY DISINTEGRATING ORAL EVERY 6 HOURS PRN
Qty: 20 TABLET | Refills: 0 | Status: SHIPPED | OUTPATIENT
Start: 2021-11-04 | End: 2022-03-07 | Stop reason: ALTCHOICE

## 2021-11-04 RX ORDER — OXYCODONE HYDROCHLORIDE AND ACETAMINOPHEN 5; 325 MG/1; MG/1
1 TABLET ORAL EVERY 4 HOURS PRN
Qty: 20 TABLET | Refills: 0 | Status: SHIPPED | OUTPATIENT
Start: 2021-11-04 | End: 2022-03-07 | Stop reason: ALTCHOICE

## 2021-11-04 RX ADMIN — ONDANSETRON 4 MG: 2 INJECTION INTRAMUSCULAR; INTRAVENOUS at 12:15

## 2021-11-04 RX ADMIN — HYDROMORPHONE HYDROCHLORIDE 1 MG: 1 INJECTION, SOLUTION INTRAMUSCULAR; INTRAVENOUS; SUBCUTANEOUS at 12:15

## 2021-11-04 RX ADMIN — SODIUM CHLORIDE 1000 ML: 0.9 INJECTION, SOLUTION INTRAVENOUS at 10:40

## 2021-11-04 RX ADMIN — MORPHINE SULFATE 4 MG: 4 INJECTION INTRAVENOUS at 10:40

## 2021-11-04 RX ADMIN — IOHEXOL 100 ML: 350 INJECTION, SOLUTION INTRAVENOUS at 11:26

## 2021-11-04 RX ADMIN — ONDANSETRON 4 MG: 2 INJECTION INTRAMUSCULAR; INTRAVENOUS at 10:40

## 2021-11-04 RX ADMIN — HYDROMORPHONE HYDROCHLORIDE 1 MG: 1 INJECTION, SOLUTION INTRAMUSCULAR; INTRAVENOUS; SUBCUTANEOUS at 14:28

## 2021-11-09 LAB
BACTERIA BLD CULT: NORMAL
BACTERIA BLD CULT: NORMAL

## 2021-12-02 ENCOUNTER — HOSPITAL ENCOUNTER (OUTPATIENT)
Dept: PULMONOLOGY | Facility: HOSPITAL | Age: 46
Discharge: HOME | End: 2021-12-02
Payer: COMMERCIAL

## 2021-12-02 DIAGNOSIS — R05.9 COUGH: ICD-10-CM

## 2021-12-02 DIAGNOSIS — G89.29 CHRONIC BILATERAL THORACIC BACK PAIN: ICD-10-CM

## 2021-12-02 DIAGNOSIS — M54.6 CHRONIC BILATERAL THORACIC BACK PAIN: ICD-10-CM

## 2021-12-02 PROCEDURE — 94060 EVALUATION OF WHEEZING: CPT

## 2021-12-02 PROCEDURE — 94760 N-INVAS EAR/PLS OXIMETRY 1: CPT

## 2021-12-02 PROCEDURE — 94729 DIFFUSING CAPACITY: CPT | Performed by: INTERNAL MEDICINE

## 2021-12-02 PROCEDURE — 94727 GAS DIL/WSHOT DETER LNG VOL: CPT

## 2021-12-02 PROCEDURE — 94727 GAS DIL/WSHOT DETER LNG VOL: CPT | Performed by: INTERNAL MEDICINE

## 2021-12-02 PROCEDURE — 94060 EVALUATION OF WHEEZING: CPT | Performed by: INTERNAL MEDICINE

## 2021-12-02 PROCEDURE — 94729 DIFFUSING CAPACITY: CPT

## 2021-12-02 RX ORDER — ALBUTEROL SULFATE 2.5 MG/3ML
2.5 SOLUTION RESPIRATORY (INHALATION) ONCE
Status: COMPLETED | OUTPATIENT
Start: 2021-12-02 | End: 2021-12-02

## 2021-12-02 RX ADMIN — ALBUTEROL SULFATE 2.5 MG: 2.5 SOLUTION RESPIRATORY (INHALATION) at 17:04

## 2021-12-06 ENCOUNTER — TELEPHONE (OUTPATIENT)
Dept: INTERNAL MEDICINE CLINIC | Facility: CLINIC | Age: 46
End: 2021-12-06

## 2021-12-30 ENCOUNTER — TELEMEDICINE (OUTPATIENT)
Dept: INTERNAL MEDICINE CLINIC | Facility: CLINIC | Age: 46
End: 2021-12-30
Payer: COMMERCIAL

## 2021-12-30 DIAGNOSIS — Z20.822 EXPOSURE TO COVID-19 VIRUS: ICD-10-CM

## 2021-12-30 DIAGNOSIS — B34.9 VIRAL INFECTION, UNSPECIFIED: ICD-10-CM

## 2021-12-30 PROCEDURE — U0003 INFECTIOUS AGENT DETECTION BY NUCLEIC ACID (DNA OR RNA); SEVERE ACUTE RESPIRATORY SYNDROME CORONAVIRUS 2 (SARS-COV-2) (CORONAVIRUS DISEASE [COVID-19]), AMPLIFIED PROBE TECHNIQUE, MAKING USE OF HIGH THROUGHPUT TECHNOLOGIES AS DESCRIBED BY CMS-2020-01-R: HCPCS | Performed by: FAMILY MEDICINE

## 2021-12-30 PROCEDURE — 99213 OFFICE O/P EST LOW 20 MIN: CPT | Performed by: FAMILY MEDICINE

## 2022-01-02 LAB — SARS-COV-2 RNA RESP QL NAA+PROBE: NEGATIVE

## 2022-01-11 ENCOUNTER — TELEPHONE (OUTPATIENT)
Dept: INTERNAL MEDICINE CLINIC | Facility: CLINIC | Age: 47
End: 2022-01-11

## 2022-01-11 DIAGNOSIS — Z12.31 BREAST CANCER SCREENING BY MAMMOGRAM: Primary | ICD-10-CM

## 2022-01-11 NOTE — TELEPHONE ENCOUNTER
Patient states her employer requires that she have a letter stating that her COVID test was negative and that she can safely return to work  She was tested on 12/30/21 and received her results on 1/2/22  Patient states that since her result was negative, she returned to work on 1/3/22  Put letter in [address]      Also, patient needs a new order for her mammogram

## 2022-01-11 NOTE — LETTER
January 11, 2022    Patient:  Enma Murphy  YOB: 1975  Date of Last Encounter: 12/30/2021    To whom it may concern:    Enma Murphy has tested negative for COVID-19 (Coronavirus)  She may return to work on 1/3/2022      Sincerely,        Jame Lentz DO

## 2022-01-18 ENCOUNTER — TELEMEDICINE (OUTPATIENT)
Dept: INTERNAL MEDICINE CLINIC | Facility: CLINIC | Age: 47
End: 2022-01-18
Payer: COMMERCIAL

## 2022-01-18 DIAGNOSIS — R10.31 ABDOMINAL PAIN, RIGHT LOWER QUADRANT: ICD-10-CM

## 2022-01-18 DIAGNOSIS — R11.2 NAUSEA VOMITING AND DIARRHEA: Primary | ICD-10-CM

## 2022-01-18 DIAGNOSIS — R19.7 NAUSEA VOMITING AND DIARRHEA: Primary | ICD-10-CM

## 2022-01-18 PROCEDURE — 99213 OFFICE O/P EST LOW 20 MIN: CPT | Performed by: FAMILY MEDICINE

## 2022-01-18 PROCEDURE — 99284 EMERGENCY DEPT VISIT MOD MDM: CPT

## 2022-01-18 NOTE — PROGRESS NOTES
Virtual Regular Visit    Verification of patient location:    Patient is located in the following state in which I hold an active license [place]      Assessment/Plan:    Problem List Items Addressed This Visit     None      Visit Diagnoses     Nausea vomiting and diarrhea    -  Primary    Abdominal pain, right lower quadrant            Severe enteritis vs appendicitis  Pt looks very unwell  recommend ER evaluation  Reason for visit is   Chief Complaint   Patient presents with    Virtual Regular Visit        Encounter provider Valdez Harmon DO    Provider located at [address] [place] [phone]      Recent Visits  Date Type Provider Dept   01/11/22 Telephone Baby Parrishs, [address] recent visits within past 7 days and meeting all other requirements  Today's Visits  Date Type Provider Dept   01/18/22 Telemedicine Baby Parrishs, [address] today's visits and meeting all other requirements  Future Appointments  No visits were found meeting these conditions  Showing future appointments within next 150 days and meeting all other requirements       The patient was identified by name and date of birth  Eli Hyatt was informed that this is a telemedicine visit and that the visit is being conducted through [facility] and patient was informed this is a secure, HIPAA-complaint platform  She agrees to proceed     My office door was closed  No one else was in the room  She acknowledged consent and understanding of privacy and security of the video platform  The patient has agreed to participate and understands they can discontinue the visit at any time  Patient is aware this is a billable service  Subjective  Eli Hyatt is a 48 y o  female    Symptoms noted below started Sunday  Has had >6 BMs a day  Non bloody   vomiting at least 3 or more times a day  Has abdominal pain that is constant  Still has her appendix  Limited respiratory sysmptoms  No sick contacts  Vaccinated against Covid  Past Medical History:   Diagnosis Date    Bladder perforation, intraoperative     Depression     Intestine, perforation (HCC)     intra op    Sepsis ([facility] [address] )        Past Surgical History:   Procedure Laterality Date    ABDOMINAL SURGERY      BLADDER SURGERY       SECTION      HYSTERECTOMY      INCISIONAL HERNIA REPAIR N/A 3/22/2021    Procedure: OPEN REPAIR VENTRAL/INCISIONAL HERNIA W/MESH;  Surgeon: Tanya Rodriguez MD;  Location: [facility];  Service: General       Current Outpatient Medications   Medication Sig Dispense Refill    benzonatate (TESSALON) 200 MG capsule Take 1 capsule (200 mg total) by mouth 3 (three) times a day as needed for cough 20 capsule 0    clonazePAM (KlonoPIN) 1 mg tablet take 0 5 tablet by mouth UP TO THREE TIMES A DAY if needed for anxiety      dicyclomine (BENTYL) 20 mg tablet Take 1 tablet (20 mg total) by mouth every 6 (six) hours As needed for abdominal cramping 30 tablet 0    DULoxetine (CYMBALTA) 60 mg delayed release capsule Take 60 mg by mouth daily      famotidine (PEPCID) 20 mg tablet Take 1 tablet (20 mg total) by mouth daily If no improvement may increase dose to twice daily   (Patient not taking: Reported on 2021) 30 tablet 1    methylPREDNISolone 4 MG tablet therapy pack Use as directed on package 21 each 0    ondansetron (Zofran ODT) 4 mg disintegrating tablet Take 1 tablet (4 mg total) by mouth every 6 (six) hours as needed for nausea or vomiting 20 tablet 0    oxyCODONE-acetaminophen (Percocet) 5-325 mg per tablet Take 1 tablet by mouth every 4 (four) hours as needed for severe painMax Daily Amount: 6 tablets 20 tablet 0    topiramate (TOPAMAX) 25 mg tablet Take 25 mg by mouth daily      topiramate (TOPAMAX) 50 MG tablet Take 50 mg by mouth daily      traZODone (DESYREL) 100 mg tablet take 1 TO 2 tablets by mouth AT NIGHT if needed for insomnia (Patient not taking: Reported on 7/6/2021)       No current facility-administered medications for this visit  No Known Allergies    Review of Systems   Constitutional: Positive for chills and fatigue  HENT: Positive for congestion  Respiratory: Negative for cough  Cardiovascular: Negative for chest pain  Gastrointestinal: Positive for abdominal pain, diarrhea, nausea and vomiting  Video Exam    There were no vitals filed for this visit  Physical Exam  Constitutional:       Appearance: She is ill-appearing  HENT:      Head: Normocephalic  Right Ear: External ear normal       Left Ear: External ear normal    Eyes:      Conjunctiva/sclera: Conjunctivae normal    Pulmonary:      Effort: Pulmonary effort is normal    Neurological:      Mental Status: She is alert  I spent 7 minutes directly with the patient during this visit    Michael Tabor verbally agrees to participate in [employer]  Pt is aware that [employer] could be limited without vital signs or the ability to perform a full hands-on physical exam  Zoila Guerrero understands she or the provider may request at any time to terminate the video visit and request the patient to seek care or treatment in person

## 2022-01-18 NOTE — LETTER
January 18, 2022     Patient: Richi Londono   YOB: 1975   Date of Visit: 1/18/2022       To Whom it May Concern:    Richi Londono is under my professional care  She was seen in my office on 1/18/2022  Please excuse her work absence on the aforementioned dated  If you have any questions or concerns, please don't hesitate to call           Sincerely,          Alfredo BOYKIN DO        CC: No Recipients

## 2022-01-19 ENCOUNTER — HOSPITAL ENCOUNTER (EMERGENCY)
Facility: HOSPITAL | Age: 47
Discharge: HOME | End: 2022-01-19
Attending: EMERGENCY MEDICINE
Payer: COMMERCIAL

## 2022-01-19 VITALS
OXYGEN SATURATION: 96 % | TEMPERATURE: 98.5 F | DIASTOLIC BLOOD PRESSURE: 70 MMHG | RESPIRATION RATE: 20 BRPM | BODY MASS INDEX: 29.59 KG/M2 | WEIGHT: 156.75 LBS | HEART RATE: 67 BPM | SYSTOLIC BLOOD PRESSURE: 110 MMHG | HEIGHT: 61 IN

## 2022-01-19 DIAGNOSIS — B34.9 VIRAL SYNDROME: ICD-10-CM

## 2022-01-19 DIAGNOSIS — R11.2 NAUSEA AND VOMITING: Primary | ICD-10-CM

## 2022-01-19 LAB
ALBUMIN SERPL BCP-MCNC: 3.8 G/DL (ref 3.5–5)
ALP SERPL-CCNC: 86 U/L (ref 46–116)
ALT SERPL W P-5'-P-CCNC: 14 U/L (ref 12–78)
ANION GAP SERPL CALCULATED.3IONS-SCNC: 9 MMOL/L (ref 4–13)
AST SERPL W P-5'-P-CCNC: <5 U/L (ref 5–45)
BASOPHILS # BLD AUTO: 0.03 THOUSANDS/ΜL (ref 0–0.1)
BASOPHILS NFR BLD AUTO: 0 % (ref 0–1)
BILIRUB SERPL-MCNC: 0.32 MG/DL (ref 0.2–1)
BUN SERPL-MCNC: 17 MG/DL (ref 5–25)
CALCIUM SERPL-MCNC: 9 MG/DL (ref 8.3–10.1)
CHLORIDE SERPL-SCNC: 104 MMOL/L (ref 100–108)
CO2 SERPL-SCNC: 24 MMOL/L (ref 21–32)
CREAT SERPL-MCNC: 1.03 MG/DL (ref 0.6–1.3)
EOSINOPHIL # BLD AUTO: 0.36 THOUSAND/ΜL (ref 0–0.61)
EOSINOPHIL NFR BLD AUTO: 4 % (ref 0–6)
ERYTHROCYTE [DISTWIDTH] IN BLOOD BY AUTOMATED COUNT: 13.7 % (ref 11.6–15.1)
GFR SERPL CREATININE-BSD FRML MDRD: 64 ML/MIN/1.73SQ M
GLUCOSE SERPL-MCNC: 90 MG/DL (ref 65–140)
HCT VFR BLD AUTO: 43.7 % (ref 34.8–46.1)
HGB BLD-MCNC: 13.7 G/DL (ref 11.5–15.4)
IMM GRANULOCYTES # BLD AUTO: 0.02 THOUSAND/UL (ref 0–0.2)
IMM GRANULOCYTES NFR BLD AUTO: 0 % (ref 0–2)
LIPASE SERPL-CCNC: 110 U/L (ref 73–393)
LYMPHOCYTES # BLD AUTO: 1.73 THOUSANDS/ΜL (ref 0.6–4.47)
LYMPHOCYTES NFR BLD AUTO: 17 % (ref 14–44)
MCH RBC QN AUTO: 28.7 PG (ref 26.8–34.3)
MCHC RBC AUTO-ENTMCNC: 31.4 G/DL (ref 31.4–37.4)
MCV RBC AUTO: 92 FL (ref 82–98)
MONOCYTES # BLD AUTO: 0.72 THOUSAND/ΜL (ref 0.17–1.22)
MONOCYTES NFR BLD AUTO: 7 % (ref 4–12)
NEUTROPHILS # BLD AUTO: 7.22 THOUSANDS/ΜL (ref 1.85–7.62)
NEUTS SEG NFR BLD AUTO: 72 % (ref 43–75)
NRBC BLD AUTO-RTO: 0 /100 WBCS
PLATELET # BLD AUTO: 294 THOUSANDS/UL (ref 149–390)
PMV BLD AUTO: 10.4 FL (ref 8.9–12.7)
POTASSIUM SERPL-SCNC: 3.6 MMOL/L (ref 3.5–5.3)
PROT SERPL-MCNC: 7.4 G/DL (ref 6.4–8.2)
RBC # BLD AUTO: 4.77 MILLION/UL (ref 3.81–5.12)
SODIUM SERPL-SCNC: 137 MMOL/L (ref 136–145)
WBC # BLD AUTO: 10.08 THOUSAND/UL (ref 4.31–10.16)

## 2022-01-19 PROCEDURE — 85025 COMPLETE CBC W/AUTO DIFF WBC: CPT | Performed by: EMERGENCY MEDICINE

## 2022-01-19 PROCEDURE — 99284 EMERGENCY DEPT VISIT MOD MDM: CPT | Performed by: EMERGENCY MEDICINE

## 2022-01-19 PROCEDURE — 96365 THER/PROPH/DIAG IV INF INIT: CPT

## 2022-01-19 PROCEDURE — 36415 COLL VENOUS BLD VENIPUNCTURE: CPT | Performed by: EMERGENCY MEDICINE

## 2022-01-19 PROCEDURE — 96375 TX/PRO/DX INJ NEW DRUG ADDON: CPT

## 2022-01-19 PROCEDURE — 80053 COMPREHEN METABOLIC PANEL: CPT | Performed by: EMERGENCY MEDICINE

## 2022-01-19 PROCEDURE — 83690 ASSAY OF LIPASE: CPT | Performed by: EMERGENCY MEDICINE

## 2022-01-19 RX ORDER — KETOROLAC TROMETHAMINE 30 MG/ML
15 INJECTION, SOLUTION INTRAMUSCULAR; INTRAVENOUS ONCE
Status: COMPLETED | OUTPATIENT
Start: 2022-01-19 | End: 2022-01-19

## 2022-01-19 RX ORDER — ONDANSETRON 4 MG/1
4 TABLET, ORALLY DISINTEGRATING ORAL EVERY 6 HOURS PRN
Qty: 20 TABLET | Refills: 0 | Status: SHIPPED | OUTPATIENT
Start: 2022-01-19 | End: 2022-03-07 | Stop reason: ALTCHOICE

## 2022-01-19 RX ORDER — METOCLOPRAMIDE HYDROCHLORIDE 5 MG/ML
10 INJECTION INTRAMUSCULAR; INTRAVENOUS ONCE
Status: COMPLETED | OUTPATIENT
Start: 2022-01-19 | End: 2022-01-19

## 2022-01-19 RX ORDER — MAGNESIUM SULFATE HEPTAHYDRATE 40 MG/ML
2 INJECTION, SOLUTION INTRAVENOUS ONCE
Status: COMPLETED | OUTPATIENT
Start: 2022-01-19 | End: 2022-01-19

## 2022-01-19 RX ORDER — DIPHENHYDRAMINE HYDROCHLORIDE 50 MG/ML
25 INJECTION INTRAMUSCULAR; INTRAVENOUS ONCE
Status: COMPLETED | OUTPATIENT
Start: 2022-01-19 | End: 2022-01-19

## 2022-01-19 RX ADMIN — MAGNESIUM SULFATE HEPTAHYDRATE 2 G: 40 INJECTION, SOLUTION INTRAVENOUS at 01:12

## 2022-01-19 RX ADMIN — KETOROLAC TROMETHAMINE 15 MG: 30 INJECTION, SOLUTION INTRAMUSCULAR at 01:02

## 2022-01-19 RX ADMIN — SODIUM CHLORIDE 1000 ML: 0.9 INJECTION, SOLUTION INTRAVENOUS at 00:50

## 2022-01-19 RX ADMIN — METOCLOPRAMIDE HYDROCHLORIDE 10 MG: 5 INJECTION INTRAMUSCULAR; INTRAVENOUS at 01:03

## 2022-01-19 RX ADMIN — DIPHENHYDRAMINE HYDROCHLORIDE 25 MG: 50 INJECTION, SOLUTION INTRAMUSCULAR; INTRAVENOUS at 01:02

## 2022-01-19 NOTE — Clinical Note
Nathan Paz was seen and treated in our emergency department on 1/18/2022  Diagnosis:     Gino Magdaleno  may return to work on return date  She may return on this date: 01/24/2022         If you have any questions or concerns, please don't hesitate to call        Suzie Baltazar MD    ______________________________           _______________          _______________  Hospital Representative                              Date                                Time
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
## 2022-01-24 ENCOUNTER — TELEPHONE (OUTPATIENT)
Dept: INTERNAL MEDICINE CLINIC | Facility: CLINIC | Age: 47
End: 2022-01-24

## 2022-01-24 ENCOUNTER — TELEMEDICINE (OUTPATIENT)
Dept: INTERNAL MEDICINE CLINIC | Facility: CLINIC | Age: 47
End: 2022-01-24
Payer: COMMERCIAL

## 2022-01-24 DIAGNOSIS — K13.79 MOUTH PAIN: ICD-10-CM

## 2022-01-24 DIAGNOSIS — B08.4 HAND, FOOT AND MOUTH DISEASE: Primary | ICD-10-CM

## 2022-01-24 PROCEDURE — 99213 OFFICE O/P EST LOW 20 MIN: CPT | Performed by: FAMILY MEDICINE

## 2022-01-24 NOTE — PROGRESS NOTES
Virtual Regular Visit    Verification of patient location:    Patient is located in the following state in which I hold an active license [place]      Assessment/Plan:    Problem List Items Addressed This Visit     None      Visit Diagnoses     Hand, foot and mouth disease    -  Primary    Relevant Medications    benzocaine (HURRICAINE) 20 % oral spray    Mouth pain        Relevant Medications    benzocaine (HURRICAINE) 20 % oral spray        Viral coxsackie infection unresolved  benzocaine for mouthpain and pt notified she can use immodium for the diahrrea  Pt should continue to avoid work until Monday  Work note provided  Reason for visit is   Chief Complaint   Patient presents with    Virtual Regular Visit        Encounter provider Laurie Camargo DO    Provider located at [address] [place] [id number]      Recent Visits  Date Type Provider Dept   01/18/22 Telemedicine Michael Santoyo recent visits within past 7 days and meeting all other requirements  Today's Visits  Date Type Provider Dept   01/24/22 Telephone [facility]   01/24/22 Telemedicine [address] today's visits and meeting all other requirements  Future Appointments  No visits were found meeting these conditions  Showing future appointments within next 150 days and meeting all other requirements       The patient was identified by name and date of birth  Sean Jessica was informed that this is a telemedicine visit and that the visit is being conducted through [facility] and patient was informed this is a secure, HIPAA-complaint platform  She agrees to proceed     My office door was closed  No one else was in the room  She acknowledged consent and understanding of privacy and security of the video platform   The patient has agreed to participate and understands they can discontinue the visit at any time  Patient is aware this is a billable service  Subjective  Tamia Grimaldo is a 48 y o  female   dianosed with hand foot mouth disease in ER  Works with children  Suppose to return to work today but still has new lesions in mouth  Still has diarrhea  Very fatigued  No fever  Fuad Usha told her not to return today  Needs note  Has continued mouth pains as well  Past Medical History:   Diagnosis Date    Bladder perforation, intraoperative     Depression     Intestine, perforation (HCC)     intra op    Sepsis ([address] )        Past Surgical History:   Procedure Laterality Date    ABDOMINAL SURGERY      BLADDER SURGERY       SECTION      HYSTERECTOMY      INCISIONAL HERNIA REPAIR N/A 3/22/2021    Procedure: OPEN REPAIR VENTRAL/INCISIONAL HERNIA W/MESH;  Surgeon: Jemma Dill MD;  Location: [facility];  Service: General       Current Outpatient Medications   Medication Sig Dispense Refill    al mag oxide-diphenhydramine-lidocaine viscous (MAGIC MOUTHWASH) 1:1:1 suspension Swish and spit 10 mL every 4 (four) hours as needed for mouth pain or discomfort for up to 7 days 90 mL 0    benzocaine (HURRICAINE) 20 % oral spray Apply 2 sprays topically 4 (four) times a day as needed for mild pain 57 mL 0    benzonatate (TESSALON) 200 MG capsule Take 1 capsule (200 mg total) by mouth 3 (three) times a day as needed for cough 20 capsule 0    clonazePAM (KlonoPIN) 1 mg tablet take 0 5 tablet by mouth UP TO THREE TIMES A DAY if needed for anxiety      dicyclomine (BENTYL) 20 mg tablet Take 1 tablet (20 mg total) by mouth every 6 (six) hours As needed for abdominal cramping 30 tablet 0    DULoxetine (CYMBALTA) 60 mg delayed release capsule Take 60 mg by mouth daily      famotidine (PEPCID) 20 mg tablet Take 1 tablet (20 mg total) by mouth daily If no improvement may increase dose to twice daily   (Patient not taking: Reported on 7/6/2021) 30 tablet 1    methylPREDNISolone 4 MG tablet therapy pack Use as directed on package 21 each 0    ondansetron (Zofran ODT) 4 mg disintegrating tablet Take 1 tablet (4 mg total) by mouth every 6 (six) hours as needed for nausea or vomiting 20 tablet 0    ondansetron (Zofran ODT) 4 mg disintegrating tablet Take 1 tablet (4 mg total) by mouth every 6 (six) hours as needed for nausea or vomiting 20 tablet 0    oxyCODONE-acetaminophen (Percocet) 5-325 mg per tablet Take 1 tablet by mouth every 4 (four) hours as needed for severe painMax Daily Amount: 6 tablets 20 tablet 0    topiramate (TOPAMAX) 25 mg tablet Take 25 mg by mouth daily      topiramate (TOPAMAX) 50 MG tablet Take 50 mg by mouth daily      traZODone (DESYREL) 100 mg tablet take 1 TO 2 tablets by mouth AT NIGHT if needed for insomnia (Patient not taking: Reported on 7/6/2021)       No current facility-administered medications for this visit  No Known Allergies    Review of Systems   Constitutional: Positive for fatigue  Negative for fever  Gastrointestinal: Positive for diarrhea  Skin: Positive for rash  Video Exam    There were no vitals filed for this visit  Physical Exam  Constitutional:       Comments: Tired in appearance    HENT:      Head: Normocephalic  Right Ear: External ear normal       Left Ear: External ear normal       Nose: Nose normal       Mouth/Throat:      Comments: Scabbed over lesion noted on oral mucosa  Eyes:      Conjunctiva/sclera: Conjunctivae normal    Pulmonary:      Effort: Pulmonary effort is normal    Neurological:      Mental Status: She is alert  I spent 6 minutes directly with the patient during this visit    Michael Tabor verbally agrees to participate in [employer]   Pt is aware that [employer] could be limited without vital signs or the ability to perform a full hands-on physical exam  Zoila Guerrero understands she or the provider may request at any time to terminate the video visit and request the patient to seek care or treatment in person

## 2022-01-24 NOTE — LETTER
January 24, 2022     Patient: Richi Londono   YOB: 1975   Date of Visit: 1/24/2022       To Whom it May Concern:    Richi Londono is under my professional care  She was seen in my office on 1/24/2022  She may return to work on 1/31/2022  If you have any questions or concerns, please don't hesitate to call           Sincerely,          Alfredo BOYKIN DO        CC: No Recipients

## 2022-02-21 ENCOUNTER — TELEMEDICINE (OUTPATIENT)
Dept: INTERNAL MEDICINE CLINIC | Facility: CLINIC | Age: 47
End: 2022-02-21
Payer: COMMERCIAL

## 2022-02-21 DIAGNOSIS — U07.1 COVID-19: Primary | ICD-10-CM

## 2022-02-21 PROCEDURE — 99213 OFFICE O/P EST LOW 20 MIN: CPT | Performed by: FAMILY MEDICINE

## 2022-02-21 PROCEDURE — U0005 INFEC AGEN DETEC AMPLI PROBE: HCPCS | Performed by: FAMILY MEDICINE

## 2022-02-21 PROCEDURE — U0003 INFECTIOUS AGENT DETECTION BY NUCLEIC ACID (DNA OR RNA); SEVERE ACUTE RESPIRATORY SYNDROME CORONAVIRUS 2 (SARS-COV-2) (CORONAVIRUS DISEASE [COVID-19]), AMPLIFIED PROBE TECHNIQUE, MAKING USE OF HIGH THROUGHPUT TECHNOLOGIES AS DESCRIBED BY CMS-2020-01-R: HCPCS | Performed by: FAMILY MEDICINE

## 2022-02-21 NOTE — PROGRESS NOTES
COVID-19 Outpatient Progress Note    Assessment/Plan:    Problem List Items Addressed This Visit     None      Visit Diagnoses     BSYDI-07    -  Primary    Relevant Orders    COVID Only - Office Collect         Disposition:     Recommended patient to come to the office to test for COVID-19  Discussed symptom directed medication options with patient  Discussed vitamin D, vitamin C, and/or zinc supplementation with patient  I have spent 5 minutes directly with the patient  Greater than 50% of this time was spent in counseling/coordination of care regarding: diagnostic results, prognosis, instructions for management and risk factor reductions  Encounter provider Amor Moy DO    Provider located at [address] [place] [address]    Recent Visits  No visits were found meeting these conditions  Showing recent visits within past 7 days and meeting all other requirements  Today's Visits  Date Type Provider Dept   02/21/22 Telemedicine [address] today's visits and meeting all other requirements  Future Appointments  No visits were found meeting these conditions  Showing future appointments within next 150 days and meeting all other requirements     This virtual check-in was done via [address] and patient was informed that this is a secure, HIPAA-compliant platform  She agrees to proceed  Patient agrees to participate in a virtual check in via telephone or video visit instead of presenting to the office to address urgent/immediate medical needs  Patient is aware this is a billable service  After connecting through [facility], the patient was identified by name and date of birth  Diego Nelson was informed that this was a telemedicine visit and that the exam was being conducted confidentially over secure lines  My office door was closed  No one else was in the room  Sindhu Eller acknowledged consent and understanding of privacy and security of the telemedicine visit  I informed the patient that I have reviewed her record in Epic and presented the opportunity for her to ask any questions regarding the visit today  The patient agreed to participate  Verification of patient location:  Patient is located in the following state in which I hold an active license: [place]    Subjective:   Sindhu Eller is a [address] y o  female who is concerned about COVID-19  Patient's symptoms include fever (102F), chills, malaise, cough, diarrhea, myalgias and headache      - Date of symptom onset: 2/20/2022      COVID-19 vaccination status: Fully vaccinated (primary series)    Exposure:   Contact with a person who is under investigation (PUI) for or who is positive for COVID-19 within the last 14 days?: Yes    Hospitalized recently for fever and/or lower respiratory symptoms?: No      Currently a healthcare worker that is involved in direct patient care?: No      Works in a special setting where the risk of COVID-19 transmission may be high? (this may include long-term care, correctional and senior care facilities; homeless shelters; assisted-living facilities and group homes ): No      Resident in a special setting where the risk of COVID-19 transmission may be high? (this may include long-term care, correctional and senior care facilities; homeless shelters; assisted-living facilities and group homes ): No       tested positive  Pt woke up with symptoms 4am on Sunday  Home test same day was positive  Need PCR for work   Employer doesn't accept home test      Lab Results   Component Value Date    SARSCOV2 Negative 12/30/2021    [facility] Not Detected 08/28/2021     Past Medical History:   Diagnosis Date    Bladder perforation, intraoperative     Depression     Intestine, perforation ([address] )     intra op    Sepsis ([address] )      Past Surgical History:   Procedure Laterality Date    ABDOMINAL SURGERY      BLADDER SURGERY       SECTION      HYSTERECTOMY      INCISIONAL HERNIA REPAIR N/A 3/22/2021    Procedure: OPEN REPAIR VENTRAL/INCISIONAL HERNIA W/MESH;  Surgeon: Shubham Carroll MD;  Location: [facility];  Service: General     Current Outpatient Medications   Medication Sig Dispense Refill    benzocaine (HURRICAINE) 20 % oral spray Apply 2 sprays topically 4 (four) times a day as needed for mild pain 57 mL 0    benzonatate (TESSALON) 200 MG capsule Take 1 capsule (200 mg total) by mouth 3 (three) times a day as needed for cough 20 capsule 0    clonazePAM (KlonoPIN) 1 mg tablet take 0 5 tablet by mouth UP TO THREE TIMES A DAY if needed for anxiety      dicyclomine (BENTYL) 20 mg tablet Take 1 tablet (20 mg total) by mouth every 6 (six) hours As needed for abdominal cramping 30 tablet 0    DULoxetine (CYMBALTA) 60 mg delayed release capsule Take 60 mg by mouth daily      famotidine (PEPCID) 20 mg tablet Take 1 tablet (20 mg total) by mouth daily If no improvement may increase dose to twice daily   (Patient not taking: Reported on 2021) 30 tablet 1    methylPREDNISolone 4 MG tablet therapy pack Use as directed on package 21 each 0    ondansetron (Zofran ODT) 4 mg disintegrating tablet Take 1 tablet (4 mg total) by mouth every 6 (six) hours as needed for nausea or vomiting 20 tablet 0    ondansetron (Zofran ODT) 4 mg disintegrating tablet Take 1 tablet (4 mg total) by mouth every 6 (six) hours as needed for nausea or vomiting 20 tablet 0    oxyCODONE-acetaminophen (Percocet) 5-325 mg per tablet Take 1 tablet by mouth every 4 (four) hours as needed for severe painMax Daily Amount: 6 tablets 20 tablet 0    topiramate (TOPAMAX) 25 mg tablet Take 25 mg by mouth daily      topiramate (TOPAMAX) 50 MG tablet Take 50 mg by mouth daily      traZODone (DESYREL) 100 mg tablet take 1 TO 2 tablets by mouth AT NIGHT if needed for insomnia (Patient not taking: Reported on 2021)       No current facility-administered medications for this visit  No Known Allergies    Review of Systems   Constitutional: Positive for chills and fever (102F)  Respiratory: Positive for cough  Gastrointestinal: Positive for diarrhea  Musculoskeletal: Positive for myalgias  Neurological: Positive for headaches  Objective: There were no vitals filed for this visit  Physical Exam  Constitutional:       General: She is not in acute distress  Appearance: She is ill-appearing  HENT:      Head: Normocephalic  Right Ear: External ear normal       Left Ear: External ear normal    Eyes:      Conjunctiva/sclera: Conjunctivae normal    Neurological:      Mental Status: She is alert  VIRTUAL VISIT DISCLAIMER    Francheska Smith verbally agrees to participate in [employer]  Pt is aware that [employer] could be limited without vital signs or the ability to perform a full hands-on physical exam  Zoila Guerrero understands she or the provider may request at any time to terminate the video visit and request the patient to seek care or treatment in person

## 2022-02-22 ENCOUNTER — TELEPHONE (OUTPATIENT)
Dept: INTERNAL MEDICINE CLINIC | Facility: CLINIC | Age: 47
End: 2022-02-22

## 2022-02-22 LAB — SARS-COV-2 RNA RESP QL NAA+PROBE: POSITIVE

## 2022-02-22 NOTE — TELEPHONE ENCOUNTER
----- Message from Jessica Fernandez DO sent at 2/22/2022 12:45 PM EST -----  Please notify pt that her testing was positive and a work note will be faxed today

## 2022-02-28 ENCOUNTER — TELEPHONE (OUTPATIENT)
Dept: INTERNAL MEDICINE CLINIC | Facility: CLINIC | Age: 47
End: 2022-02-28

## 2022-02-28 NOTE — TELEPHONE ENCOUNTER
This is in regards to a previous message about Dr Magali De La Rosa doing a letter for the pt- return to work one    She called back with this email: [email or web address]  [email or web address]     She couldn't find a Fax#    Pt woke up with headache and diarrhea  The letter has to updated; with the pt can go back to work    Dr Magali De La Rosa said she would add 3 more days; that she'd be off of work

## 2022-02-28 NOTE — TELEPHONE ENCOUNTER
Patient states she was to return to work today, but awoke this morning with diarrhea and headache  She will need a letter for her employer to extend her out of work time  She is asking Dr Jus Medrano to just let her know how many more days do she recommend for her to stay out of work  Pt will call back with fax number for employer so letter can be sent

## 2022-02-28 NOTE — LETTER
February 28, 2022     Patient: Rm Braun   YOB: 1975   Date of Visit: 2/28/2022       To Whom it May Concern:    Rm Braun is under my professional care  She was seen in my office on 2/28/2022  She may return to work on 3/3/2022  If you have any questions or concerns, please don't hesitate to call           Sincerely,          Calixto BOYKIN DO        CC: No Recipients

## 2022-03-07 ENCOUNTER — OFFICE VISIT (OUTPATIENT)
Dept: INTERNAL MEDICINE CLINIC | Facility: CLINIC | Age: 47
End: 2022-03-07
Payer: COMMERCIAL

## 2022-03-07 VITALS
OXYGEN SATURATION: 99 % | HEIGHT: 61 IN | TEMPERATURE: 98.3 F | SYSTOLIC BLOOD PRESSURE: 110 MMHG | BODY MASS INDEX: 32.1 KG/M2 | WEIGHT: 170 LBS | DIASTOLIC BLOOD PRESSURE: 70 MMHG | HEART RATE: 85 BPM

## 2022-03-07 DIAGNOSIS — E66.9 OBESITY (BMI 30-39.9): ICD-10-CM

## 2022-03-07 DIAGNOSIS — Z01.419 WOMEN'S ANNUAL ROUTINE GYNECOLOGICAL EXAMINATION: ICD-10-CM

## 2022-03-07 DIAGNOSIS — Z12.31 ENCOUNTER FOR SCREENING MAMMOGRAM FOR BREAST CANCER: ICD-10-CM

## 2022-03-07 DIAGNOSIS — E55.9 VITAMIN D DEFICIENCY: Primary | ICD-10-CM

## 2022-03-07 DIAGNOSIS — Z11.59 NEED FOR HEPATITIS C SCREENING TEST: ICD-10-CM

## 2022-03-07 DIAGNOSIS — Z11.1 SCREENING-PULMONARY TB: ICD-10-CM

## 2022-03-07 PROCEDURE — 86580 TB INTRADERMAL TEST: CPT

## 2022-03-07 PROCEDURE — 99214 OFFICE O/P EST MOD 30 MIN: CPT

## 2022-03-07 NOTE — PROGRESS NOTES
[facility] - [facility]    NAME: Sindhu Eller  AGE: 48 y o  SEX: female  : 1975     DATE: 3/7/2022     Assessment and Plan:     Diagnoses and all orders for this visit:    Vitamin D deficiency  -     Vitamin D 25 hydroxy; Future    Obesity (BMI 30-39 9)  -     Lipid panel; Future  -     TSH, 3rd generation with Free T4 reflex; Future  -     Hemoglobin A1C; Future    Need for hepatitis C screening test  -     Hepatitis C Antibody ([facility]); Future    Encounter for screening mammogram for breast cancer    Screening-pulmonary TB  -     TB Skin Test    Women's annual routine gynecological examination  -     Ambulatory Referral to Gynecology; Future            No follow-ups on file  Chief Complaint:     Chief Complaint   Patient presents with    Physical Exam        History of Present Illness:     Radha Sandoval presents to the office today for a physical exam for work  She has no complaints  She does have concerns related to hormone levels  She is concerned she may not have enough estrogen as right her testosterone levels  She has had a history of hysterectomy she has not followed up with gyn a number of years  I have recommended she follow-up with gynecology with these concerns  She is also starting a new job and needs PPD completed     Review of Systems:     Review of Systems   Constitutional: Negative  HENT: Negative  Eyes: Negative  Respiratory: Negative  Cardiovascular: Negative  Gastrointestinal: Negative  Genitourinary: Negative  Musculoskeletal: Negative  Skin: Negative  Neurological: Negative  Psychiatric/Behavioral: Positive for sleep disturbance (rarely)  Negative for suicidal ideas          Problem List:     Patient Active Problem List   Diagnosis    Incisional hernia, without obstruction or gangrene    Fibromyalgia    Numbness    MDD (major depressive disorder), recurrent, severe, with psychosis ([facility] [address] )    Somatoform disorder, unspecified    Disc degeneration, lumbar    Constipation    Arthritis    Generalized anxiety disorder    Vitamin D deficiency    Obesity (BMI 30-39  9)    Cigarette nicotine dependence without complication    Ventral hernia    Hypotension    Chronic pain syndrome    Cough    Chronic bilateral thoracic back pain        Objective:     /70   Pulse 85   Temp 98 3 °F (36 8 °C)   Ht 5' 1" (1 549 m)   Wt 77 1 kg (170 lb)   SpO2 99%   BMI 32 12 kg/m²     Physical Exam  Vitals and nursing note reviewed  Constitutional:       General: She is not in acute distress  Appearance: She is well-developed  She is obese  HENT:      Head: Normocephalic and atraumatic  Right Ear: Tympanic membrane normal       Left Ear: Tympanic membrane normal       Nose: Nose normal  No congestion or rhinorrhea  Mouth/Throat:      Mouth: Mucous membranes are moist       Pharynx: Oropharynx is clear  Eyes:      Conjunctiva/sclera: Conjunctivae normal       Pupils: Pupils are equal, round, and reactive to light  Neck:      Thyroid: No thyromegaly  Cardiovascular:      Rate and Rhythm: Normal rate and regular rhythm  Pulses: Normal pulses  Heart sounds: Normal heart sounds  No murmur heard  Pulmonary:      Effort: Pulmonary effort is normal  No respiratory distress  Breath sounds: Normal breath sounds  Abdominal:      General: Bowel sounds are normal       Palpations: Abdomen is soft  Tenderness: There is no abdominal tenderness  Musculoskeletal:         General: Normal range of motion  Cervical back: Normal range of motion and neck supple  Lymphadenopathy:      Cervical: No cervical adenopathy  Skin:     General: Skin is warm and dry  Capillary Refill: Capillary refill takes less than 2 seconds  Neurological:      General: No focal deficit present  Mental Status: She is alert and oriented to person, place, and time     Psychiatric: Mood and Affect: Mood normal          Behavior: Behavior normal          Thought Content: Thought content normal          Judgment: Judgment normal          I spent 20 minutes with this patient      [address] [place] [address]  [facility]

## 2022-03-28 ENCOUNTER — HOSPITAL ENCOUNTER (EMERGENCY)
Facility: HOSPITAL | Age: 47
Discharge: HOME | End: 2022-03-28
Attending: EMERGENCY MEDICINE | Admitting: EMERGENCY MEDICINE
Payer: COMMERCIAL

## 2022-03-28 ENCOUNTER — APPOINTMENT (EMERGENCY)
Dept: RADIOLOGY | Facility: HOSPITAL | Age: 47
End: 2022-03-28
Payer: COMMERCIAL

## 2022-03-28 VITALS
TEMPERATURE: 98.6 F | BODY MASS INDEX: 32.12 KG/M2 | HEART RATE: 90 BPM | WEIGHT: 169.97 LBS | RESPIRATION RATE: 18 BRPM | SYSTOLIC BLOOD PRESSURE: 96 MMHG | OXYGEN SATURATION: 96 % | DIASTOLIC BLOOD PRESSURE: 58 MMHG

## 2022-03-28 DIAGNOSIS — J20.9 ACUTE BRONCHITIS: Primary | ICD-10-CM

## 2022-03-28 DIAGNOSIS — J98.01 BRONCHOSPASM: ICD-10-CM

## 2022-03-28 DIAGNOSIS — J06.9 URI (UPPER RESPIRATORY INFECTION): ICD-10-CM

## 2022-03-28 LAB
ATRIAL RATE: 95 BPM
FLUAV RNA RESP QL NAA+PROBE: NEGATIVE
FLUBV RNA RESP QL NAA+PROBE: NEGATIVE
P AXIS: 75 DEGREES
PR INTERVAL: 134 MS
QRS AXIS: 63 DEGREES
QRSD INTERVAL: 70 MS
QT INTERVAL: 348 MS
QTC INTERVAL: 437 MS
RSV RNA RESP QL NAA+PROBE: NEGATIVE
SARS-COV-2 RNA RESP QL NAA+PROBE: NEGATIVE
T WAVE AXIS: 43 DEGREES
VENTRICULAR RATE: 95 BPM

## 2022-03-28 PROCEDURE — 0241U HB NFCT DS VIR RESP RNA 4 TRGT: CPT | Performed by: PHYSICIAN ASSISTANT

## 2022-03-28 PROCEDURE — 96360 HYDRATION IV INFUSION INIT: CPT

## 2022-03-28 PROCEDURE — 93010 ELECTROCARDIOGRAM REPORT: CPT | Performed by: INTERNAL MEDICINE

## 2022-03-28 PROCEDURE — 71045 X-RAY EXAM CHEST 1 VIEW: CPT

## 2022-03-28 PROCEDURE — 99284 EMERGENCY DEPT VISIT MOD MDM: CPT

## 2022-03-28 PROCEDURE — 93005 ELECTROCARDIOGRAM TRACING: CPT

## 2022-03-28 PROCEDURE — 99285 EMERGENCY DEPT VISIT HI MDM: CPT | Performed by: PHYSICIAN ASSISTANT

## 2022-03-28 PROCEDURE — 94640 AIRWAY INHALATION TREATMENT: CPT

## 2022-03-28 RX ORDER — ALBUTEROL SULFATE 2.5 MG/3ML
5 SOLUTION RESPIRATORY (INHALATION) ONCE
Status: COMPLETED | OUTPATIENT
Start: 2022-03-28 | End: 2022-03-28

## 2022-03-28 RX ORDER — ALBUTEROL SULFATE 2.5 MG/3ML
5 SOLUTION RESPIRATORY (INHALATION) EVERY 6 HOURS PRN
Qty: 75 ML | Refills: 0 | Status: SHIPPED | OUTPATIENT
Start: 2022-03-28

## 2022-03-28 RX ORDER — AZITHROMYCIN 250 MG/1
TABLET, FILM COATED ORAL
Qty: 6 TABLET | Refills: 0 | Status: SHIPPED | OUTPATIENT
Start: 2022-03-28 | End: 2022-04-01

## 2022-03-28 RX ORDER — FEXOFENADINE HCL AND PSEUDOEPHEDRINE HCI 60; 120 MG/1; MG/1
1 TABLET, EXTENDED RELEASE ORAL 2 TIMES DAILY PRN
Qty: 14 TABLET | Refills: 0 | Status: SHIPPED | OUTPATIENT
Start: 2022-03-28 | End: 2022-07-25

## 2022-03-28 RX ADMIN — SODIUM CHLORIDE 1000 ML: 0.9 INJECTION, SOLUTION INTRAVENOUS at 08:57

## 2022-03-28 RX ADMIN — ALBUTEROL SULFATE 5 MG: 2.5 SOLUTION RESPIRATORY (INHALATION) at 08:57

## 2022-03-28 RX ADMIN — IPRATROPIUM BROMIDE 0.5 MG: 0.5 SOLUTION RESPIRATORY (INHALATION) at 08:57

## 2022-03-28 NOTE — Clinical Note
Filomena Chin was seen and treated in our emergency department on 3/28/2022  No restrictions            Diagnosis:     Lesly Perales  may return to work on return date  She may return on this date: 04/01/2022         If you have any questions or concerns, please don't hesitate to call        Lazarus Pa, RN    ______________________________           _______________          _______________  Hospital Representative                              Date                                Time

## 2022-03-28 NOTE — ED PROVIDER NOTES
History  Chief Complaint   Patient presents with    Cough     cough, SOB and sinus cold since last sunday  Was seen at urgent care and given medication thats not helping      48 y o  female with past medical history significant for intraoperative bladder and intestinal perforation presents to ED with chief complaint of cough  Onset of symptoms reported as 1 week ago  Location of symptoms reported as the chest  Quality is reported as frequent cough  Severity is reported as moderate  Associated symptoms: denies fever,  Positive for cough, positive for congestion, denies lower extremity swelling, denies rash  Denies lip/tongue/facial swelling  Modifying factors:  Treated with cough medication and prednisone at local urgent care without relief of symptoms    Context:  Denies recent sick contacts  Denies recent travel outside the country  Immunizations reportedly up to date  Reviewed past medical history and visits via EPIC:  Patient last seen in the emergency department on 1/19/2022 for evaluation of nausea vomiting          History provided by:  Patient   used: No    Cough  Associated symptoms: rhinorrhea, shortness of breath and wheezing    Associated symptoms: no chest pain, no chills, no diaphoresis, no ear pain, no eye discharge, no fever, no headaches, no myalgias, no rash and no sore throat        Prior to Admission Medications   Prescriptions Last Dose Informant Patient Reported? Taking?    DULoxetine (CYMBALTA) 60 mg delayed release capsule 3/28/2022 at Unknown time Self Yes Yes   Sig: Take 60 mg by mouth daily   clonazePAM (KlonoPIN) 1 mg tablet 3/28/2022 at Unknown time Self Yes Yes   Sig: take 0 5 tablet by mouth UP TO THREE TIMES A DAY if needed for anxiety   topiramate (TOPAMAX) 25 mg tablet 3/28/2022 at Unknown time Self Yes Yes   Sig: Take 25 mg by mouth daily   topiramate (TOPAMAX) 50 MG tablet 3/28/2022 at Unknown time Self Yes Yes   Sig: Take 50 mg by mouth daily   traZODone (DESYREL) 100 mg tablet 3/27/2022 at Unknown time Self Yes Yes   Sig: take 1 TO 2 tablets by mouth AT NIGHT if needed for insomnia      Facility-Administered Medications: None       Past Medical History:   Diagnosis Date    Bladder perforation, intraoperative     Depression     Intestine, perforation (HCC)     intra op    Sepsis ([facility] [address] )        Past Surgical History:   Procedure Laterality Date    ABDOMINAL SURGERY      BLADDER SURGERY       SECTION      HYSTERECTOMY      INCISIONAL HERNIA REPAIR N/A 3/22/2021    Procedure: OPEN REPAIR VENTRAL/INCISIONAL HERNIA W/MESH;  Surgeon: Katie Bardales MD;  Location: [facility];  Service: General       Family History   Problem Relation Age of Onset    Cancer Father      I have reviewed and agree with the history as documented  E-Cigarette/Vaping    E-Cigarette Use Never User      E-Cigarette/Vaping Substances    Nicotine No     THC No     CBD No     Flavoring No     Other No     Unknown No      Social History     Tobacco Use    Smoking status: Current Every Day Smoker     Packs/day: 0 50    Smokeless tobacco: Never Used   Vaping Use    Vaping Use: Never used   Substance Use Topics    Alcohol use: Not Currently    Drug use: Never       Review of Systems   Constitutional: Negative for activity change, appetite change, chills, diaphoresis, fatigue, fever and unexpected weight change  HENT: Positive for congestion, postnasal drip, rhinorrhea and sinus pressure  Negative for dental problem, drooling, ear discharge, ear pain, facial swelling, hearing loss, mouth sores, nosebleeds, sinus pain, sneezing, sore throat, tinnitus, trouble swallowing and voice change  Eyes: Negative for photophobia, pain, discharge, redness, itching and visual disturbance  Respiratory: Positive for cough, shortness of breath and wheezing  Negative for apnea, choking, chest tightness and stridor      Cardiovascular: Negative for chest pain, palpitations and leg swelling  Gastrointestinal: Negative for abdominal distention, abdominal pain, anal bleeding, blood in stool, constipation, diarrhea, nausea, rectal pain and vomiting  Endocrine: Negative for cold intolerance, heat intolerance, polydipsia, polyphagia and polyuria  Genitourinary: Negative for decreased urine volume, difficulty urinating, dysuria, flank pain, hematuria and urgency  Musculoskeletal: Negative for arthralgias, back pain, gait problem, joint swelling, myalgias, neck pain and neck stiffness  Skin: Negative for color change, pallor, rash and wound  Allergic/Immunologic: Negative for environmental allergies, food allergies and immunocompromised state  Neurological: Negative for dizziness, tremors, seizures, syncope, facial asymmetry, speech difficulty, weakness, light-headedness, numbness and headaches  Hematological: Negative for adenopathy  Does not bruise/bleed easily  Psychiatric/Behavioral: Negative for agitation, behavioral problems and confusion  The patient is not nervous/anxious  All other systems reviewed and are negative  Physical Exam  Physical Exam  Vitals and nursing note reviewed  Constitutional:       General: She is not in acute distress  Appearance: Normal appearance  She is well-developed  She is not diaphoretic  Comments: BP 96/58 (BP Location: Left arm)   Pulse 90   Temp 98 6 °F (37 °C)   Resp 18   Wt 77 1 kg (169 lb 15 6 oz)   SpO2 96%   BMI 32 12 kg/m²      HENT:      Head: Normocephalic and atraumatic  Right Ear: External ear normal       Left Ear: External ear normal       Mouth/Throat:      Pharynx: No oropharyngeal exudate  Eyes:      General: No scleral icterus  Right eye: No discharge  Left eye: No discharge  Extraocular Movements: Extraocular movements intact  Conjunctiva/sclera: Conjunctivae normal    Neck:      Vascular: No JVD  Trachea: No tracheal deviation     Cardiovascular:      Rate and Rhythm: Normal rate and regular rhythm  Pulses: Normal pulses  Heart sounds: S1 normal and S2 normal    Pulmonary:      Effort: Pulmonary effort is normal  No respiratory distress  Breath sounds: No stridor  Wheezing and rhonchi present  No rales  Chest:      Chest wall: No tenderness  Abdominal:      General: Bowel sounds are normal  There is no distension  Palpations: Abdomen is soft  There is no mass  Tenderness: There is no abdominal tenderness  There is no guarding or rebound  Hernia: No hernia is present  Musculoskeletal:         General: No swelling, tenderness, deformity or signs of injury  Normal range of motion  Cervical back: Normal range of motion and neck supple  No rigidity or tenderness  Lymphadenopathy:      Cervical: No cervical adenopathy  Skin:     General: Skin is warm and dry  Capillary Refill: Capillary refill takes less than 2 seconds  Coloration: Skin is not jaundiced or pale  Findings: No erythema or rash  Neurological:      Mental Status: She is alert and oriented to person, place, and time  Cranial Nerves: No cranial nerve deficit  Sensory: No sensory deficit  Motor: No weakness or abnormal muscle tone  Coordination: Coordination normal       Deep Tendon Reflexes: Reflexes are normal and symmetric  Reflexes normal    Psychiatric:         Mood and Affect: Mood normal          Behavior: Behavior normal          Thought Content:  Thought content normal          Judgment: Judgment normal          Vital Signs  ED Triage Vitals   Temperature Pulse Respirations Blood Pressure SpO2   03/28/22 0814 03/28/22 0810 03/28/22 0810 03/28/22 0810 03/28/22 0810   98 6 °F (37 °C) 101 16 139/78 98 %      Temp src Heart Rate Source Patient Position - Orthostatic VS BP Location FiO2 (%)   -- 03/28/22 0900 03/28/22 0900 03/28/22 0900 --    Monitor Sitting Left arm       Pain Score       --                  Vitals:    03/28/22 0810 03/28/22 0900 03/28/22 0930 03/28/22 1000   BP: 139/78 99/54 106/60 96/58   Pulse: 101 79 86 90   Patient Position - Orthostatic VS:  Sitting Sitting Sitting         Visual Acuity      ED Medications  Medications   albuterol inhalation solution 5 mg (5 mg Nebulization Given 3/28/22 0857)   ipratropium (ATROVENT) 0 02 % inhalation solution 0 5 mg (0 5 mg Nebulization Given 3/28/22 0857)   sodium chloride 0 9 % bolus 1,000 mL (0 mL Intravenous Stopped 3/28/22 1006)       Diagnostic Studies  Results Reviewed     Procedure Component Value Units Date/Time    COVID/FLU/RSV - 2 hour TAT [[id number]]  (Normal) Collected: 03/28/22 0857    Lab Status: Final result Specimen: Nares from Nose Updated: 03/28/22 0941     SARS-CoV-2 Negative     INFLUENZA A PCR Negative     INFLUENZA B PCR Negative     RSV PCR Negative    Narrative:      FOR PEDIATRIC PATIENTS - copy/paste COVID Guidelines URL to browser: [email or web address]  [email or web address]    SARS-CoV-2 assay is a Nucleic Acid Amplification assay intended for the  qualitative detection of nucleic acid from SARS-CoV-2 in nasopharyngeal  swabs  Results are for the presumptive identification of SARS-CoV-2 RNA  Positive results are indicative of infection with SARS-CoV-2, the virus  causing COVID-19, but do not rule out bacterial infection or co-infection  with other viruses  Laboratories within the [place] and its  territories are required to report all positive results to the appropriate  public health authorities  Negative results do not preclude SARS-CoV-2  infection and should not be used as the sole basis for treatment or other  patient management decisions  Negative results must be combined with  clinical observations, patient history, and epidemiological information  This test has not been FDA cleared or approved  This test has been authorized by FDA under an Emergency Use Authorization  (EUA)   This test is only authorized for the duration of time the  declaration that circumstances exist justifying the authorization of the  emergency use of an in vitro diagnostic tests for detection of SARS-CoV-2  virus and/or diagnosis of COVID-19 infection under section 564(b)(1) of  the Act, 21 U  S C  199FYN-6(S)(9), unless the authorization is terminated  or revoked sooner  The test has been validated but independent review by FDA  and CLIA is pending  Test performed using [facility] GeneXpert: This RT-PCR assay targets N2,  a region unique to SARS-CoV-2  A conserved region in the E-gene was chosen  for pan-Sarbecovirus detection which includes SARS-CoV-2  XR chest 1 view portable   Final Result by Caroline Holly MD (03/28 8832)      No acute cardiopulmonary disease  Workstation performed: [id number]                    Procedures  ECG 12 Lead Documentation Only    Date/Time: 3/28/2022 8:14 AM  Performed by: Aura Leigh PA-C  Authorized by: Aura Leigh PA-C     Indications / Diagnosis:  Shortness of breath  ECG reviewed by me, the ED Provider: yes    Patient location:  ED  Previous ECG:     Previous ECG:  Unavailable    Comparison to cardiac monitor: Yes    Interpretation:     Interpretation: normal    Rate:     ECG rate:  95    ECG rate assessment: normal    Rhythm:     Rhythm: sinus rhythm    Ectopy:     Ectopy: none    QRS:     QRS axis:  Normal    QRS intervals:  Normal  Conduction:     Conduction: normal    ST segments:     ST segments:  Normal  T waves:     T waves: non-specific               ED Course                               SBIRT 20yo+      Most Recent Value   SBIRT (22 yo +)    In order to provide better care to our patients, we are screening all of our patients for alcohol and drug use  Would it be okay to ask you these screening questions? Yes Filed at: 03/28/2022 6391   Initial Alcohol Screen: US AUDIT-C     1   How often do you have a drink containing alcohol? 0 Filed at: 03/28/2022 0822   2  How many drinks containing alcohol do you have on a typical day you are drinking? 0 Filed at: 03/28/2022 0822   3b  FEMALE Any Age, or MALE 65+: How often do you have 4 or more drinks on one occassion? 0 Filed at: 03/28/2022 4084   Audit-C Score 0 Filed at: 03/28/2022 6350   HARVINDER: How many times in the past year have you    Used an illegal drug or used a prescription medication for non-medical reasons? Never Filed at: 03/28/2022 3114                    MDM  Number of Diagnoses or Management Options  Acute bronchitis: new and requires workup  Bronchospasm: new and requires workup  URI (upper respiratory infection): new and requires workup  Diagnosis management comments: MDM: Given the large differential diagnosis for this patient, the decision making in this case is of high complexity  ddx includes but is not limited to:  URI, flu, allergies, asthma, environmental exposures, foreign body, GERD, bronchitis, pneumonia, consider but doubt chf, pe, interstitial lung disease, pertussis  ED results:  CXR images independently visualized and interpreted by me  I was the initial   no infiltrate or PTX  COVID and flu test were performed and negative  ED course:  43-year-old female presents emergency department with cough and congestion times the past week  Prescribed prednisone and cough medication very urgent care without relief of symptoms  No infiltrate on x-ray  COVID and flu negative  EKG demonstrates no ischemic changes  ED final assessment:  1  Acute bronchitis  2  Bronchospasm  3  URI  Discussed treatment plan including coverage with azithromycin for bronchitis  Will treat with decongestant  Add bronchodilators via nebulizer with albuterol  Discussed supportive care including rest, encourage fluids  Follow-up with PCP for recheck in 3-5 days  Follow-up with pulmonology in 1-2 weeks if no improvement in symptoms    I discussed diagnosis and treatment plan with patient at bedside  Extended discussion with patient regarding the diagnosis, pathophysiology, expectant coarse and treatment plan  Instructed to follow up with pcp and recommended specialist in 3-5 days  Reviewed reasons to return to ed  Patient verbalized understanding of diagnosis and agreement with discharge plan of care as well as understanding of reasons to return to ed  Amount and/or Complexity of Data Reviewed  Clinical lab tests: ordered and reviewed  Tests in the radiology section of CPT®: ordered and reviewed  Tests in the medicine section of CPT®: ordered and reviewed  Discussion of test results with the performing providers: yes  Review and summarize past medical records: yes  Independent visualization of images, tracings, or specimens: yes    Risk of Complications, Morbidity, and/or Mortality  General comments: Disclaimers:    I have reasonably determine that electronically prescribing a controlled substance would be impractical for the patient to obtain the controlled substance prescribed by electronic prescription or would cause an untimely delay resulting in an adverse impact on the patient's medical condition        Patient was seen during the outbreak of the corona virus epidemic   Resources are limited due to the severity of patient illnesses associated with virus   Testing is also limited at this time   Discussed with patient at the time of this evaluation   Due to the fact that limited resources are available -treatment options are limited        Patient Progress  Patient progress: stable      Disposition  Final diagnoses:   Acute bronchitis   Bronchospasm   URI (upper respiratory infection)     Time reflects when diagnosis was documented in both MDM as applicable and the Disposition within this note     Time User Action Codes Description Comment    3/28/2022  8:45 AM So Reyes Add [J20 9] Acute bronchitis     3/28/2022  8:46 AM So Garcia [J98 01] Bronchospasm 3/28/2022  8:46 AM Bobby Cirojoe Radha [J06 9] URI (upper respiratory infection)       ED Disposition     ED Disposition Condition Date/Time Comment    Discharge Stable Mon Mar 28, 2022  8:46 AM Destiny Solis discharge to home/self care  Follow-up Information     Follow up With Specialties Details Why Contact Info Additional [address] [facility] Emergency Department Emergency Medicine Go to  If symptoms worsen [address] [facility] [id number] [facility], [place], [place], [address]    Verena Valenzuela MD Pulmonology, Neurology, Pulmonary Disease, Sleep Medicine Call in 1 week for further evaluation of symptoms [phone] [id number]  25 [facility] Family Medicine Call in 2 days for further evaluation of symptoms [address] [facility] [address] [facility]             Discharge Medication List as of 3/28/2022  9:43 AM      START taking these medications    Details   albuterol (2 5 mg/3 mL) 0 083 % nebulizer solution Take 6 mL (5 mg total) by nebulization every 6 (six) hours as needed for wheezing, Starting Mon 3/28/2022, Normal      azithromycin (ZITHROMAX) 250 mg tablet 2 tablets PO on day 1, then 1 tablet PO daily x 4 days  , Normal      fexofenadine-pseudoephedrine (ALLEGRA-D)  MG per tablet Take 1 tablet by mouth 2 (two) times a day as needed for allergies (congestion), Starting Mon 3/28/2022, Normal         CONTINUE these medications which have NOT CHANGED    Details   clonazePAM (KlonoPIN) 1 mg tablet take 0 5 tablet by mouth UP TO THREE TIMES A DAY if needed for anxiety, Historical Med      DULoxetine (CYMBALTA) 60 mg delayed release capsule Take 60 mg by mouth daily, Starting Tue 12/1/2020, Historical Med      !! topiramate (TOPAMAX) 25 mg tablet Take 25 mg by mouth daily, Starting Sun 1/3/2021, Historical Med      !! topiramate (TOPAMAX) 50 MG tablet Take 50 mg by mouth daily, Starting Sun 1/3/2021, Historical Med      traZODone (DESYREL) 100 mg tablet take 1 TO 2 tablets by mouth AT NIGHT if needed for insomnia, Historical Med       !! - Potential duplicate medications found  Please discuss with provider            Outpatient Discharge Orders   Nebulizer       PDMP Review     None          ED Provider  Electronically Signed by           Janie Gonzales PA-C  03/30/22 6086

## 2022-04-25 NOTE — TELEPHONE ENCOUNTER
Pt had a virtual appt with you today- she had to get out of the waiting room- because all these pop up advertisements came up- she knew that her appt would be over    Can you please give her a call- she has 2 New blisters from her hand, foot, and mouth disease- this AM    I lost the pt on her cell phone to let her know; you'd be giving her a call 25-Apr-2022 19:32

## 2022-05-09 ENCOUNTER — HOSPITAL ENCOUNTER (EMERGENCY)
Facility: HOSPITAL | Age: 47
Discharge: HOME | End: 2022-05-09
Attending: EMERGENCY MEDICINE
Payer: COMMERCIAL

## 2022-05-09 ENCOUNTER — APPOINTMENT (EMERGENCY)
Dept: CT IMAGING | Facility: HOSPITAL | Age: 47
End: 2022-05-09
Payer: COMMERCIAL

## 2022-05-09 VITALS
RESPIRATION RATE: 18 BRPM | TEMPERATURE: 98.5 F | OXYGEN SATURATION: 96 % | HEART RATE: 81 BPM | DIASTOLIC BLOOD PRESSURE: 69 MMHG | SYSTOLIC BLOOD PRESSURE: 106 MMHG

## 2022-05-09 DIAGNOSIS — R10.9 ABDOMINAL PAIN: Primary | ICD-10-CM

## 2022-05-09 LAB
ALBUMIN SERPL BCP-MCNC: 3.9 G/DL (ref 3.5–5)
ALP SERPL-CCNC: 75 U/L (ref 46–116)
ALT SERPL W P-5'-P-CCNC: 13 U/L (ref 12–78)
ANION GAP SERPL CALCULATED.3IONS-SCNC: 9 MMOL/L (ref 4–13)
AST SERPL W P-5'-P-CCNC: 14 U/L (ref 5–45)
BASOPHILS # BLD AUTO: 0.04 THOUSANDS/ΜL (ref 0–0.1)
BASOPHILS NFR BLD AUTO: 1 % (ref 0–1)
BILIRUB SERPL-MCNC: 0.33 MG/DL (ref 0.2–1)
BILIRUB UR QL STRIP: NEGATIVE
BUN SERPL-MCNC: 10 MG/DL (ref 5–25)
CALCIUM SERPL-MCNC: 9.5 MG/DL (ref 8.3–10.1)
CARDIAC TROPONIN I PNL SERPL HS: <2 NG/L
CHLORIDE SERPL-SCNC: 106 MMOL/L (ref 100–108)
CLARITY UR: CLEAR
CO2 SERPL-SCNC: 26 MMOL/L (ref 21–32)
COLOR UR: YELLOW
CREAT SERPL-MCNC: 1 MG/DL (ref 0.6–1.3)
EOSINOPHIL # BLD AUTO: 0.12 THOUSAND/ΜL (ref 0–0.61)
EOSINOPHIL NFR BLD AUTO: 1 % (ref 0–6)
ERYTHROCYTE [DISTWIDTH] IN BLOOD BY AUTOMATED COUNT: 14.1 % (ref 11.6–15.1)
GFR SERPL CREATININE-BSD FRML MDRD: 66 ML/MIN/1.73SQ M
GLUCOSE SERPL-MCNC: 83 MG/DL (ref 65–140)
GLUCOSE UR STRIP-MCNC: NEGATIVE MG/DL
HCT VFR BLD AUTO: 43.7 % (ref 34.8–46.1)
HGB BLD-MCNC: 14 G/DL (ref 11.5–15.4)
HGB UR QL STRIP.AUTO: NEGATIVE
IMM GRANULOCYTES # BLD AUTO: 0.02 THOUSAND/UL (ref 0–0.2)
IMM GRANULOCYTES NFR BLD AUTO: 0 % (ref 0–2)
INR PPP: 1 (ref 0.84–1.19)
KETONES UR STRIP-MCNC: NEGATIVE MG/DL
LACTATE SERPL-SCNC: 0.4 MMOL/L (ref 0.5–2)
LEUKOCYTE ESTERASE UR QL STRIP: NEGATIVE
LYMPHOCYTES # BLD AUTO: 3.52 THOUSANDS/ΜL (ref 0.6–4.47)
LYMPHOCYTES NFR BLD AUTO: 41 % (ref 14–44)
MCH RBC QN AUTO: 29.4 PG (ref 26.8–34.3)
MCHC RBC AUTO-ENTMCNC: 32 G/DL (ref 31.4–37.4)
MCV RBC AUTO: 92 FL (ref 82–98)
MONOCYTES # BLD AUTO: 0.52 THOUSAND/ΜL (ref 0.17–1.22)
MONOCYTES NFR BLD AUTO: 6 % (ref 4–12)
NEUTROPHILS # BLD AUTO: 4.44 THOUSANDS/ΜL (ref 1.85–7.62)
NEUTS SEG NFR BLD AUTO: 51 % (ref 43–75)
NITRITE UR QL STRIP: NEGATIVE
NRBC BLD AUTO-RTO: 0 /100 WBCS
PH UR STRIP.AUTO: 6 [PH]
PLATELET # BLD AUTO: 315 THOUSANDS/UL (ref 149–390)
PMV BLD AUTO: 10.6 FL (ref 8.9–12.7)
POTASSIUM SERPL-SCNC: 4.1 MMOL/L (ref 3.5–5.3)
PROT SERPL-MCNC: 7.5 G/DL (ref 6.4–8.2)
PROT UR STRIP-MCNC: NEGATIVE MG/DL
PROTHROMBIN TIME: 12.8 SECONDS (ref 11.6–14.5)
RBC # BLD AUTO: 4.77 MILLION/UL (ref 3.81–5.12)
SODIUM SERPL-SCNC: 141 MMOL/L (ref 136–145)
SP GR UR STRIP.AUTO: 1.02 (ref 1–1.03)
UROBILINOGEN UR QL STRIP.AUTO: 0.2 E.U./DL
WBC # BLD AUTO: 8.66 THOUSAND/UL (ref 4.31–10.16)

## 2022-05-09 PROCEDURE — 85025 COMPLETE CBC W/AUTO DIFF WBC: CPT | Performed by: EMERGENCY MEDICINE

## 2022-05-09 PROCEDURE — 96361 HYDRATE IV INFUSION ADD-ON: CPT

## 2022-05-09 PROCEDURE — 96375 TX/PRO/DX INJ NEW DRUG ADDON: CPT

## 2022-05-09 PROCEDURE — 93005 ELECTROCARDIOGRAM TRACING: CPT

## 2022-05-09 PROCEDURE — 74176 CT ABD & PELVIS W/O CONTRAST: CPT

## 2022-05-09 PROCEDURE — 99285 EMERGENCY DEPT VISIT HI MDM: CPT | Performed by: EMERGENCY MEDICINE

## 2022-05-09 PROCEDURE — 96374 THER/PROPH/DIAG INJ IV PUSH: CPT

## 2022-05-09 PROCEDURE — 85610 PROTHROMBIN TIME: CPT | Performed by: EMERGENCY MEDICINE

## 2022-05-09 PROCEDURE — 36415 COLL VENOUS BLD VENIPUNCTURE: CPT | Performed by: EMERGENCY MEDICINE

## 2022-05-09 PROCEDURE — 80053 COMPREHEN METABOLIC PANEL: CPT | Performed by: EMERGENCY MEDICINE

## 2022-05-09 PROCEDURE — G1004 CDSM NDSC: HCPCS

## 2022-05-09 PROCEDURE — 84484 ASSAY OF TROPONIN QUANT: CPT | Performed by: EMERGENCY MEDICINE

## 2022-05-09 PROCEDURE — 99284 EMERGENCY DEPT VISIT MOD MDM: CPT

## 2022-05-09 PROCEDURE — 81003 URINALYSIS AUTO W/O SCOPE: CPT | Performed by: EMERGENCY MEDICINE

## 2022-05-09 PROCEDURE — 83605 ASSAY OF LACTIC ACID: CPT | Performed by: EMERGENCY MEDICINE

## 2022-05-09 RX ORDER — ONDANSETRON 2 MG/ML
4 INJECTION INTRAMUSCULAR; INTRAVENOUS ONCE
Status: COMPLETED | OUTPATIENT
Start: 2022-05-09 | End: 2022-05-09

## 2022-05-09 RX ORDER — DICYCLOMINE HYDROCHLORIDE 10 MG/1
20 CAPSULE ORAL ONCE
Status: COMPLETED | OUTPATIENT
Start: 2022-05-09 | End: 2022-05-09

## 2022-05-09 RX ORDER — DICYCLOMINE HCL 20 MG
20 TABLET ORAL 2 TIMES DAILY
Qty: 20 TABLET | Refills: 0 | Status: SHIPPED | OUTPATIENT
Start: 2022-05-09 | End: 2022-05-12 | Stop reason: SDUPTHER

## 2022-05-09 RX ORDER — MORPHINE SULFATE 10 MG/ML
6 INJECTION, SOLUTION INTRAMUSCULAR; INTRAVENOUS ONCE
Status: COMPLETED | OUTPATIENT
Start: 2022-05-09 | End: 2022-05-09

## 2022-05-09 RX ORDER — ONDANSETRON 4 MG/1
4 TABLET, ORALLY DISINTEGRATING ORAL EVERY 8 HOURS PRN
Qty: 20 TABLET | Refills: 0 | Status: SHIPPED | OUTPATIENT
Start: 2022-05-09 | End: 2022-05-12 | Stop reason: SDUPTHER

## 2022-05-09 RX ADMIN — MORPHINE SULFATE 6 MG: 10 INJECTION INTRAVENOUS at 20:45

## 2022-05-09 RX ADMIN — SODIUM CHLORIDE 1000 ML: 0.9 INJECTION, SOLUTION INTRAVENOUS at 20:40

## 2022-05-09 RX ADMIN — ONDANSETRON 4 MG: 2 INJECTION INTRAMUSCULAR; INTRAVENOUS at 20:44

## 2022-05-09 RX ADMIN — DICYCLOMINE HYDROCHLORIDE 20 MG: 10 CAPSULE ORAL at 22:56

## 2022-05-09 NOTE — Clinical Note
Trudy Pruitt was seen and treated in our emergency department on 5/9/2022  No restrictions            Diagnosis:     Rajan Raines  may return to work on return date  She may return on this date: If you have any questions or concerns, please don't hesitate to call        Geri Chambers, DO    ______________________________           _______________          _______________  Hospital Representative                              Date                                Time

## 2022-05-09 NOTE — ED PROVIDER NOTES
History  Chief Complaint   Patient presents with    Abdominal Pain     pt c/o abd pain with nausea x 5 days; pt had similar experience in past  pt states hx reconstructive bowel surgery and perforated bowel and bladder     48year old female patient with history of previous abdominal surgeries including hysterectomy and hernia repair comes to the ED with cc of nausea, vomiting and abdominal pain  Pain has been worse for the past 5 days  She as not bee able to eat or drink without nausea  Will CT for possible obstruction  CT done with out IV contrast due to international shortage of contrast material        History provided by:  Patient   used: No    Abdominal Pain  Pain location:  Generalized  Pain quality: aching    Pain radiates to:  Does not radiate  Pain severity:  Mild  Onset quality:  Gradual  Duration:  5 days  Timing:  Constant  Progression:  Worsening  Chronicity:  New  Relieved by:  Nothing  Worsened by:  Nothing  Ineffective treatments:  None tried  Associated symptoms: no belching, no diarrhea and no hematemesis        Prior to Admission Medications   Prescriptions Last Dose Informant Patient Reported? Taking?    DULoxetine (CYMBALTA) 60 mg delayed release capsule  Self Yes No   Sig: Take 60 mg by mouth daily   albuterol (2 5 mg/3 mL) 0 083 % nebulizer solution   No No   Sig: Take 6 mL (5 mg total) by nebulization every 6 (six) hours as needed for wheezing   clonazePAM (KlonoPIN) 1 mg tablet  Self Yes No   Sig: take 0 5 tablet by mouth UP TO THREE TIMES A DAY if needed for anxiety   fexofenadine-pseudoephedrine (ALLEGRA-D)  MG per tablet   No No   Sig: Take 1 tablet by mouth 2 (two) times a day as needed for allergies (congestion)   topiramate (TOPAMAX) 25 mg tablet  Self Yes No   Sig: Take 25 mg by mouth daily   topiramate (TOPAMAX) 50 MG tablet  Self Yes No   Sig: Take 50 mg by mouth daily   traZODone (DESYREL) 100 mg tablet  Self Yes No   Sig: take 1 TO 2 tablets by mouth AT NIGHT if needed for insomnia      Facility-Administered Medications: None       Past Medical History:   Diagnosis Date    Bladder perforation, intraoperative     Depression     Intestine, perforation (HCC)     intra op    Sepsis ([address] )        Past Surgical History:   Procedure Laterality Date    ABDOMINAL SURGERY      BLADDER SURGERY       SECTION      HYSTERECTOMY      INCISIONAL HERNIA REPAIR N/A 3/22/2021    Procedure: OPEN REPAIR VENTRAL/INCISIONAL HERNIA W/MESH;  Surgeon: Danielle Mann MD;  Location: [facility];  Service: General       Family History   Problem Relation Age of Onset    Cancer Father      I have reviewed and agree with the history as documented  E-Cigarette/Vaping    E-Cigarette Use Never User      E-Cigarette/Vaping Substances    Nicotine No     THC No     CBD No     Flavoring No     Other No     Unknown No      Social History     Tobacco Use    Smoking status: Current Every Day Smoker     Packs/day: 0 50    Smokeless tobacco: Never Used   Vaping Use    Vaping Use: Never used   Substance Use Topics    Alcohol use: Not Currently    Drug use: Never       Review of Systems   Gastrointestinal: Positive for abdominal pain  Negative for diarrhea and hematemesis  All other systems reviewed and are negative  Physical Exam  Physical Exam  Vitals and nursing note reviewed  Constitutional:       Appearance: She is well-developed  HENT:      Head: Normocephalic and atraumatic  Right Ear: External ear normal       Left Ear: External ear normal    Eyes:      Conjunctiva/sclera: Conjunctivae normal    Neck:      Thyroid: No thyromegaly  Vascular: No JVD  Trachea: No tracheal deviation  Cardiovascular:      Rate and Rhythm: Normal rate  Pulmonary:      Effort: Pulmonary effort is normal       Breath sounds: Normal breath sounds  No stridor  Abdominal:      General: There is no distension  Palpations: Abdomen is soft  There is no mass  Tenderness: There is no abdominal tenderness  There is no guarding  Hernia: No hernia is present  Musculoskeletal:         General: No tenderness or deformity  Normal range of motion  Lymphadenopathy:      Cervical: No cervical adenopathy  Skin:     General: Skin is warm  Coloration: Skin is not pale  Findings: No erythema or rash  Neurological:      Mental Status: She is alert and oriented to person, place, and time     Psychiatric:         Behavior: Behavior normal          Vital Signs  ED Triage Vitals   Temperature Pulse Respirations Blood Pressure SpO2   05/09/22 1814 05/09/22 1814 05/09/22 1814 05/09/22 1814 05/09/22 1814   98 5 °F (36 9 °C) 90 18 129/71 98 %      Temp Source Heart Rate Source Patient Position - Orthostatic VS BP Location FiO2 (%)   05/09/22 1814 -- 05/09/22 1814 05/09/22 1814 --   Oral  Sitting Left arm       Pain Score       05/09/22 2045       8           Vitals:    05/09/22 1814 05/09/22 2053 05/09/22 2100 05/09/22 2130   BP: 129/71 112/71 102/62 106/69   Pulse: 90 80 76 81   Patient Position - Orthostatic VS: Sitting Lying           Visual Acuity      ED Medications  Medications   ondansetron (ZOFRAN) injection 4 mg (4 mg Intravenous Given 5/9/22 2044)   morphine 10 mg/mL injection 6 mg (6 mg Intravenous Given 5/9/22 2045)   sodium chloride 0 9 % bolus 1,000 mL (0 mL Intravenous Stopped 5/9/22 2140)   dicyclomine (BENTYL) capsule 20 mg (20 mg Oral Given 5/9/22 2256)       Diagnostic Studies  Results Reviewed     Procedure Component Value Units Date/Time    UA w Reflex to Microscopic w Reflex to Culture [[id number]] Collected: 05/09/22 2216    Lab Status: Final result Specimen: Urine, Clean Catch Updated: 05/09/22 2230     Color, UA Yellow     Clarity, UA Clear     Specific Gravity, UA 1 025     pH, UA 6 0     Leukocytes, UA Negative     Nitrite, UA Negative     Protein, UA Negative mg/dl      Glucose, UA Negative mg/dl      Ketones, UA Negative mg/dl Urobilinogen, UA 0 2 E U /dl      Bilirubin, UA Negative     Blood, UA Negative    HS Troponin 0hr (reflex protocol) [[id number]]  (Normal) Collected: 05/09/22 2039    Lab Status: Final result Specimen: Blood from Arm, Left Updated: 05/09/22 2117     hs TnI 0hr <2 ng/L     CBC and differential [[id number]] Collected: 05/09/22 2039    Lab Status: Final result Specimen: Blood from Arm, Left Updated: 05/09/22 2103     WBC 8 66 Thousand/uL      RBC 4 77 Million/uL      Hemoglobin 14 0 g/dL      Hematocrit 43 7 %      MCV 92 fL      MCH 29 4 pg      MCHC 32 0 g/dL      RDW 14 1 %      MPV 10 6 fL      Platelets 502 Thousands/uL      nRBC 0 /100 WBCs      Neutrophils Relative 51 %      Immat GRANS % 0 %      Lymphocytes Relative 41 %      Monocytes Relative 6 %      Eosinophils Relative 1 %      Basophils Relative 1 %      Neutrophils Absolute 4 44 Thousands/µL      Immature Grans Absolute 0 02 Thousand/uL      Lymphocytes Absolute 3 52 Thousands/µL      Monocytes Absolute 0 52 Thousand/µL      Eosinophils Absolute 0 12 Thousand/µL      Basophils Absolute 0 04 Thousands/µL     Lactic acid, plasma [[id number]]  (Abnormal) Collected: 05/09/22 2027    Lab Status: Final result Specimen: Blood from Arm, Right Updated: 05/09/22 2102     LACTIC ACID 0 4 mmol/L     Narrative:      Result may be elevated if tourniquet was used during collection      Sharri Johnson [[id number]]  (Normal) Collected: 05/09/22 2039    Lab Status: Final result Specimen: Blood from Arm, Left Updated: 05/09/22 2100     Protime 12 8 seconds      INR 1 00    Comprehensive metabolic panel [[id number]] Collected: 05/09/22 2027    Lab Status: Final result Specimen: Blood from Arm, Right Updated: 05/09/22 2059     Sodium 141 mmol/L      Potassium 4 1 mmol/L      Chloride 106 mmol/L      CO2 26 mmol/L      ANION GAP 9 mmol/L      BUN 10 mg/dL      Creatinine 1 00 mg/dL      Glucose 83 mg/dL      Calcium 9 5 mg/dL      AST 14 U/L      ALT 13 U/L      Alkaline Phosphatase 75 U/L      Total Protein 7 5 g/dL      Albumin 3 9 g/dL      Total Bilirubin 0 33 mg/dL      eGFR 66 ml/min/1 73sq m     Narrative:      [place] guidelines for Chronic Kidney Disease (CKD):     Stage 1 with normal or high GFR (GFR > 90 mL/min/1 73 square meters)    Stage 2 Mild CKD (GFR = 60-89 mL/min/1 73 square meters)    Stage 3A Moderate CKD (GFR = 45-59 mL/min/1 73 square meters)    Stage 3B Moderate CKD (GFR = 30-44 mL/min/1 73 square meters)    Stage 4 Severe CKD (GFR = 15-29 mL/min/1 73 square meters)    Stage 5 End Stage CKD (GFR <15 mL/min/1 73 square meters)  Note: GFR calculation is accurate only with a steady state creatinine                 CT abdomen pelvis wo contrast   Final Result by Sharon Duque MD (05/09 2059)      No acute inflammatory process identified in the abdomen or pelvis  Workstation performed: [id number]                    Procedures  Procedures         ED Course                               SBIRT 22yo+      Most Recent Value   SBIRT (25 yo +)    In order to provide better care to our patients, we are screening all of our patients for alcohol and drug use  Would it be okay to ask you these screening questions? Yes Filed at: 05/09/2022 2130   Initial Alcohol Screen: US AUDIT-C     1  How often do you have a drink containing alcohol? 1 Filed at: 05/09/2022 2130   2  How many drinks containing alcohol do you have on a typical day you are drinking? 0 Filed at: 05/09/2022 2130   3a  Male UNDER 65: How often do you have five or more drinks on one occasion? 0 Filed at: 05/09/2022 2130   3b  FEMALE Any Age, or MALE 65+: How often do you have 4 or more drinks on one occassion? 0 Filed at: 05/09/2022 2130   Audit-C Score 1 Filed at: 05/09/2022 2130   HARVINDER: How many times in the past year have you    Used an illegal drug or used a prescription medication for non-medical reasons?  Never Filed at: 05/09/2022 2130                    MDM  Number of Diagnoses or Management Options  Abdominal pain: new and requires workup     Amount and/or Complexity of Data Reviewed  Clinical lab tests: reviewed and ordered  Decide to obtain previous medical records or to obtain history from someone other than the patient: yes  Review and summarize past medical records: yes    Patient Progress  Patient progress: stable      Disposition  Final diagnoses:   Abdominal pain     Time reflects when diagnosis was documented in both MDM as applicable and the Disposition within this note     Time User Action Codes Description Comment    5/9/2022 10:54 PM Pita Abreu Radha [R10 9] Abdominal pain       ED Disposition     ED Disposition Condition Date/Time Comment    Discharge Stable Mon May 9, 2022 10:54 PM Faraz Benoit discharge to home/self care              Follow-up Information     Follow up With Specialties Details Why Contact Info    Rolando Holliday MD Gastroenterology   [address]  [address]  [place] [id number] [phone]            Discharge Medication List as of 5/9/2022 10:55 PM      START taking these medications    Details   dicyclomine (BENTYL) 20 mg tablet Take 1 tablet (20 mg total) by mouth 2 (two) times a day, Starting Mon 5/9/2022, Normal      ondansetron (ZOFRAN-ODT) 4 mg disintegrating tablet Take 1 tablet (4 mg total) by mouth every 8 (eight) hours as needed for nausea or vomiting, Starting Mon 5/9/2022, Normal         CONTINUE these medications which have NOT CHANGED    Details   albuterol (2 5 mg/3 mL) 0 083 % nebulizer solution Take 6 mL (5 mg total) by nebulization every 6 (six) hours as needed for wheezing, Starting Mon 3/28/2022, Normal      clonazePAM (KlonoPIN) 1 mg tablet take 0 5 tablet by mouth UP TO THREE TIMES A DAY if needed for anxiety, Historical Med      DULoxetine (CYMBALTA) 60 mg delayed release capsule Take 60 mg by mouth daily, Starting Tue 12/1/2020, Historical Med      fexofenadine-pseudoephedrine (ALLEGRA-D)  MG per tablet Take 1 tablet by mouth 2 (two) times a day as needed for allergies (congestion), Starting Mon 3/28/2022, Normal      !! topiramate (TOPAMAX) 25 mg tablet Take 25 mg by mouth daily, Starting Sun 1/3/2021, Historical Med      !! topiramate (TOPAMAX) 50 MG tablet Take 50 mg by mouth daily, Starting Sun 1/3/2021, Historical Med      traZODone (DESYREL) 100 mg tablet take 1 TO 2 tablets by mouth AT NIGHT if needed for insomnia, Historical Med       !! - Potential duplicate medications found  Please discuss with provider  No discharge procedures on file      PDMP Review     None          ED Provider  Electronically Signed by           Heath Gilbert DO  05/10/22 8523

## 2022-05-10 LAB
ATRIAL RATE: 70 BPM
ATRIAL RATE: 75 BPM
P AXIS: 71 DEGREES
P AXIS: 72 DEGREES
PR INTERVAL: 158 MS
PR INTERVAL: 160 MS
QRS AXIS: 76 DEGREES
QRS AXIS: 76 DEGREES
QRSD INTERVAL: 74 MS
QRSD INTERVAL: 76 MS
QT INTERVAL: 402 MS
QT INTERVAL: 406 MS
QTC INTERVAL: 434 MS
QTC INTERVAL: 453 MS
T WAVE AXIS: 17 DEGREES
T WAVE AXIS: 19 DEGREES
VENTRICULAR RATE: 70 BPM
VENTRICULAR RATE: 75 BPM

## 2022-05-10 PROCEDURE — 93010 ELECTROCARDIOGRAM REPORT: CPT | Performed by: INTERNAL MEDICINE

## 2022-05-11 RX ORDER — CODEINE PHOSPHATE AND GUAIFENESIN 10; 100 MG/5ML; MG/5ML
SOLUTION ORAL
COMMUNITY
Start: 2022-03-23 | End: 2022-06-09

## 2022-05-11 RX ORDER — PREDNISONE 20 MG/1
TABLET ORAL
COMMUNITY
Start: 2022-03-20 | End: 2022-06-09

## 2022-05-11 RX ORDER — IBUPROFEN 800 MG/1
800 TABLET ORAL 3 TIMES DAILY
COMMUNITY
Start: 2022-03-20 | End: 2022-06-09

## 2022-05-11 RX ORDER — BENZONATATE 200 MG/1
200 CAPSULE ORAL 3 TIMES DAILY
COMMUNITY
Start: 2022-03-20 | End: 2022-06-09

## 2022-05-12 ENCOUNTER — OFFICE VISIT (OUTPATIENT)
Dept: INTERNAL MEDICINE CLINIC | Facility: CLINIC | Age: 47
End: 2022-05-12
Payer: COMMERCIAL

## 2022-05-12 VITALS
OXYGEN SATURATION: 98 % | BODY MASS INDEX: 32.14 KG/M2 | RESPIRATION RATE: 16 BRPM | SYSTOLIC BLOOD PRESSURE: 114 MMHG | DIASTOLIC BLOOD PRESSURE: 76 MMHG | HEART RATE: 90 BPM | WEIGHT: 170.1 LBS

## 2022-05-12 DIAGNOSIS — R10.9 ABDOMINAL PAIN: Primary | ICD-10-CM

## 2022-05-12 PROCEDURE — 99213 OFFICE O/P EST LOW 20 MIN: CPT

## 2022-05-12 RX ORDER — PANTOPRAZOLE SODIUM 40 MG/1
40 TABLET, DELAYED RELEASE ORAL DAILY
Qty: 30 TABLET | Refills: 2 | Status: SHIPPED | OUTPATIENT
Start: 2022-05-12 | End: 2022-07-24

## 2022-05-12 RX ORDER — ONDANSETRON 4 MG/1
4 TABLET, ORALLY DISINTEGRATING ORAL EVERY 8 HOURS PRN
Qty: 20 TABLET | Refills: 1 | Status: SHIPPED | OUTPATIENT
Start: 2022-05-12 | End: 2022-06-09

## 2022-05-12 RX ORDER — DICYCLOMINE HCL 20 MG
20 TABLET ORAL 2 TIMES DAILY
Qty: 40 TABLET | Refills: 1 | Status: SHIPPED | OUTPATIENT
Start: 2022-05-12 | End: 2022-07-25 | Stop reason: SDUPTHER

## 2022-05-12 NOTE — ASSESSMENT & PLAN NOTE
Patient with reports of nausea, vomiting, diarrhea abdominal pain greater than 1 week  Recent ED visit labs and CT scan are unremarkable  Patient encouraged to eat a bland diet over the weekend  She was counseled to avoid fried foods, caffeine, nicotine, alcohol, dairy  Continue Bentyl and Zofran p r n     Will add Protonix 40 mg daily  Encouraged patient to maintain hydration    If symptoms do not improve by Monday she was instructed to call office

## 2022-05-12 NOTE — PROGRESS NOTES
[facility]    NAME: Idalia Bob  AGE: 48 y o  SEX: female  : 1975     DATE: 2022     Assessment and Plan:     Problem List Items Addressed This Visit        Other    Abdominal pain - Primary     Patient with reports of nausea, vomiting, diarrhea abdominal pain greater than 1 week  Recent ED visit labs and CT scan are unremarkable  Patient encouraged to eat a bland diet over the weekend  She was counseled to avoid fried foods, caffeine, nicotine, alcohol, dairy  Continue Bentyl and Zofran p r n     Will add Protonix 40 mg daily  Encouraged patient to maintain hydration  If symptoms do not improve by Monday she was instructed to call office           Relevant Medications    dicyclomine (BENTYL) 20 mg tablet    ondansetron (ZOFRAN-ODT) 4 mg disintegrating tablet    pantoprazole (PROTONIX) 40 mg tablet              No follow-ups on file  Chief Complaint:     No chief complaint on file  History of Present Illness:     Anna Bernard presents for follow-up evaluation from ER visit 2 nights ago  She reports that she has had abdominal pain with diarrhea, nausea vomiting x1 week  She reports that she has gone from having diarrhea last week to feeling constipated  She states that she is unable to eat or drink  She has been using the dicyclomine and Zofran prescribed by the emergency department which has been helpful  She has a history of hysterectomy hernia repair  Blood work and CT scan in the emergency department were unremarkable  She did make a follow-up with Gastroenterology however cannot get an appointment until   Review of Systems:     Review of Systems   Constitutional: Positive for appetite change  Negative for chills and fever  HENT: Negative  Respiratory: Negative  Cardiovascular: Negative  Gastrointestinal: Positive for abdominal pain, constipation, diarrhea, nausea and vomiting  Genitourinary: Negative  Skin: Negative  Neurological: Negative  Problem List:     Patient Active Problem List   Diagnosis    Incisional hernia, without obstruction or gangrene    Fibromyalgia    Numbness    MDD (major depressive disorder), recurrent, severe, with psychosis ([facility] [address] )    Somatoform disorder, unspecified    Disc degeneration, lumbar    Constipation    Arthritis    Generalized anxiety disorder    Vitamin D deficiency    Obesity (BMI 30-39  9)    Cigarette nicotine dependence without complication    Ventral hernia    Hypotension    Chronic pain syndrome    Cough    Chronic bilateral thoracic back pain        Objective: There were no vitals taken for this visit  Physical Exam  Vitals and nursing note reviewed  Constitutional:       General: She is not in acute distress  Appearance: She is well-developed  She is obese  HENT:      Head: Normocephalic and atraumatic  Nose: Nose normal       Mouth/Throat:      Mouth: Mucous membranes are moist       Pharynx: Oropharynx is clear  Eyes:      Conjunctiva/sclera: Conjunctivae normal       Pupils: Pupils are equal, round, and reactive to light  Neck:      Thyroid: No thyromegaly  Cardiovascular:      Rate and Rhythm: Normal rate and regular rhythm  Pulses: Normal pulses  Heart sounds: Normal heart sounds  No murmur heard  Pulmonary:      Effort: Pulmonary effort is normal  No respiratory distress  Breath sounds: Normal breath sounds  Abdominal:      General: Bowel sounds are normal  There is no distension  Palpations: Abdomen is soft  Tenderness: There is generalized abdominal tenderness and tenderness in the right lower quadrant and left lower quadrant  Comments: Diffuse tenderness, worse over lower quadrants   Musculoskeletal:         General: Normal range of motion  Cervical back: Normal range of motion and neck supple  Lymphadenopathy:      Cervical: No cervical adenopathy     Skin:     General: Skin is warm and dry  Capillary Refill: Capillary refill takes less than 2 seconds  Neurological:      General: No focal deficit present  Mental Status: She is alert and oriented to person, place, and time  Psychiatric:         Mood and Affect: Mood normal          Behavior: Behavior normal          Thought Content: Thought content normal          Judgment: Judgment normal          I spent 15 minutes with this patient      [address] [place] [address]  [facility] [address] [place] [address]

## 2022-05-12 NOTE — PATIENT INSTRUCTIONS
Avoid fried foods, caffeine, nicotine, dairy this weekend  Stick to bland diet   Stay hydrated   Start Protonix 40mg daily

## 2022-05-29 ENCOUNTER — APPOINTMENT (EMERGENCY)
Dept: CT IMAGING | Facility: HOSPITAL | Age: 47
End: 2022-05-29
Payer: COMMERCIAL

## 2022-05-29 ENCOUNTER — HOSPITAL ENCOUNTER (EMERGENCY)
Facility: HOSPITAL | Age: 47
Discharge: HOME | End: 2022-05-30
Attending: EMERGENCY MEDICINE | Admitting: EMERGENCY MEDICINE
Payer: COMMERCIAL

## 2022-05-29 ENCOUNTER — APPOINTMENT (EMERGENCY)
Dept: RADIOLOGY | Facility: HOSPITAL | Age: 47
End: 2022-05-29
Payer: COMMERCIAL

## 2022-05-29 VITALS
RESPIRATION RATE: 14 BRPM | HEIGHT: 61 IN | HEART RATE: 72 BPM | WEIGHT: 160 LBS | SYSTOLIC BLOOD PRESSURE: 98 MMHG | OXYGEN SATURATION: 98 % | TEMPERATURE: 98.2 F | DIASTOLIC BLOOD PRESSURE: 63 MMHG | BODY MASS INDEX: 30.21 KG/M2

## 2022-05-29 DIAGNOSIS — R74.8 ELEVATED LIPASE: ICD-10-CM

## 2022-05-29 DIAGNOSIS — B37.0 ORAL THRUSH: ICD-10-CM

## 2022-05-29 DIAGNOSIS — R13.10 DYSPHAGIA: Primary | ICD-10-CM

## 2022-05-29 DIAGNOSIS — K20.90 ESOPHAGITIS: ICD-10-CM

## 2022-05-29 DIAGNOSIS — R13.10 ODYNOPHAGIA: ICD-10-CM

## 2022-05-29 LAB
ALBUMIN SERPL BCP-MCNC: 3.6 G/DL (ref 3.5–5)
ALP SERPL-CCNC: 88 U/L (ref 46–116)
ALT SERPL W P-5'-P-CCNC: 12 U/L (ref 12–78)
ANION GAP SERPL CALCULATED.3IONS-SCNC: 12 MMOL/L (ref 4–13)
AST SERPL W P-5'-P-CCNC: <5 U/L (ref 5–45)
BASOPHILS # BLD AUTO: 0.06 THOUSANDS/ΜL (ref 0–0.1)
BASOPHILS NFR BLD AUTO: 0 % (ref 0–1)
BILIRUB DIRECT SERPL-MCNC: 0.04 MG/DL (ref 0–0.2)
BILIRUB SERPL-MCNC: 0.21 MG/DL (ref 0.2–1)
BUN SERPL-MCNC: 9 MG/DL (ref 5–25)
CALCIUM SERPL-MCNC: 8.9 MG/DL (ref 8.3–10.1)
CARDIAC TROPONIN I PNL SERPL HS: <2 NG/L
CARDIAC TROPONIN I PNL SERPL HS: <2 NG/L
CHLORIDE SERPL-SCNC: 105 MMOL/L (ref 100–108)
CO2 SERPL-SCNC: 23 MMOL/L (ref 21–32)
CREAT SERPL-MCNC: 0.79 MG/DL (ref 0.6–1.3)
EOSINOPHIL # BLD AUTO: 0.16 THOUSAND/ΜL (ref 0–0.61)
EOSINOPHIL NFR BLD AUTO: 1 % (ref 0–6)
ERYTHROCYTE [DISTWIDTH] IN BLOOD BY AUTOMATED COUNT: 14.6 % (ref 11.6–15.1)
GFR SERPL CREATININE-BSD FRML MDRD: 88 ML/MIN/1.73SQ M
GLUCOSE SERPL-MCNC: 93 MG/DL (ref 65–140)
HCT VFR BLD AUTO: 42.4 % (ref 34.8–46.1)
HGB BLD-MCNC: 13.5 G/DL (ref 11.5–15.4)
IMM GRANULOCYTES # BLD AUTO: 0.05 THOUSAND/UL (ref 0–0.2)
IMM GRANULOCYTES NFR BLD AUTO: 0 % (ref 0–2)
LIPASE SERPL-CCNC: 653 U/L (ref 73–393)
LYMPHOCYTES # BLD AUTO: 3.66 THOUSANDS/ΜL (ref 0.6–4.47)
LYMPHOCYTES NFR BLD AUTO: 27 % (ref 14–44)
MAGNESIUM SERPL-MCNC: 2.5 MG/DL (ref 1.6–2.6)
MCH RBC QN AUTO: 29 PG (ref 26.8–34.3)
MCHC RBC AUTO-ENTMCNC: 31.8 G/DL (ref 31.4–37.4)
MCV RBC AUTO: 91 FL (ref 82–98)
MONOCYTES # BLD AUTO: 0.64 THOUSAND/ΜL (ref 0.17–1.22)
MONOCYTES NFR BLD AUTO: 5 % (ref 4–12)
NEUTROPHILS # BLD AUTO: 8.9 THOUSANDS/ΜL (ref 1.85–7.62)
NEUTS SEG NFR BLD AUTO: 67 % (ref 43–75)
NRBC BLD AUTO-RTO: 0 /100 WBCS
PLATELET # BLD AUTO: 314 THOUSANDS/UL (ref 149–390)
PMV BLD AUTO: 10.2 FL (ref 8.9–12.7)
POTASSIUM SERPL-SCNC: 3.5 MMOL/L (ref 3.5–5.3)
PROT SERPL-MCNC: 7.1 G/DL (ref 6.4–8.2)
RBC # BLD AUTO: 4.65 MILLION/UL (ref 3.81–5.12)
SODIUM SERPL-SCNC: 140 MMOL/L (ref 136–145)
WBC # BLD AUTO: 13.47 THOUSAND/UL (ref 4.31–10.16)

## 2022-05-29 PROCEDURE — 83735 ASSAY OF MAGNESIUM: CPT | Performed by: EMERGENCY MEDICINE

## 2022-05-29 PROCEDURE — 80048 BASIC METABOLIC PNL TOTAL CA: CPT | Performed by: EMERGENCY MEDICINE

## 2022-05-29 PROCEDURE — 83690 ASSAY OF LIPASE: CPT | Performed by: EMERGENCY MEDICINE

## 2022-05-29 PROCEDURE — G1004 CDSM NDSC: HCPCS

## 2022-05-29 PROCEDURE — 70490 CT SOFT TISSUE NECK W/O DYE: CPT

## 2022-05-29 PROCEDURE — 71046 X-RAY EXAM CHEST 2 VIEWS: CPT

## 2022-05-29 PROCEDURE — 96374 THER/PROPH/DIAG INJ IV PUSH: CPT

## 2022-05-29 PROCEDURE — 93005 ELECTROCARDIOGRAM TRACING: CPT

## 2022-05-29 PROCEDURE — 85025 COMPLETE CBC W/AUTO DIFF WBC: CPT | Performed by: EMERGENCY MEDICINE

## 2022-05-29 PROCEDURE — 96361 HYDRATE IV INFUSION ADD-ON: CPT

## 2022-05-29 PROCEDURE — 99285 EMERGENCY DEPT VISIT HI MDM: CPT | Performed by: EMERGENCY MEDICINE

## 2022-05-29 PROCEDURE — 80076 HEPATIC FUNCTION PANEL: CPT | Performed by: EMERGENCY MEDICINE

## 2022-05-29 PROCEDURE — 99284 EMERGENCY DEPT VISIT MOD MDM: CPT

## 2022-05-29 PROCEDURE — 36415 COLL VENOUS BLD VENIPUNCTURE: CPT | Performed by: EMERGENCY MEDICINE

## 2022-05-29 PROCEDURE — 74176 CT ABD & PELVIS W/O CONTRAST: CPT

## 2022-05-29 PROCEDURE — 84484 ASSAY OF TROPONIN QUANT: CPT | Performed by: EMERGENCY MEDICINE

## 2022-05-29 RX ORDER — LIDOCAINE HYDROCHLORIDE 20 MG/ML
10 SOLUTION OROPHARYNGEAL ONCE
Status: COMPLETED | OUTPATIENT
Start: 2022-05-29 | End: 2022-05-29

## 2022-05-29 RX ORDER — ONDANSETRON 2 MG/ML
4 INJECTION INTRAMUSCULAR; INTRAVENOUS ONCE
Status: COMPLETED | OUTPATIENT
Start: 2022-05-29 | End: 2022-05-29

## 2022-05-29 RX ORDER — MAGNESIUM HYDROXIDE/ALUMINUM HYDROXICE/SIMETHICONE 120; 1200; 1200 MG/30ML; MG/30ML; MG/30ML
30 SUSPENSION ORAL ONCE
Status: COMPLETED | OUTPATIENT
Start: 2022-05-29 | End: 2022-05-29

## 2022-05-29 RX ADMIN — ALUMINUM HYDROXIDE, MAGNESIUM HYDROXIDE, AND SIMETHICONE 30 ML: 200; 200; 20 SUSPENSION ORAL at 21:40

## 2022-05-29 RX ADMIN — ONDANSETRON 4 MG: 2 INJECTION INTRAMUSCULAR; INTRAVENOUS at 21:37

## 2022-05-29 RX ADMIN — LIDOCAINE HYDROCHLORIDE 10 ML: 20 SOLUTION ORAL; TOPICAL at 21:38

## 2022-05-29 RX ADMIN — SODIUM CHLORIDE 1000 ML: 0.9 INJECTION, SOLUTION INTRAVENOUS at 21:40

## 2022-05-30 RX ORDER — SUCRALFATE 1 G/1
1 TABLET ORAL
Qty: 20 TABLET | Refills: 0 | Status: SHIPPED | OUTPATIENT
Start: 2022-05-30 | End: 2022-06-09

## 2022-05-30 NOTE — ED PROVIDER NOTES
History  Chief Complaint   Patient presents with    Difficulty Swallowing     Pt c/o difficulty swallowing food and liquid x 2 days, pt states it feels like it stops mid chest      Patient is a 47-year-old female with past medical history of depression, chronic abdominal pain, presents to the emergency department complaining of 2 days of difficulty and painful swallowing  Patient states that she woke up Saturday morning with pain in her throat and difficulty swallowing  She states it is very painful when she swallows and she feels a sharp chest pain when swallowing and feels as though something is getting stuck in her lower chest   She states this occurs with both food and liquid  Patient admits that 1-2 days prior to this starting she was seen at urgent care for white in her throat and on her tongue and was diagnosed with oral thrush and started on nystatin solution which she continues to take  She states that the chest pain is sharp and comes and goes  She states it occurs even when she is not swallowing or drinking/eating  She states she has been nauseated but has not vomited  She does feel some sensation of throat tightness and closing  Denies any dyspnea, fever, chills, cough, URI symptoms, neck swelling or redness, palpitations, worsening of her chronic abdominal pain, vomiting, diarrhea, constipation, blood per rectum or melena, urinary symptoms, skin rash or color change, lateralizing weakness or extremity paresthesia or other focal neurologic deficits  Patient reports that she has a GI appointment on 6/9 for her abdominal pain issues which are ongoing  She denies ever having this type of swallowing problem in the past       History provided by:  Patient   used: No        Prior to Admission Medications   Prescriptions Last Dose Informant Patient Reported? Taking?    DULoxetine (CYMBALTA) 60 mg delayed release capsule  Self Yes No   Sig: Take 60 mg by mouth daily   albuterol (2 5 mg/3 mL) 0 083 % nebulizer solution   No No   Sig: Take 6 mL (5 mg total) by nebulization every 6 (six) hours as needed for wheezing   benzonatate (TESSALON) 200 MG capsule   Yes No   Sig: Take 200 mg by mouth in the morning and 200 mg in the evening and 200 mg before bedtime  clonazePAM (KlonoPIN) 1 mg tablet  Self Yes No   Sig: take 0 5 tablet by mouth UP TO THREE TIMES A DAY if needed for anxiety   dicyclomine (BENTYL) 20 mg tablet   No No   Sig: Take 1 tablet (20 mg total) by mouth in the morning and 1 tablet (20 mg total) in the evening  fexofenadine-pseudoephedrine (ALLEGRA-D)  MG per tablet   No No   Sig: Take 1 tablet by mouth 2 (two) times a day as needed for allergies (congestion)   guaiFENesin-codeine (ROBITUSSIN AC) 100-10 mg/5 mL oral solution   Yes No   Sig: take 10 milliliters (2 TEASPOONFULS) by mouth at bedtime   ibuprofen (MOTRIN) 800 mg tablet   Yes No   Sig: Take 800 mg by mouth in the morning and 800 mg in the evening and 800 mg before bedtime  ondansetron (ZOFRAN-ODT) 4 mg disintegrating tablet   No No   Sig: Take 1 tablet (4 mg total) by mouth every 8 (eight) hours as needed for nausea or vomiting   pantoprazole (PROTONIX) 40 mg tablet   No No   Sig: Take 1 tablet (40 mg total) by mouth in the morning     predniSONE 20 mg tablet   Yes No   Sig: take 3 tablets by mouth on day 1 then 2 tablets daily on day 2 to 5   topiramate (TOPAMAX) 25 mg tablet  Self Yes No   Sig: Take 25 mg by mouth daily   topiramate (TOPAMAX) 50 MG tablet  Self Yes No   Sig: Take 50 mg by mouth daily   traZODone (DESYREL) 100 mg tablet  Self Yes No   Sig: take 1 TO 2 tablets by mouth AT NIGHT if needed for insomnia      Facility-Administered Medications: None       Past Medical History:   Diagnosis Date    Bladder perforation, intraoperative     Depression     Intestine, perforation (HCC)     intra op    Sepsis ([facility] [address] )        Past Surgical History:   Procedure Laterality Date    ABDOMINAL SURGERY      BLADDER SURGERY       SECTION      HYSTERECTOMY      INCISIONAL HERNIA REPAIR N/A 3/22/2021    Procedure: OPEN REPAIR VENTRAL/INCISIONAL HERNIA W/MESH;  Surgeon: Balbina Silva MD;  Location: [facility];  Service: General       Family History   Problem Relation Age of Onset    Cancer Father      I have reviewed and agree with the history as documented  E-Cigarette/Vaping    E-Cigarette Use Never User      E-Cigarette/Vaping Substances    Nicotine No     THC No     CBD No     Flavoring No     Other No     Unknown No      Social History     Tobacco Use    Smoking status: Current Every Day Smoker     Packs/day: 0 50    Smokeless tobacco: Never Used   Vaping Use    Vaping Use: Never used   Substance Use Topics    Alcohol use: Not Currently    Drug use: Never       Review of Systems   Constitutional: Negative for chills and fever  HENT: Positive for sore throat and trouble swallowing  Negative for congestion, drooling, ear pain, rhinorrhea and voice change  Respiratory: Negative for cough, chest tightness, shortness of breath and wheezing  Cardiovascular: Positive for chest pain  Negative for palpitations  Gastrointestinal: Positive for abdominal pain and nausea  Negative for abdominal distention, blood in stool, constipation, diarrhea and vomiting  Genitourinary: Negative for dysuria, flank pain, frequency and hematuria  Musculoskeletal: Negative for back pain, neck pain and neck stiffness  Skin: Negative for color change, pallor, rash and wound  Allergic/Immunologic: Negative for immunocompromised state  Neurological: Negative for dizziness, syncope, weakness, light-headedness, numbness and headaches  Hematological: Negative for adenopathy  Psychiatric/Behavioral: Negative for confusion and decreased concentration  All other systems reviewed and are negative  Physical Exam  Physical Exam  Vitals and nursing note reviewed     Constitutional:       General: She is not in acute distress  Appearance: Normal appearance  She is well-developed  She is not ill-appearing, toxic-appearing or diaphoretic  HENT:      Head: Normocephalic and atraumatic  Right Ear: External ear normal       Left Ear: External ear normal       Nose: Nose normal       Mouth/Throat:      Mouth: Mucous membranes are moist       Pharynx: Oropharynx is clear  Comments: Minimal oral thrush noted on the tongue  No thrush noted in the posterior oropharynx  Uvula midline  No tonsillar hypertrophy or exudate  Patient speaking, swallowing and handling secretions without difficulty  Eyes:      Extraocular Movements: Extraocular movements intact  Conjunctiva/sclera: Conjunctivae normal    Neck:      Vascular: No JVD  Cardiovascular:      Rate and Rhythm: Normal rate and regular rhythm  Pulses: Normal pulses  Heart sounds: Normal heart sounds  No murmur heard  No friction rub  No gallop  Pulmonary:      Effort: Pulmonary effort is normal  No respiratory distress  Breath sounds: Normal breath sounds  No wheezing, rhonchi or rales  Abdominal:      General: There is no distension  Palpations: Abdomen is soft  Tenderness: There is no abdominal tenderness  There is no guarding or rebound  Musculoskeletal:         General: No swelling or tenderness  Normal range of motion  Cervical back: Normal range of motion and neck supple  No rigidity or tenderness  Lymphadenopathy:      Cervical: No cervical adenopathy  Skin:     General: Skin is warm and dry  Coloration: Skin is not pale  Findings: No erythema or rash  Neurological:      General: No focal deficit present  Mental Status: She is alert and oriented to person, place, and time  Sensory: No sensory deficit  Motor: No weakness     Psychiatric:         Mood and Affect: Mood normal          Behavior: Behavior normal          Vital Signs  ED Triage Vitals [05/29/22 7139] Temperature Pulse Respirations Blood Pressure SpO2   98 2 °F (36 8 °C) 95 17 117/75 99 %      Temp Source Heart Rate Source Patient Position - Orthostatic VS BP Location FiO2 (%)   Temporal Monitor Sitting Left arm --      Pain Score       --           Vitals:    05/29/22 1829 05/29/22 2045 05/29/22 2230 05/29/22 2300   BP: 117/75 103/66 102/55 98/63   Pulse: 95 85 70 72   Patient Position - Orthostatic VS: Sitting Sitting Sitting Sitting     Vitals:    05/29/22 1829 05/29/22 2045 05/29/22 2230 05/29/22 2300   BP: 117/75 103/66 102/55 98/63   BP Location: Left arm Left arm Left arm Left arm   Pulse: 95 85 70 72   Resp: 17 16 20 14   Temp: 98 2 °F (36 8 °C)      TempSrc: Temporal      SpO2: 99% 95% 100% 98%   Weight: 72 6 kg (160 lb)      Height: 5' 1" (1 549 m)          Visual Acuity      ED Medications  Medications   sodium chloride 0 9 % bolus 1,000 mL (0 mL Intravenous Stopped 5/29/22 2318)   ondansetron (ZOFRAN) injection 4 mg (4 mg Intravenous Given 5/29/22 2137)   Lidocaine Viscous HCl (XYLOCAINE) 2 % mucosal solution 10 mL (10 mL Swish & Swallow Given 5/29/22 2138)   aluminum-magnesium hydroxide-simethicone (MYLANTA) oral suspension 30 mL (30 mL Oral Given 5/29/22 2140)       Diagnostic Studies  Results Reviewed     Procedure Component Value Units Date/Time    HS Troponin I 2hr [[id number]] Collected: 05/29/22 2318    Lab Status: Final result Specimen: Blood from Arm, Right Updated: 05/29/22 2352     hs TnI 2hr <2 ng/L      Delta 2hr hsTnI --    Hepatic function panel [[id number]]  (Abnormal) Collected: 05/29/22 2140    Lab Status: Final result Specimen: Blood from Arm, Right Updated: 05/29/22 2218     Total Bilirubin 0 21 mg/dL      Bilirubin, Direct 0 04 mg/dL      Alkaline Phosphatase 88 U/L      AST <5 U/L      ALT 12 U/L      Total Protein 7 1 g/dL      Albumin 3 6 g/dL     HS Troponin 0hr (reflex protocol) [[id number]]  (Normal) Collected: 05/29/22 1123    Lab Status: Final result Specimen: Blood from Arm, Right Updated: 05/29/22 2213     hs TnI 0hr <2 ng/L     Basic metabolic panel [[id number]] Collected: 05/29/22 2140    Lab Status: Final result Specimen: Blood from Arm, Right Updated: 05/29/22 2208     Sodium 140 mmol/L      Potassium 3 5 mmol/L      Chloride 105 mmol/L      CO2 23 mmol/L      ANION GAP 12 mmol/L      BUN 9 mg/dL      Creatinine 0 79 mg/dL      Glucose 93 mg/dL      Calcium 8 9 mg/dL      eGFR 88 ml/min/1 73sq m     Narrative:      [place] guidelines for Chronic Kidney Disease (CKD):     Stage 1 with normal or high GFR (GFR > 90 mL/min/1 73 square meters)    Stage 2 Mild CKD (GFR = 60-89 mL/min/1 73 square meters)    Stage 3A Moderate CKD (GFR = 45-59 mL/min/1 73 square meters)    Stage 3B Moderate CKD (GFR = 30-44 mL/min/1 73 square meters)    Stage 4 Severe CKD (GFR = 15-29 mL/min/1 73 square meters)    Stage 5 End Stage CKD (GFR <15 mL/min/1 73 square meters)  Note: GFR calculation is accurate only with a steady state creatinine    Lipase [[id number]]  (Abnormal) Collected: 05/29/22 2140    Lab Status: Final result Specimen: Blood from Arm, Right Updated: 05/29/22 2208     Lipase 653 u/L     Magnesium [[id number]]  (Normal) Collected: 05/29/22 2140    Lab Status: Final result Specimen: Blood from Arm, Right Updated: 05/29/22 2208     Magnesium 2 5 mg/dL     CBC and differential [[id number]]  (Abnormal) Collected: 05/29/22 2140    Lab Status: Final result Specimen: Blood from Arm, Right Updated: 05/29/22 2145     WBC 13 47 Thousand/uL      RBC 4 65 Million/uL      Hemoglobin 13 5 g/dL      Hematocrit 42 4 %      MCV 91 fL      MCH 29 0 pg      MCHC 31 8 g/dL      RDW 14 6 %      MPV 10 2 fL      Platelets 741 Thousands/uL      nRBC 0 /100 WBCs      Neutrophils Relative 67 %      Immat GRANS % 0 %      Lymphocytes Relative 27 %      Monocytes Relative 5 %      Eosinophils Relative 1 %      Basophils Relative 0 %      Neutrophils Absolute 8 90 Thousands/µL Immature Grans Absolute 0 05 Thousand/uL      Lymphocytes Absolute 3 66 Thousands/µL      Monocytes Absolute 0 64 Thousand/µL      Eosinophils Absolute 0 16 Thousand/µL      Basophils Absolute 0 06 Thousands/µL                  CT abdomen pelvis wo contrast   Final Result by Sara Walden MD (05/29 2334)      No acute intra-abdominal abnormality  No free air or free fluid  Small amount of liquid stool noted throughout the colon which may be related to a gastroenteritis/diarrhea  No evidence of large or small bowel obstruction  Although the patient has abnormally elevated serum lipase levels, there is no peripancreatic inflammatory stranding  Workstation performed: [id number]         CT soft tissue neck wo contrast   Final Result by Isiah Erickson MD (05/29 2222)      No CT findings to account for the patient's symptoms  No evidence of cervical mass, collection, or pathologic lymphadenopathy  Workstation performed: [id number]         XR chest 2 views   ED Interpretation by Steven Mercedes DO (05/29 2244)   No acute abnormality in the chest                  Procedures  ECG 12 Lead Documentation Only    Date/Time: 5/29/2022 10:08 PM  Performed by: Steven Mercedes DO  Authorized by: Steven Mercedes DO     ECG reviewed by me, the ED Provider: yes    Patient location:  ED  Previous ECG:     Previous ECG:  Compared to current    Comparison ECG info:  5-9-22    Similarity:  No change  Rate:     ECG rate:  73    ECG rate assessment: normal    Rhythm:     Rhythm: sinus rhythm    Ectopy:     Ectopy: none    QRS:     QRS axis:  Normal    QRS intervals:  Normal  Conduction:     Conduction: normal    ST segments:     ST segments:  Normal  T waves:     T waves: normal               ED Course  ED Course as of 05/30/22 0635   Sun May 29, 2022   2214 hs TnI 0hr: <2   2214 Lipase(!): 653  Patient reports chronic abdominal issues    She does not have any abdominal tenderness on examination  Mon May 30, 2022   0009 CT scan of the abdomen and pelvis is unremarkable without any CT evidence of pancreatitis  Patient tolerated the GI cocktail without any vomiting  Will likely discharge to have her follow-up with GI  Will give script for Magic mouthwash and Carafate  Advised her to continue the nystatin solution  Discussed ED return parameters  HEART Risk Score    Flowsheet Row Most Recent Value   Heart Score Risk Calculator    History 0 Filed at: 05/30/2022 0634   ECG 0 Filed at: 05/30/2022 2227   Age 1 Filed at: 05/30/2022 2960   Risk Factors 1 Filed at: 05/30/2022 0634   Troponin 0 Filed at: 05/30/2022 9666   HEART Score 2 Filed at: 05/30/2022 1746                    MDM  Number of Diagnoses or Management Options  Diagnosis management comments: 57-year-old female presents to the ED with 2-3 days of painful and difficulty swallowing  Patient is not actually regurgitating any food or fluids but reports it feels as though it is getting stuck in her chest   She also reports a sharp chest pain associated with this  Most likely this is related to oral thrush and likely esophageal thrush  Other considerations including GERD, impacted food bolus, esophageal motility disorder also considered  Patient is not having any difficulty breathing and is speaking and handling oral secretions without difficulty  Will give viscous lidocaine and Maalox for symptom relief  Will check cardiac labs and EKG  Will most likely recommend she keep her GI appointment on 06/09  I did discuss that this is likely related to the thrush and she should continue taking the nystatin solution as prescribed until she follows up with GI         Amount and/or Complexity of Data Reviewed  Clinical lab tests: ordered and reviewed  Tests in the radiology section of CPT®: ordered and reviewed  Tests in the medicine section of CPT®: reviewed and ordered  Independent visualization of images, tracings, or specimens: yes        Disposition  Final diagnoses:   Dysphagia   Odynophagia   Oral thrush   Elevated lipase   Esophagitis     Time reflects when diagnosis was documented in both MDM as applicable and the Disposition within this note     Time User Action Codes Description Comment    5/30/2022 12:16 AM Yazmin Calk E Add [R13 10] Dysphagia     5/30/2022 12:16 AM Yazmin Calk E Add [R13 10] Odynophagia     5/30/2022 12:16 AM Yazmin Calk E Add [B37 0] Oral thrush     5/30/2022 12:16 AM Yazmin Calk E Add [R74 8] Elevated lipase     5/30/2022 12:19 AM Yazmin Calk E Add [E66 9] Obesity (BMI 30-39  9)     5/30/2022 12:20 AM Yazmin Calk E Remove [E66 9] Obesity (BMI 30-39  9)     5/30/2022 12:20 AM Gwynneth Passer Add [K20 90] Esophagitis       ED Disposition     ED Disposition   Discharge    Condition   Stable    Date/Time   Mon May 30, 2022 12:16 AM    Comment   My Hernandes discharge to home/self care                 Follow-up Information     Follow up With Specialties Details Why Contact Info Additional Information    Matt Romano DO Family Medicine Schedule an appointment as soon as possible for a visit   [address] [address]  [phone]       GI doctor  Go on 6/9/2022       [facility] Gastroenterology Schedule an appointment as soon as possible for a visit   [address]  [address] [facility] [address]  [address] [facility], [address]  [address] [facility], [address], [facility], [place], [address] [facility] Emergency Department Emergency Medicine Go to  If symptoms worsen [address] Emergency Department, [address] [place], [place], [address]          Discharge Medication List as of 5/30/2022 12:21 AM      START taking these medications    Details al mag oxide-diphenhydramine-lidocaine viscous (MAGIC MOUTHWASH) 1:1:1 suspension Swish and swallow 10 mL every 6 (six) hours as needed for mouth pain or discomfort, Starting Mon 5/30/2022, Print      sucralfate (CARAFATE) 1 g tablet Take 1 tablet (1 g total) by mouth 4 (four) times a day (before meals and at bedtime), Starting Mon 5/30/2022, Print         CONTINUE these medications which have NOT CHANGED    Details   albuterol (2 5 mg/3 mL) 0 083 % nebulizer solution Take 6 mL (5 mg total) by nebulization every 6 (six) hours as needed for wheezing, Starting Mon 3/28/2022, Normal      benzonatate (TESSALON) 200 MG capsule Take 200 mg by mouth in the morning and 200 mg in the evening and 200 mg before bedtime  , Starting Sun 3/20/2022, Historical Med      clonazePAM (KlonoPIN) 1 mg tablet take 0 5 tablet by mouth UP TO THREE TIMES A DAY if needed for anxiety, Historical Med      dicyclomine (BENTYL) 20 mg tablet Take 1 tablet (20 mg total) by mouth in the morning and 1 tablet (20 mg total) in the evening , Starting Thu 5/12/2022, Normal      DULoxetine (CYMBALTA) 60 mg delayed release capsule Take 60 mg by mouth daily, Starting Tue 12/1/2020, Historical Med      fexofenadine-pseudoephedrine (ALLEGRA-D)  MG per tablet Take 1 tablet by mouth 2 (two) times a day as needed for allergies (congestion), Starting Mon 3/28/2022, Normal      guaiFENesin-codeine (ROBITUSSIN AC) 100-10 mg/5 mL oral solution take 10 milliliters (2 TEASPOONFULS) by mouth at bedtime, Historical Med      ibuprofen (MOTRIN) 800 mg tablet Take 800 mg by mouth in the morning and 800 mg in the evening and 800 mg before bedtime  , Starting Sun 3/20/2022, Historical Med      ondansetron (ZOFRAN-ODT) 4 mg disintegrating tablet Take 1 tablet (4 mg total) by mouth every 8 (eight) hours as needed for nausea or vomiting, Starting Thu 5/12/2022, Normal      pantoprazole (PROTONIX) 40 mg tablet Take 1 tablet (40 mg total) by mouth in the morning , Starting Thu 5/12/2022, Normal      predniSONE 20 mg tablet take 3 tablets by mouth on day 1 then 2 tablets daily on day 2 to 5, Historical Med      !! topiramate (TOPAMAX) 25 mg tablet Take 25 mg by mouth daily, Starting Sun 1/3/2021, Historical Med      !! topiramate (TOPAMAX) 50 MG tablet Take 50 mg by mouth daily, Starting Sun 1/3/2021, Historical Med      traZODone (DESYREL) 100 mg tablet take 1 TO 2 tablets by mouth AT NIGHT if needed for insomnia, Historical Med       !! - Potential duplicate medications found  Please discuss with provider  No discharge procedures on file      PDMP Review     None          ED Provider  Electronically Signed by           Licha Stephens DO  05/30/22 300 [facility] DO Francine  05/30/22 4925

## 2022-06-03 LAB
ATRIAL RATE: 73 BPM
P AXIS: 63 DEGREES
PR INTERVAL: 164 MS
QRS AXIS: 65 DEGREES
QRSD INTERVAL: 70 MS
QT INTERVAL: 372 MS
QTC INTERVAL: 409 MS
T WAVE AXIS: 11 DEGREES
VENTRICULAR RATE: 73 BPM

## 2022-06-03 PROCEDURE — 93010 ELECTROCARDIOGRAM REPORT: CPT | Performed by: INTERNAL MEDICINE

## 2022-06-08 RX ORDER — DULOXETIN HYDROCHLORIDE 30 MG/1
CAPSULE, DELAYED RELEASE ORAL
COMMUNITY
Start: 2022-04-13 | End: 2022-06-09

## 2022-06-08 RX ORDER — ONDANSETRON 4 MG/1
4 TABLET, FILM COATED ORAL 4 TIMES DAILY
COMMUNITY
Start: 2022-05-05 | End: 2022-06-09

## 2022-06-08 RX ORDER — VENLAFAXINE HYDROCHLORIDE 150 MG/1
CAPSULE, EXTENDED RELEASE ORAL
COMMUNITY
Start: 2022-05-10

## 2022-06-09 ENCOUNTER — OFFICE VISIT (OUTPATIENT)
Dept: GASTROENTEROLOGY | Facility: CLINIC | Age: 47
End: 2022-06-09
Payer: COMMERCIAL

## 2022-06-09 VITALS
OXYGEN SATURATION: 100 % | WEIGHT: 170 LBS | SYSTOLIC BLOOD PRESSURE: 102 MMHG | DIASTOLIC BLOOD PRESSURE: 78 MMHG | HEART RATE: 94 BPM | HEIGHT: 61 IN | BODY MASS INDEX: 32.1 KG/M2

## 2022-06-09 DIAGNOSIS — K20.80 FUNGAL ESOPHAGITIS: ICD-10-CM

## 2022-06-09 DIAGNOSIS — K58.0 IRRITABLE BOWEL SYNDROME WITH DIARRHEA: Primary | ICD-10-CM

## 2022-06-09 DIAGNOSIS — B49 FUNGAL ESOPHAGITIS: ICD-10-CM

## 2022-06-09 DIAGNOSIS — R13.10 ODYNOPHAGIA: ICD-10-CM

## 2022-06-09 PROCEDURE — 99213 OFFICE O/P EST LOW 20 MIN: CPT | Performed by: PHYSICIAN ASSISTANT

## 2022-06-09 RX ORDER — VENLAFAXINE HYDROCHLORIDE 75 MG/1
CAPSULE, EXTENDED RELEASE ORAL
COMMUNITY
Start: 2022-06-01

## 2022-06-09 RX ORDER — ZOLPIDEM TARTRATE 10 MG/1
TABLET ORAL
COMMUNITY
Start: 2022-06-07

## 2022-06-09 RX ORDER — LORAZEPAM 1 MG/1
1 TABLET ORAL EVERY 8 HOURS PRN
COMMUNITY

## 2022-06-09 RX ORDER — FLUCONAZOLE 100 MG/1
100 TABLET ORAL DAILY
Qty: 7 TABLET | Refills: 0 | Status: SHIPPED | OUTPATIENT
Start: 2022-06-09 | End: 2022-06-16

## 2022-06-09 RX ORDER — HYDROXYZINE PAMOATE 25 MG/1
CAPSULE ORAL
COMMUNITY
Start: 2022-05-27

## 2022-06-09 NOTE — PATIENT INSTRUCTIONS
Scheduled date of EGD(as of today):6/29/22  Physician performing EGD:Remberto  Location of EGD:[place]  Instructions reviewed with patient by:Yamilex LING  Clearances:  none

## 2022-06-09 NOTE — PROGRESS NOTES
[facility] - Outpatient Follow-up Note  Filomena Chin 48 y o  female MRN: [id number]  Encounter: [id number]          ASSESSMENT AND PLAN:      1  Irritable bowel syndrome with diarrhea  She has always tended towards constipation but recently notes bouts of severe diarrhea  Start fiber and probiotic  Trial of Xifaxan   She is under a great deal of stress and struggling with anxiety and insomnia  Many of her psychiatric meds are being changed which may be exacerbating these symptoms    2  Fungal esophagitis  3  Odynophagia  She was diagnosed with thrush during a recent ER visit and is on magic mouthwash  This has helped her oral pain but she still notes throat and chest pain with swallowing  Will plan EGD  Will give a 7 day fluconazole course    ______________________________________________________________________    SUBJECTIVE:  55-year-old female presents for evaluation of altering diarrhea and constipation as well as odynophagia  She notes that over the past few weeks she has been fluctuating between severe constipation and bouts of severe diarrhea  Although she has suffered with constipation past it is more severe than it ever has been  She has never really had issues with diarrhea  She has been in the emergency room the symptoms with normal testing including a CT scan  She admits that over the past few weeks she has been under a great deal of stress  Many of her psych meds are being changed by her psychiatrist   She is struggling with insomnia  She also notes issues with dysphagia and odynophagia  Her most recent emergency room visit was for this problem  She was diagnosed with thrush and given magic mouthwash  Although this helped any oral pain that she was having a did not help her swallowing  She is taking pantoprazole 40 mg once daily  She does not remember when her last upper endoscopy was    She had a colonoscopy last year with sessile serrated and hyperplastic polyp removed  REVIEW OF SYSTEMS IS OTHERWISE NEGATIVE  Historical Information   Past Medical History:   Diagnosis Date    Bladder perforation, intraoperative     Depression     Intestine, perforation ([facility] [address] )     intra op    Sepsis ([facility] [address] )      Past Surgical History:   Procedure Laterality Date    ABDOMINAL SURGERY      BLADDER SURGERY       SECTION      HYSTERECTOMY      INCISIONAL HERNIA REPAIR N/A 3/22/2021    Procedure: OPEN REPAIR VENTRAL/INCISIONAL HERNIA W/MESH;  Surgeon: Kirk Tomlinson MD;  Location: [facility];  Service: General     Social History   Social History     Substance and Sexual Activity   Alcohol Use Not Currently     Social History     Substance and Sexual Activity   Drug Use Never     Social History     Tobacco Use   Smoking Status Current Every Day Smoker    Packs/day: 0 50   Smokeless Tobacco Never Used     Family History   Problem Relation Age of Onset    Cancer Father        Meds/Allergies       Current Outpatient Medications:     al mag oxide-diphenhydramine-lidocaine viscous (MAGIC MOUTHWASH) 1:1:1 suspension    albuterol (2 5 mg/3 mL) 0 083 % nebulizer solution    dicyclomine (BENTYL) 20 mg tablet    fexofenadine-pseudoephedrine (ALLEGRA-D)  MG per tablet    fluconazole (DIFLUCAN) 100 mg tablet    hydrOXYzine pamoate (VISTARIL) 25 mg capsule    LORazepam (ATIVAN) 1 mg tablet    nystatin (MYCOSTATIN) 500,000 units/5 mL suspension    pantoprazole (PROTONIX) 40 mg tablet    rifaximin (XIFAXAN) 550 mg tablet    venlafaxine (EFFEXOR-XR) 150 mg 24 hr capsule    venlafaxine (EFFEXOR-XR) 75 mg 24 hr capsule    zolpidem (AMBIEN) 10 mg tablet    No Known Allergies        Objective     Blood pressure 102/78, pulse 94, height 5' 1" (1 549 m), weight 77 1 kg (170 lb), SpO2 100 %, not currently breastfeeding  Body mass index is 32 12 kg/m²        PHYSICAL EXAM:      General Appearance:   Alert, cooperative, no distress   HEENT:   Normocephalic, atraumatic, anicteric      Neck:  Supple, symmetrical, trachea midline   Lungs:   Clear to auscultation bilaterally; no rales, rhonchi or wheezing; respirations unlabored    Heart[de-identified]   Regular rate and rhythm; no murmur, rub, or gallop  Abdomen:   Soft, non-tender, non-distended; normal bowel sounds; no masses, no organomegaly    Genitalia:   Deferred    Rectal:   Deferred    Extremities:  No cyanosis, clubbing or edema    Pulses:  2+ and symmetric    Skin:  No jaundice, rashes, or lesions    Lymph nodes:  No palpable cervical lymphadenopathy        Lab Results:   No visits with results within 1 Day(s) from this visit     Latest known visit with results is:   Admission on 05/29/2022, Discharged on 05/30/2022   Component Date Value    WBC 05/29/2022 13 47 (A)    RBC 05/29/2022 4 65     Hemoglobin 05/29/2022 13 5     Hematocrit 05/29/2022 42 4     MCV 05/29/2022 91     MCH 05/29/2022 29 0     MCHC 05/29/2022 31 8     RDW 05/29/2022 14 6     MPV 05/29/2022 10 2     Platelets 83/38/9825 314     nRBC 05/29/2022 0     Neutrophils Relative 05/29/2022 67     Immat GRANS % 05/29/2022 0     Lymphocytes Relative 05/29/2022 27     Monocytes Relative 05/29/2022 5     Eosinophils Relative 05/29/2022 1     Basophils Relative 05/29/2022 0     Neutrophils Absolute 05/29/2022 8 90 (A)    Immature Grans Absolute 05/29/2022 0 05     Lymphocytes Absolute 05/29/2022 3 66     Monocytes Absolute 05/29/2022 0 64     Eosinophils Absolute 05/29/2022 0 16     Basophils Absolute 05/29/2022 0 06     Sodium 05/29/2022 140     Potassium 05/29/2022 3 5     Chloride 05/29/2022 105     CO2 05/29/2022 23     ANION GAP 05/29/2022 12     BUN 05/29/2022 9     Creatinine 05/29/2022 0 79     Glucose 05/29/2022 93     Calcium 05/29/2022 8 9     eGFR 05/29/2022 88     Total Bilirubin 05/29/2022 0 21     Bilirubin, Direct 05/29/2022 0 04     Alkaline Phosphatase 05/29/2022 88     AST 05/29/2022 <5 (A)    ALT 05/29/2022 12     Total Protein 05/29/2022 7 1     Albumin 05/29/2022 3 6     hs TnI 0hr 05/29/2022 <2     Magnesium 05/29/2022 2 5     Lipase 05/29/2022 653 (A)    hs TnI 2hr 05/29/2022 <2     Delta 2hr hsTnI 05/29/2022      Ventricular Rate 05/29/2022 73     Atrial Rate 05/29/2022 73     NV Interval 05/29/2022 164     QRSD Interval 05/29/2022 70     QT Interval 05/29/2022 372     QTC Interval 05/29/2022 409     P Axis 05/29/2022 63     QRS Axis 05/29/2022 65     T Wave Axis 05/29/2022 11          Radiology Results:   CT abdomen pelvis wo contrast    Result Date: 5/29/2022  Narrative: CT ABDOMEN AND PELVIS WITHOUT IV CONTRAST INDICATION:   Abdominal pain, acute, nonlocalized Abdominal pain, elevated lipase  COMPARISON:  CT abdomen/pelvis dated May 9, 2022  TECHNIQUE:  CT examination of the abdomen and pelvis was performed without intravenous contrast  This examination was performed without intravenous contrast in the context of the critical nationwide Omnipaque shortage  Axial, sagittal, and coronal 2D reformatted images were created from the source data and submitted for interpretation  Radiation dose length product (DLP) for this visit:  610 mGy-cm   This examination, like all CT scans performed in the [facility], was performed utilizing techniques to minimize radiation dose exposure, including the use of iterative reconstruction and automated exposure control  Enteric contrast was not administered  FINDINGS: ABDOMEN LOWER CHEST:  No clinically significant abnormality identified in the visualized lower chest  LIVER/BILIARY TREE:  Unremarkable  GALLBLADDER:  No calcified gallstones  No pericholecystic inflammatory change  SPLEEN:  Unremarkable  PANCREAS:  Unremarkable  ADRENAL GLANDS:  Unremarkable  KIDNEYS/URETERS:  Unremarkable  No hydronephrosis  STOMACH AND BOWEL:  Postoperative changes of prior small bowel surgery are present  There is a small amount of liquid stool noted throughout colon    No evidence of large or small bowel obstruction  APPENDIX:  A normal appendix was visualized  ABDOMINOPELVIC CAVITY:  No ascites  No pneumoperitoneum  No lymphadenopathy  VESSELS:  Unremarkable for patient's age  PELVIS REPRODUCTIVE ORGANS:  Unremarkable for patient's age  URINARY BLADDER:  Unremarkable  ABDOMINAL WALL/INGUINAL REGIONS:  Unremarkable  OSSEOUS STRUCTURES:  No acute fracture or destructive osseous lesion  Impression: No acute intra-abdominal abnormality  No free air or free fluid  Small amount of liquid stool noted throughout the colon which may be related to a gastroenteritis/diarrhea  No evidence of large or small bowel obstruction  Although the patient has abnormally elevated serum lipase levels, there is no peripancreatic inflammatory stranding  Workstation performed: [id number]     XR chest 2 views    Result Date: 5/30/2022  Narrative: CHEST INDICATION:   chest pain  COMPARISON:  March 28, 2022  EXAM PERFORMED/VIEWS:  XR CHEST PA & LATERAL  The frontal view was performed utilizing dual energy radiographic technique  FINDINGS: Cardiomediastinal silhouette appears unremarkable  The lungs are clear  No pneumothorax or pleural effusion  Osseous structures appear within normal limits for patient age  Impression: No acute cardiopulmonary disease  Workstation performed: [id number]     CT soft tissue neck wo contrast    Result Date: 5/29/2022  Narrative: CT SOFT TISSUE NECK WITHOUT CONTRAST INDICATION:   Laryngeal stenosis Difficulty swallowing and sensation of throat closing  COMPARISON:  None  TECHNIQUE:  Axial, sagittal, and coronal 2D reformatted images were created from the axial source data and submitted for interpretation  This examination was performed without intravenous contrast in the context of the critical nationwide Omnipaque shortage  Radiation dose length product (DLP) for this visit:  416 mGy-cm     This examination, like all CT scans performed in the [facility], was performed utilizing techniques to minimize radiation dose exposure, including the use of iterative reconstruction and automated exposure control  IMAGE QUALITY:  Diagnostic  FINDINGS: VISUALIZED BRAIN PARENCHYMA:  Normal visualized brain parenchyma  VISUALIZED ORBITS AND PARANASAL SINUSES:  Normal  NASAL CAVITY AND NASOPHARYNX:  Normal  SUPRAHYOID NECK:  Normal oropharynx and oral cavity  Normal parapharyngeal and retropharyngeal spaces  Normal tonsillar tissue and epiglottis  Normal infratemporal space  INFRAHYOID NECK:  Aryepiglottic folds and piriform sinuses are normal  Normal larynx and subglottic airway  THYROID GLAND:  Unremarkable  PAROTID AND SUBMANDIBULAR GLANDS: Normal  LYMPH NODES:  No pathologic or enlarged adenopathy  VASCULAR STRUCTURES:  Limited without contrast  THORACIC INLET:  Lung apices and upper mediastinum are unremarkable  BONY STRUCTURES:  Normal      Impression: No CT findings to account for the patient's symptoms  No evidence of cervical mass, collection, or pathologic lymphadenopathy   Workstation performed: [id number]

## 2022-06-20 ENCOUNTER — OFFICE VISIT (OUTPATIENT)
Dept: INTERNAL MEDICINE CLINIC | Facility: CLINIC | Age: 47
End: 2022-06-20
Payer: COMMERCIAL

## 2022-06-20 VITALS
OXYGEN SATURATION: 99 % | HEART RATE: 91 BPM | RESPIRATION RATE: 18 BRPM | BODY MASS INDEX: 33.14 KG/M2 | TEMPERATURE: 96.4 F | WEIGHT: 175.4 LBS | SYSTOLIC BLOOD PRESSURE: 118 MMHG | DIASTOLIC BLOOD PRESSURE: 78 MMHG

## 2022-06-20 DIAGNOSIS — Z12.31 ENCOUNTER FOR SCREENING MAMMOGRAM FOR BREAST CANCER: ICD-10-CM

## 2022-06-20 DIAGNOSIS — Z11.59 NEED FOR HEPATITIS C SCREENING TEST: ICD-10-CM

## 2022-06-20 DIAGNOSIS — Z13.9 SCREENING DUE: ICD-10-CM

## 2022-06-20 DIAGNOSIS — Z13.220 LIPID SCREENING: ICD-10-CM

## 2022-06-20 DIAGNOSIS — L60.0 INGROWN NAIL OF GREAT TOE OF LEFT FOOT: Primary | ICD-10-CM

## 2022-06-20 PROCEDURE — 99213 OFFICE O/P EST LOW 20 MIN: CPT | Performed by: FAMILY MEDICINE

## 2022-06-20 RX ORDER — QUETIAPINE FUMARATE 100 MG/1
TABLET, FILM COATED ORAL
COMMUNITY
Start: 2022-06-16

## 2022-06-20 NOTE — PROGRESS NOTES
FOLLOW-UP OFFICE VISIT  [facility]    NAME: Zoila Guerrero  AGE: 48 y o  SEX: female  : 1975     DATE: 2022     Assessment and Plan:     Problem List Items Addressed This Visit    None     Visit Diagnoses     Ingrown nail of great toe of left foot    -  Primary    Relevant Orders    Ambulatory Referral to Podiatry    Need for hepatitis C screening test        Encounter for screening mammogram for breast cancer        Relevant Orders    Mammo screening bilateral w 3d & cad    Lipid screening        Relevant Orders    Lipid Panel with Direct LDL reflex    Screening due        Relevant Orders    Comprehensive metabolic panel        Ingrown toe nail without evidence of cellulitis/infection noted  Referral for podiatry placed  Mammo reordered  Pt encouraged to rescued OBGYN appt that was canceled in may  Number for [facility] provided  Screening labs ordered for upcoming annual exam          Return in about 8 weeks (around 8/15/2022) for Annual physical      Chief Complaint:     Chief Complaint   Patient presents with    Physical Exam     Pt states that she has a painful ingrown toe nail on her left foot and needs a referral for the foot doctor and also a referral to a gyn doctor         History of Present Illness:     Ingrown toe nail of the left foot  Great toe  Onset years  Worsened over the past few weeks  Red and tender  No swelling  Able to walk comfortable  Had same issue of the right foot  Needed podiatry to remove nail completely to resolve issue  Would like togo back to podiatry but need referral       Review of Systems:     Review of Systems   Constitutional: Negative for fever  Musculoskeletal: Positive for myalgias (left great toe )  Negative for gait problem          Problem List:     Patient Active Problem List   Diagnosis    Incisional hernia, without obstruction or gangrene    Fibromyalgia    Numbness    MDD (major depressive disorder), recurrent, severe, with psychosis ([facility] [address] )    Somatoform disorder, unspecified    Disc degeneration, lumbar    Constipation    Arthritis    Generalized anxiety disorder    Vitamin D deficiency    Obesity (BMI 30-39  9)    Cigarette nicotine dependence without complication    Ventral hernia    Hypotension    Chronic pain syndrome    Cough    Chronic bilateral thoracic back pain    Abdominal pain        Objective:     /78 (BP Location: Left arm, Patient Position: Sitting, Cuff Size: Standard)   Pulse 91   Temp (!) 96 4 °F (35 8 °C) (Temporal)   Resp 18   Wt 79 6 kg (175 lb 6 4 oz)   SpO2 99%   BMI 33 14 kg/m²     Physical Exam  HENT:      Head: Normocephalic  Eyes:      Conjunctiva/sclera: Conjunctivae normal    Cardiovascular:      Rate and Rhythm: Normal rate  Pulmonary:      Effort: Pulmonary effort is normal    Feet:      Left foot:      Toenail Condition: Left toenails are ingrown  Comments: Mild erythema along the lateral surface of the hallux  No involvement of the distal or proximal interphalangeal joint  No  swelling  Good ROM of joint  Neurological:      Mental Status: She is alert               Pertinent Laboratory/Diagnostic Studies:    Laboratory Results: I have personally reviewed the pertinent laboratory results/reports     Chemistry Profile:   Results from Last 12 Months   Lab Units 05/29/22  2140   POTASSIUM mmol/L 3 5   CHLORIDE mmol/L 105   CO2 mmol/L 23   BUN mg/dL 9   CREATININE mg/dL 0 79   GLUCOSE RANDOM mg/dL 93   CALCIUM mg/dL 8 9   MAGNESIUM mg/dL 2 5   AST U/L <5*   ALT U/L 12   ALK PHOS U/L 88   EGFR ml/min/1 [address]sq [facility] [facility]  6/20/2022 1:34 PM

## 2022-06-20 NOTE — PATIENT INSTRUCTIONS
Please contact the office to reschedule your OB/GYN VISIT  at [phone], option 1  SCHEDULE a visit with podiatry  Get your blood work completed before your annual visit  Make sure to reschedule your mammogram as well

## 2022-06-24 ENCOUNTER — HOSPITAL ENCOUNTER (OUTPATIENT)
Dept: MAMMOGRAPHY | Facility: CLINIC | Age: 47
Discharge: HOME | End: 2022-06-24
Payer: COMMERCIAL

## 2022-06-24 VITALS — BODY MASS INDEX: 33.04 KG/M2 | WEIGHT: 175 LBS | HEIGHT: 61 IN

## 2022-06-24 DIAGNOSIS — Z12.31 ENCOUNTER FOR SCREENING MAMMOGRAM FOR BREAST CANCER: ICD-10-CM

## 2022-06-24 PROCEDURE — 77067 SCR MAMMO BI INCL CAD: CPT

## 2022-06-24 PROCEDURE — 77063 BREAST TOMOSYNTHESIS BI: CPT

## 2022-06-29 ENCOUNTER — ANESTHESIA EVENT (OUTPATIENT)
Dept: GASTROENTEROLOGY | Facility: HOSPITAL | Age: 47
End: 2022-06-29

## 2022-06-29 ENCOUNTER — HOSPITAL ENCOUNTER (OUTPATIENT)
Dept: GASTROENTEROLOGY | Facility: HOSPITAL | Age: 47
Setting detail: OUTPATIENT SURGERY
Discharge: HOME | End: 2022-06-29
Admitting: INTERNAL MEDICINE
Payer: COMMERCIAL

## 2022-06-29 ENCOUNTER — ANESTHESIA (OUTPATIENT)
Dept: GASTROENTEROLOGY | Facility: HOSPITAL | Age: 47
End: 2022-06-29

## 2022-06-29 VITALS
DIASTOLIC BLOOD PRESSURE: 60 MMHG | HEIGHT: 61 IN | HEART RATE: 78 BPM | OXYGEN SATURATION: 96 % | TEMPERATURE: 97.3 F | BODY MASS INDEX: 33.59 KG/M2 | SYSTOLIC BLOOD PRESSURE: 111 MMHG | RESPIRATION RATE: 18 BRPM | WEIGHT: 177.91 LBS

## 2022-06-29 DIAGNOSIS — R13.10 ODYNOPHAGIA: ICD-10-CM

## 2022-06-29 PROCEDURE — 88305 TISSUE EXAM BY PATHOLOGIST: CPT | Performed by: PATHOLOGY

## 2022-06-29 PROCEDURE — 43248 EGD GUIDE WIRE INSERTION: CPT | Performed by: INTERNAL MEDICINE

## 2022-06-29 PROCEDURE — 43239 EGD BIOPSY SINGLE/MULTIPLE: CPT | Performed by: INTERNAL MEDICINE

## 2022-06-29 RX ORDER — SODIUM CHLORIDE, SODIUM LACTATE, POTASSIUM CHLORIDE, CALCIUM CHLORIDE 600; 310; 30; 20 MG/100ML; MG/100ML; MG/100ML; MG/100ML
125 INJECTION, SOLUTION INTRAVENOUS CONTINUOUS
Status: CANCELLED | OUTPATIENT
Start: 2022-06-29

## 2022-06-29 RX ORDER — PROPOFOL 10 MG/ML
INJECTION, EMULSION INTRAVENOUS AS NEEDED
Status: DISCONTINUED | OUTPATIENT
Start: 2022-06-29 | End: 2022-06-29

## 2022-06-29 RX ORDER — LIDOCAINE HYDROCHLORIDE 20 MG/ML
INJECTION, SOLUTION EPIDURAL; INFILTRATION; INTRACAUDAL; PERINEURAL AS NEEDED
Status: DISCONTINUED | OUTPATIENT
Start: 2022-06-29 | End: 2022-06-29

## 2022-06-29 RX ORDER — SODIUM CHLORIDE, SODIUM LACTATE, POTASSIUM CHLORIDE, CALCIUM CHLORIDE 600; 310; 30; 20 MG/100ML; MG/100ML; MG/100ML; MG/100ML
125 INJECTION, SOLUTION INTRAVENOUS CONTINUOUS
Status: DISCONTINUED | OUTPATIENT
Start: 2022-06-29 | End: 2022-07-03 | Stop reason: HOSPADM

## 2022-06-29 RX ADMIN — PROPOFOL 50 MG: 10 INJECTION, EMULSION INTRAVENOUS at 12:34

## 2022-06-29 RX ADMIN — PROPOFOL 50 MG: 10 INJECTION, EMULSION INTRAVENOUS at 12:39

## 2022-06-29 RX ADMIN — LIDOCAINE HYDROCHLORIDE 100 MG: 20 INJECTION, SOLUTION EPIDURAL; INFILTRATION; INTRACAUDAL; PERINEURAL at 12:32

## 2022-06-29 RX ADMIN — SODIUM CHLORIDE, SODIUM LACTATE, POTASSIUM CHLORIDE, AND CALCIUM CHLORIDE 125 ML/HR: .6; .31; .03; .02 INJECTION, SOLUTION INTRAVENOUS at 12:02

## 2022-06-29 RX ADMIN — PROPOFOL 150 MG: 10 INJECTION, EMULSION INTRAVENOUS at 12:32

## 2022-06-29 RX ADMIN — PROPOFOL 50 MG: 10 INJECTION, EMULSION INTRAVENOUS at 12:37

## 2022-06-29 NOTE — ANESTHESIA PREPROCEDURE EVALUATION
Procedure:  EGD    ASSESSMENT AND PLAN:       1  Irritable bowel syndrome with diarrhea  She has always tended towards constipation but recently notes bouts of severe diarrhea  Start fiber and probiotic  Trial of Xifaxan   She is under a great deal of stress and struggling with anxiety and insomnia  Many of her psychiatric meds are being changed which may be exacerbating these symptoms     2  Fungal esophagitis  3  Odynophagia  She was diagnosed with thrush during a recent ER visit and is on magic mouthwash  This has helped her oral pain but she still notes throat and chest pain with swallowing  Will plan EGD  Will give a 7 day fluconazole course     ______________________________________________________________________     SUBJECTIVE:  57-year-old female presents for evaluation of altering diarrhea and constipation as well as odynophagia  She notes that over the past few weeks she has been fluctuating between severe constipation and bouts of severe diarrhea  Although she has suffered with constipation past it is more severe than it ever has been  She has never really had issues with diarrhea  She has been in the emergency room the symptoms with normal testing including a CT scan  She admits that over the past few weeks she has been under a great deal of stress  Many of her psych meds are being changed by her psychiatrist   She is struggling with insomnia  She also notes issues with dysphagia and odynophagia  Her most recent emergency room visit was for this problem  She was diagnosed with thrush and given magic mouthwash  Although this helped any oral pain that she was having a did not help her swallowing  She is taking pantoprazole 40 mg once daily  She does not remember when her last upper endoscopy was  She had a colonoscopy last year with sessile serrated and hyperplastic polyp removed      Incisional hernia, without obstruction or gangrene   Fibromyalgia   Numbness   MDD (major depressive disorder), recurrent, severe, with psychosis ([facility] [address] )   Somatoform disorder, unspecified   Disc degeneration, lumbar   Constipation   Arthritis   Generalized anxiety disorder   Vitamin D deficiency   Obesity (BMI 30-39  9)   Cigarette nicotine dependence without complication   Ventral hernia   Hypotension   Chronic pain syndrome   Cough   Chronic bilateral thoracic back pain   Abdominal pain       Relevant Problems   CARDIO   (+) Chronic bilateral thoracic back pain      MUSCULOSKELETAL   (+) Arthritis   (+) Chronic bilateral thoracic back pain   (+) Disc degeneration, lumbar   (+) Fibromyalgia      NEURO/PSYCH   (+) Chronic bilateral thoracic back pain   (+) Chronic pain syndrome   (+) Fibromyalgia   (+) Generalized anxiety disorder   (+) MDD (major depressive disorder), recurrent, severe, with psychosis ([facility] [address] )        Physical Exam    Airway      TM Distance: >3 FB  Neck ROM: full     Dental       Cardiovascular  Cardiovascular exam normal    Pulmonary  Pulmonary exam normal     Other Findings        Anesthesia Plan  ASA Score- 3     Anesthesia Type- IV sedation with anesthesia with ASA Monitors  Additional Monitors:   Airway Plan:           Plan Factors-    Chart reviewed  EKG reviewed  Imaging results reviewed  Existing labs reviewed  Patient summary reviewed  Patient is a current smoker  Induction- intravenous  Postoperative Plan-     Informed Consent- Anesthetic plan and risks discussed with patient  I personally reviewed this patient with the CRNA  Discussed and agreed on the Anesthesia Plan with the CRNA  Sharon HessDaisy

## 2022-06-29 NOTE — H&P
History and Physical - [facility]  Jitendra He 48 y o  female MRN: [id number]      HPI: Jitendra He is a 48y o  year old female who presents for evaluation of dysphagia and odynophagia      REVIEW OF SYSTEMS: Per the HPI, and otherwise unremarkable  Historical Information   Past Medical History:   Diagnosis Date    Bladder perforation, intraoperative     Depression     Intestine, perforation (HCC)     intra op    Sepsis ([address] )      Past Surgical History:   Procedure Laterality Date    ABDOMINAL SURGERY      BLADDER SURGERY       SECTION      HYSTERECTOMY      INCISIONAL HERNIA REPAIR N/A 2021    Procedure: OPEN REPAIR VENTRAL/INCISIONAL HERNIA W/MESH;  Surgeon: Edward Machado MD;  Location: [facility];  Service: General    OOPHORECTOMY Bilateral      Social History   Social History     Substance and Sexual Activity   Alcohol Use Not Currently     Social History     Substance and Sexual Activity   Drug Use Never     Social History     Tobacco Use   Smoking Status Current Every Day Smoker    Packs/day: 0 50   Smokeless Tobacco Never Used     Family History   Problem Relation Age of Onset    No Known Problems Mother     Pancreatic cancer Father     No Known Problems Sister     No Known Problems Sister     No Known Problems Sister     No Known Problems Daughter     Cervical cancer Maternal Grandmother     No Known Problems Paternal Grandmother     No Known Problems Maternal Aunt     No Known Problems Maternal Aunt     No Known Problems Maternal Aunt     No Known Problems Maternal Aunt     No Known Problems Paternal Aunt     Pancreatic cancer Paternal Aunt     Breast cancer Neg Hx     Endometrial cancer Neg Hx     Ovarian cancer Neg Hx        Meds/Allergies     (Not in a hospital admission)      No Known Allergies    Objective     Blood pressure 111/67, pulse 86, temperature 98 °F (36 7 °C), temperature source Temporal, resp   rate 18, height 5' 1" (1 549 m), weight 80 7 kg (177 lb 14 6 oz), SpO2 97 %, not currently breastfeeding  PHYSICAL EXAM    Gen: NAD  CV: RRR  CHEST: Clear  ABD: soft, NT/ND  EXT: no edema      ASSESSMENT/PLAN:  This is a 48y o  year old female here for EGD with possible biopsy and dilation, and she is stable and optimized for her procedure

## 2022-06-29 NOTE — ANESTHESIA POSTPROCEDURE EVALUATION
Post-Op Assessment Note    CV Status:  Stable  Pain Score: 0    Pain management: adequate     Mental Status:  Sleepy   Hydration Status:  Euvolemic   PONV Controlled:  Controlled   Airway Patency:  Patent      Post Op Vitals Reviewed: Yes      Staff: CRNA         No complications documented      /70 (06/29/22 1243)    Temp (!) 97 3 °F (36 3 °C) (06/29/22 1243)    Pulse 79 (06/29/22 1243)   Resp 16 (06/29/22 1243)    SpO2 94 % (06/29/22 1243)

## 2022-07-20 ENCOUNTER — TELEPHONE (OUTPATIENT)
Dept: GASTROENTEROLOGY | Facility: CLINIC | Age: 47
End: 2022-07-20

## 2022-07-20 NOTE — TELEPHONE ENCOUNTER
----- Message from Morris Rabago PA-C sent at 7/20/2022 12:51 PM EDT -----  Please inform patient that all the biopsies from her esophagus were completely benign    Thank you

## 2022-07-24 DIAGNOSIS — R10.9 ABDOMINAL PAIN: ICD-10-CM

## 2022-07-24 RX ORDER — PANTOPRAZOLE SODIUM 40 MG/1
TABLET, DELAYED RELEASE ORAL
Qty: 30 TABLET | Refills: 2 | Status: SHIPPED | OUTPATIENT
Start: 2022-07-24 | End: 2022-07-25 | Stop reason: SDUPTHER

## 2022-07-25 ENCOUNTER — OFFICE VISIT (OUTPATIENT)
Dept: GASTROENTEROLOGY | Facility: CLINIC | Age: 47
End: 2022-07-25
Payer: COMMERCIAL

## 2022-07-25 VITALS
BODY MASS INDEX: 33.42 KG/M2 | DIASTOLIC BLOOD PRESSURE: 80 MMHG | WEIGHT: 177 LBS | OXYGEN SATURATION: 98 % | HEIGHT: 61 IN | HEART RATE: 93 BPM | SYSTOLIC BLOOD PRESSURE: 122 MMHG

## 2022-07-25 DIAGNOSIS — R10.13 EPIGASTRIC PAIN: ICD-10-CM

## 2022-07-25 DIAGNOSIS — R10.9 ABDOMINAL PAIN: ICD-10-CM

## 2022-07-25 DIAGNOSIS — K58.2 IRRITABLE BOWEL SYNDROME WITH BOTH CONSTIPATION AND DIARRHEA: ICD-10-CM

## 2022-07-25 DIAGNOSIS — K21.9 GASTROESOPHAGEAL REFLUX DISEASE WITHOUT ESOPHAGITIS: Primary | ICD-10-CM

## 2022-07-25 PROCEDURE — 99213 OFFICE O/P EST LOW 20 MIN: CPT | Performed by: PHYSICIAN ASSISTANT

## 2022-07-25 RX ORDER — PANTOPRAZOLE SODIUM 40 MG/1
40 TABLET, DELAYED RELEASE ORAL 2 TIMES DAILY
Qty: 60 TABLET | Refills: 2 | Status: SHIPPED | OUTPATIENT
Start: 2022-07-25 | End: 2022-10-24

## 2022-07-25 RX ORDER — DICYCLOMINE HCL 20 MG
20 TABLET ORAL 2 TIMES DAILY
Qty: 40 TABLET | Refills: 1 | Status: SHIPPED | OUTPATIENT
Start: 2022-07-25 | End: 2022-09-14 | Stop reason: SDUPTHER

## 2022-07-25 NOTE — LETTER
July 25, 2022       No Recipients    Patient: Enma Murphy   YOB: 1975   Date of Visit: 7/25/2022       Dear Dr Duarte Bachelor Recipients: Thank you for referring Enma Murphy to me for evaluation  Below are my notes for this consultation  If you have questions, please do not hesitate to call me  I look forward to following your patient along with you           Sincerely,        Louann Martines PA-C        CC:   No Recipients

## 2022-07-25 NOTE — PROGRESS NOTES
[facility] - Outpatient Follow-up Note  Ethyl Search 48 y o  female MRN: [id number]  Encounter: [id number]          ASSESSMENT AND PLAN:      1  Gastroesophageal reflux disease without esophagitis  Increase Pantoprazole to BID  EGD with esophageal biopsies was negative  Although better she still notes intermittent dysphagia    2  Irritable bowel syndrome with both constipation and diarrhea  Failed Xifaxan course  She is on fiber and probiotic  She is using dicyclomine PRN  Reviewed low FODMAP diet to trial    Overall her anxiety has improved  She is on new medications which seem to be helping    Will check labs    ______________________________________________________________________    SUBJECTIVE:  42-year-old female with GERD irritable bowel syndrome presents for routine follow-up  Her upper endoscopy was normal   Biopsies were taken of the esophagus and were benign  She had no evidence of a fungal infection in the esophagus  Overall her symptoms have improved however she still reports intermittent dysphagia  She is taking pantoprazole 40 mg once daily  She continues to fluctuate between diarrhea and constipation  Unfortunately, this has not improved significantly  She is status post a 14 day Xifaxan course and is taking a fiber supplement and a probiotic  She reports that her anxiety symptoms have improved significantly since her last appointment  She is on new medications which seem to be helping  REVIEW OF SYSTEMS IS OTHERWISE NEGATIVE        Historical Information   Past Medical History:   Diagnosis Date    Bladder perforation, intraoperative     Depression     Intestine, perforation ([facility] [address] )     intra op    Sepsis ([facility] [address] )      Past Surgical History:   Procedure Laterality Date    ABDOMINAL SURGERY      BLADDER SURGERY       SECTION      HYSTERECTOMY  2015    INCISIONAL HERNIA REPAIR N/A 2021    Procedure: OPEN REPAIR VENTRAL/INCISIONAL HERNIA W/MESH;  Surgeon: Tanya Rodriguez MD;  Location: [facility];  Service: General    OOPHORECTOMY Bilateral 2015     Social History   Social History     Substance and Sexual Activity   Alcohol Use Not Currently     Social History     Substance and Sexual Activity   Drug Use Never     Social History     Tobacco Use   Smoking Status Current Every Day Smoker    Packs/day: 0 50   Smokeless Tobacco Never Used     Family History   Problem Relation Age of Onset    No Known Problems Mother     Pancreatic cancer Father     No Known Problems Sister     No Known Problems Sister     No Known Problems Sister     No Known Problems Daughter     Cervical cancer Maternal Grandmother     No Known Problems Paternal Grandmother     No Known Problems Maternal Aunt     No Known Problems Maternal Aunt     No Known Problems Maternal Aunt     No Known Problems Maternal Aunt     No Known Problems Paternal Aunt     Pancreatic cancer Paternal Aunt     Breast cancer Neg Hx     Endometrial cancer Neg Hx     Ovarian cancer Neg Hx        Meds/Allergies       Current Outpatient Medications:     albuterol (2 5 mg/3 mL) 0 083 % nebulizer solution    dicyclomine (BENTYL) 20 mg tablet    hydrOXYzine pamoate (VISTARIL) 25 mg capsule    LORazepam (ATIVAN) 1 mg tablet    nystatin (MYCOSTATIN) 500,000 units/5 mL suspension    pantoprazole (PROTONIX) 40 mg tablet    QUEtiapine (SEROquel) 100 mg tablet    venlafaxine (EFFEXOR-XR) 150 mg 24 hr capsule    venlafaxine (EFFEXOR-XR) 75 mg 24 hr capsule    zolpidem (AMBIEN) 10 mg tablet    No Known Allergies        Objective     Blood pressure 122/80, pulse 93, height 5' 1" (1 549 m), weight 80 3 kg (177 lb), SpO2 98 %, not currently breastfeeding  Body mass index is 33 44 kg/m²        PHYSICAL EXAM:      General Appearance:   Alert, cooperative, no distress   HEENT:   Normocephalic, atraumatic, anicteric      Neck:  Supple, symmetrical, trachea midline   Lungs:   Clear to auscultation bilaterally; no rales, rhonchi or wheezing; respirations unlabored    Heart[de-identified]   Regular rate and rhythm; no murmur, rub, or gallop  Abdomen:   Soft, non-tender, non-distended; normal bowel sounds; no masses, no organomegaly    Genitalia:   Deferred    Rectal:   Deferred    Extremities:  No cyanosis, clubbing or edema    Pulses:  2+ and symmetric    Skin:  No jaundice, rashes, or lesions    Lymph nodes:  No palpable cervical lymphadenopathy        Lab Results:   No visits with results within 1 Day(s) from this visit  Latest known visit with results is:   Hospital Outpatient Visit on 06/29/2022   Component Date Value    Case Report 06/29/2022                      Value:Surgical Pathology Report                         Case: [id number]                                   Authorizing Provider:  Norma Odell DO          Collected:           06/29/2022 1241              Ordering Location:      [facility]       Received:            06/29/2022 [address] [place] [address] Endoscopy                                                             Pathologist:           Enrique Farooq MD                                                    Specimens:   A) - Esophagus, distal                                                                              B) - Esophagus, proximal                                                                   Final Diagnosis 06/29/2022                      Value: This result contains rich text formatting which cannot be displayed here   Additional Information 06/29/2022                      Value: This result contains rich text formatting which cannot be displayed here  J Carlos Bee Gross Description 06/29/2022                      Value: This result contains rich text formatting which cannot be displayed here      Clinical Information 06/29/2022                      Value:Cold biopsies r/o eosinophilic esophagitis         Radiology Results:   EGD    Result Date: 6/29/2022  Narrative:  [facility] [address] [place] [address] [phone] DATE OF SERVICE: 6/29/22 PHYSICIAN(S): Attending: Ale Chaudhry DO Fellow: No Staff Documented INDICATION: Odynophagia POST-OP DIAGNOSIS: See the impression below  PREPROCEDURE: Informed consent was obtained for the procedure, including sedation  Risks of perforation, hemorrhage, adverse drug reaction and aspiration were discussed  The patient was placed in the left lateral decubitus position  Patient was explained about the risks and benefits of the procedure  Risks including but not limited to bleeding, infection, and perforation were explained in detail  Also explained about less than 100% sensitivity with the exam and other alternatives  DETAILS OF PROCEDURE: Patient was taken to the procedure room where a time out was performed to confirm correct patient and correct procedure  The patient underwent monitored anesthesia care, which was administered by an anesthesia professional  The patient's blood pressure, heart rate, level of consciousness, respirations and oxygen were monitored throughout the procedure  The scope was advanced to the second part of the duodenum  Retroflexion was performed in the cardia and fundus  Prior to the procedure, the patient's H  Pylori status was unknown  The patient's estimated blood loss was minimal (<5 mL)  The procedure was not difficult  The patient tolerated the procedure well  There were no apparent complications   ANESTHESIA INFORMATION: ASA: III Anesthesia Type: IV Sedation with Anesthesia MEDICATIONS: lactated ringers infusion 200 mL*  *From user-documented volume (Totals for administrations occurring from 1231 to 1243 on 06/29/22) FINDINGS: The cricopharynx, upper third of the esophagus, middle third of the esophagus, lower third of the esophagus, GE junction and Z-line appeared normal  Performed 4 forceps biopsies to rule out eosinophilic esophagitis in the lower third of the esophagus Performed 4 forceps biopsies to rule out eosinophilic esophagitis in the upper third of the esophagus The cardia, fundus of the stomach, body of the stomach, greater curve of the stomach, lesser curve of the stomach, incisura, antrum, prepyloric region and pylorus appeared normal  The duodenal bulb and 2nd part of the duodenum appeared normal  Dilated with Savary-Mary dilator using guidewire to 47 Fr ending size SPECIMENS: ID Type Source Tests Collected by Time Destination 1 : distal Tissue Esophagus TISSUE EXAM Pau Montoya DO 6/29/2022 12:41 PM  2 : proximal Tissue Esophagus TISSUE EXAM Pau Montoya DO 6/29/2022 12:42 PM      Impression: 1  Normal esophagogastroduodenoscopy 2  Dysphagia/odynophagia, status post biopsies esophagus to rule out eosinophilic esophagitis followed by an empiric dilation with a 54 [place] Savary dilator RECOMMENDATION: 1   Will contact the patient within 1-2 weeks regarding the biopsy results   Pau Montoya DO [place], [place]

## 2022-09-14 DIAGNOSIS — R10.9 ABDOMINAL PAIN: ICD-10-CM

## 2022-09-14 RX ORDER — DICYCLOMINE HCL 20 MG
20 TABLET ORAL 2 TIMES DAILY
Qty: 40 TABLET | Refills: 1 | Status: SHIPPED | OUTPATIENT
Start: 2022-09-14 | End: 2022-10-23

## 2022-10-23 DIAGNOSIS — R10.9 ABDOMINAL PAIN: ICD-10-CM

## 2022-10-23 RX ORDER — DICYCLOMINE HCL 20 MG
TABLET ORAL
Qty: 40 TABLET | Refills: 1 | Status: SHIPPED | OUTPATIENT
Start: 2022-10-23

## 2022-10-24 DIAGNOSIS — R10.13 EPIGASTRIC PAIN: ICD-10-CM

## 2022-10-24 RX ORDER — PANTOPRAZOLE SODIUM 40 MG/1
TABLET, DELAYED RELEASE ORAL
Qty: 60 TABLET | Refills: 2 | Status: SHIPPED | OUTPATIENT
Start: 2022-10-24

## 2022-12-21 DIAGNOSIS — R10.9 ABDOMINAL PAIN: ICD-10-CM

## 2022-12-21 RX ORDER — DICYCLOMINE HCL 20 MG
20 TABLET ORAL 2 TIMES DAILY
Qty: 40 TABLET | Refills: 1 | Status: SHIPPED | OUTPATIENT
Start: 2022-12-21

## 2023-01-17 ENCOUNTER — HOSPITAL ENCOUNTER (EMERGENCY)
Facility: HOSPITAL | Age: 48
Discharge: HOME | End: 2023-01-17
Attending: EMERGENCY MEDICINE

## 2023-01-17 VITALS
DIASTOLIC BLOOD PRESSURE: 71 MMHG | HEART RATE: 101 BPM | SYSTOLIC BLOOD PRESSURE: 130 MMHG | OXYGEN SATURATION: 96 % | TEMPERATURE: 98.3 F | RESPIRATION RATE: 20 BRPM

## 2023-01-17 DIAGNOSIS — B34.9 VIRAL SYNDROME: ICD-10-CM

## 2023-01-17 DIAGNOSIS — R11.2 NAUSEA & VOMITING: Primary | ICD-10-CM

## 2023-01-17 RX ORDER — ONDANSETRON 4 MG/1
4 TABLET, ORALLY DISINTEGRATING ORAL ONCE
Status: COMPLETED | OUTPATIENT
Start: 2023-01-17 | End: 2023-01-17

## 2023-01-17 RX ORDER — ONDANSETRON 4 MG/1
4 TABLET, ORALLY DISINTEGRATING ORAL EVERY 8 HOURS PRN
Qty: 20 TABLET | Refills: 0 | Status: SHIPPED | OUTPATIENT
Start: 2023-01-17 | End: 2023-02-06

## 2023-01-17 RX ADMIN — ONDANSETRON 4 MG: 4 TABLET, ORALLY DISINTEGRATING ORAL at 10:56

## 2023-01-17 NOTE — ED PROVIDER NOTES
History  Chief Complaint   Patient presents with   • Vomiting     C/o vomiting "non stop" since       50year-old female has been experiencing nausea and vomiting since  afternoon  She denies any focal abdominal pain  She has some left upper back discomfort from straining and retching which is reproduced with palpation of the musculature between the scapula and spine where there is palpated spasm  No focal abdominal tenderness on physical examination  Subjective fevers and myalgias      Vomiting  Associated symptoms: myalgias    Associated symptoms: no abdominal pain, no cough, no diarrhea, no fever, no headaches and no sore throat        Prior to Admission Medications   Prescriptions Last Dose Informant Patient Reported? Taking?    LORazepam (ATIVAN) 1 mg tablet   Yes No   Sig: Take 1 mg by mouth every 8 (eight) hours as needed for anxiety   QUEtiapine (SEROquel) 100 mg tablet   Yes No   Sig: take 1 AND 1/2 tablets by mouth daily if needed for insomnia   albuterol (2 5 mg/3 mL) 0 083 % nebulizer solution   No No   Sig: Take 6 mL (5 mg total) by nebulization every 6 (six) hours as needed for wheezing   dicyclomine (BENTYL) 20 mg tablet   No No   Sig: Take 1 tablet (20 mg total) by mouth 2 (two) times a day   pantoprazole (PROTONIX) 40 mg tablet   No No   Sig: take 1 tablet by mouth twice a day   venlafaxine (EFFEXOR-XR) 150 mg 24 hr capsule   Yes No   Sig: take 1 capsule by mouth at bedtime AFTER COMPLETION OF 75MG   venlafaxine (EFFEXOR-XR) 75 mg 24 hr capsule   Yes No   Sig: TAKE 1 CAPSULE ORALLY  DAILY WITH FOOD (WITH 150MG)      Facility-Administered Medications: None       Past Medical History:   Diagnosis Date   • Bladder perforation, intraoperative    • Depression    • Intestine, perforation (HCC)     intra op   • Sepsis ([facility] [address] )        Past Surgical History:   Procedure Laterality Date   • ABDOMINAL SURGERY     • BLADDER SURGERY     •  SECTION     • HYSTERECTOMY     • INCISIONAL HERNIA REPAIR N/A 03/22/2021    Procedure: OPEN REPAIR VENTRAL/INCISIONAL HERNIA W/MESH;  Surgeon: Norah Guerin MD;  Location: [facility];  Service: General   • OOPHORECTOMY Bilateral 2015       Family History   Problem Relation Age of Onset   • No Known Problems Mother    • Pancreatic cancer Father    • No Known Problems Sister    • No Known Problems Sister    • No Known Problems Sister    • No Known Problems Daughter    • Cervical cancer Maternal Grandmother    • No Known Problems Paternal Grandmother    • No Known Problems Maternal Aunt    • No Known Problems Maternal Aunt    • No Known Problems Maternal Aunt    • No Known Problems Maternal Aunt    • No Known Problems Paternal Aunt    • Pancreatic cancer Paternal Aunt    • Breast cancer Neg Hx    • Endometrial cancer Neg Hx    • Ovarian cancer Neg Hx      I have reviewed and agree with the history as documented  E-Cigarette/Vaping   • E-Cigarette Use Never User      E-Cigarette/Vaping Substances   • Nicotine No    • THC No    • CBD No    • Flavoring No    • Other No    • Unknown No      Social History     Tobacco Use   • Smoking status: Every Day     Packs/day: 0 50     Types: Cigarettes   • Smokeless tobacco: Never   Vaping Use   • Vaping Use: Never used   Substance Use Topics   • Alcohol use: Not Currently   • Drug use: Never       Review of Systems   Constitutional: Negative for diaphoresis, fatigue and fever  HENT: Negative for congestion, ear pain, nosebleeds and sore throat  Eyes: Negative for photophobia, pain, discharge and visual disturbance  Respiratory: Negative for cough, choking, chest tightness, shortness of breath and wheezing  Cardiovascular: Negative for chest pain and palpitations  Gastrointestinal: Positive for nausea and vomiting  Negative for abdominal distention, abdominal pain and diarrhea  Genitourinary: Negative for dysuria, flank pain and frequency  Musculoskeletal: Positive for myalgias   Negative for back pain, gait problem and joint swelling  Skin: Negative for color change and rash  Neurological: Negative for dizziness, syncope and headaches  Psychiatric/Behavioral: Negative for behavioral problems and confusion  The patient is not nervous/anxious  All other systems reviewed and are negative  Physical Exam  Physical Exam  Vitals and nursing note reviewed  Constitutional:       General: She is not in acute distress  Appearance: She is well-developed  She is not ill-appearing or toxic-appearing  HENT:      Head: Normocephalic and atraumatic  Mouth/Throat:      Dentition: Normal dentition  Eyes:      General:         Right eye: No discharge  Left eye: No discharge  Cardiovascular:      Rate and Rhythm: Normal rate and regular rhythm  Pulmonary:      Effort: Pulmonary effort is normal  No accessory muscle usage or respiratory distress  Abdominal:      General: There is no distension  Tenderness: There is no abdominal tenderness  There is no guarding  Musculoskeletal:         General: Normal range of motion  Cervical back: Normal range of motion and neck supple  Skin:     General: Skin is warm and dry  Neurological:      Mental Status: She is alert and oriented to person, place, and time  Coordination: Coordination normal    Psychiatric:         Behavior: Behavior is cooperative           Vital Signs  ED Triage Vitals   Temperature Pulse Respirations Blood Pressure SpO2   01/17/23 1029 01/17/23 1029 01/17/23 1029 01/17/23 1030 01/17/23 1029   98 3 °F (36 8 °C) 101 20 130/71 96 %      Temp Source Heart Rate Source Patient Position - Orthostatic VS BP Location FiO2 (%)   01/17/23 1029 01/17/23 1029 01/17/23 1029 01/17/23 1029 --   Oral Monitor Sitting Left arm       Pain Score       --                  Vitals:    01/17/23 1029 01/17/23 1030   BP:  130/71   Pulse: 101    Patient Position - Orthostatic VS: Sitting          Visual Acuity      ED Medications  Medications   ondansetron (ZOFRAN-ODT) dispersible tablet 4 mg (4 mg Oral Given 1/17/23 1056)       Diagnostic Studies  Results Reviewed     None                 No orders to display              Procedures  Procedures         ED Course                               SBIRT 22yo+    Flowsheet Row Most Recent Value   SBIRT (23 yo +)    In order to provide better care to our patients, we are screening all of our patients for alcohol and drug use  Would it be okay to ask you these screening questions? Yes Filed at: 01/17/2023 1045   Initial Alcohol Screen: US AUDIT-C     1  How often do you have a drink containing alcohol? 0 Filed at: 01/17/2023 1045   2  How many drinks containing alcohol do you have on a typical day you are drinking? 0 Filed at: 01/17/2023 1045   3a  Male UNDER 65: How often do you have five or more drinks on one occasion? 0 Filed at: 01/17/2023 1045   3b  FEMALE Any Age, or MALE 65+: How often do you have 4 or more drinks on one occassion? 0 Filed at: 01/17/2023 1045   Audit-C Score 0 Filed at: 01/17/2023 1045   HARVINDER: How many times in the past year have you    Used an illegal drug or used a prescription medication for non-medical reasons? Never Filed at: 01/17/2023 1045                    Medical Decision Making  Nausea & vomiting: complicated acute illness or injury  Viral syndrome: complicated acute illness or injury  Risk  OTC drugs  Prescription drug management            Disposition  Final diagnoses:   Nausea & vomiting   Viral syndrome     Time reflects when diagnosis was documented in both MDM as applicable and the Disposition within this note     Time User Action Codes Description Comment    1/17/2023 10:54 AM Velna Hatch Add [R11 2] Nausea & vomiting     1/17/2023 10:54 AM Velna Hatch Add [B34 9] Viral syndrome       ED Disposition     ED Disposition   Discharge    Condition   Stable    Date/Time   Tue Jan 17, 2023 10:54 AM    Comment   Manpreet Otoole discharge to home/self care  Follow-up Information     Follow up With Specialties Details Why Contact Info Additional Information    [facility]  As needed [address] [place] [id number] [facility] Emergency Department, [address] [facility], [place], [place], [address]          Discharge Medication List as of 1/17/2023 10:55 AM      START taking these medications    Details   famotidine-calcium carbonate-magnesium hydroxide (PEPCID COMPLETE) -165 MG CHEW Chew 1 tablet 2 (two) times a day for 25 doses, Starting Tue 1/17/2023, Until Mon 1/30/2023, Normal      ondansetron (ZOFRAN-ODT) 4 mg disintegrating tablet Take 1 tablet (4 mg total) by mouth every 8 (eight) hours as needed for nausea or vomiting for up to 20 days, Starting Tue 1/17/2023, Until Mon 2/6/2023 at 2359, Normal         CONTINUE these medications which have NOT CHANGED    Details   albuterol (2 5 mg/3 mL) 0 083 % nebulizer solution Take 6 mL (5 mg total) by nebulization every 6 (six) hours as needed for wheezing, Starting Mon 3/28/2022, Normal      dicyclomine (BENTYL) 20 mg tablet Take 1 tablet (20 mg total) by mouth 2 (two) times a day, Starting Wed 12/21/2022, Normal      LORazepam (ATIVAN) 1 mg tablet Take 1 mg by mouth every 8 (eight) hours as needed for anxiety, Historical Med      pantoprazole (PROTONIX) 40 mg tablet take 1 tablet by mouth twice a day, Normal      QUEtiapine (SEROquel) 100 mg tablet take 1 AND 1/2 tablets by mouth daily if needed for insomnia, Historical Med      !! venlafaxine (EFFEXOR-XR) 150 mg 24 hr capsule take 1 capsule by mouth at bedtime AFTER COMPLETION OF 75MG, Historical Med      !! venlafaxine (EFFEXOR-XR) 75 mg 24 hr capsule TAKE 1 CAPSULE ORALLY  DAILY WITH FOOD (WITH 150MG), Historical Med       !! - Potential duplicate medications found  Please discuss with provider            No discharge procedures on file      PDMP Review     None          ED Provider  Electronically Signed by           DARY Sandoval  01/17/23 1123

## 2023-01-17 NOTE — Clinical Note
Tracie Pyle was seen and treated in our emergency department on 1/17/2023  Diagnosis:     Jaye Brown  may return to work on return date  She may return on this date: 01/20/2023         If you have any questions or concerns, please don't hesitate to call        DARY Diana    ______________________________           _______________          _______________  Hospital Representative                              Date                                Time

## 2023-01-26 DIAGNOSIS — R10.13 EPIGASTRIC PAIN: ICD-10-CM

## 2023-01-26 RX ORDER — PANTOPRAZOLE SODIUM 40 MG/1
40 TABLET, DELAYED RELEASE ORAL 2 TIMES DAILY
Qty: 180 TABLET | Refills: 3 | Status: SHIPPED | OUTPATIENT
Start: 2023-01-26

## 2023-02-13 ENCOUNTER — TELEPHONE (OUTPATIENT)
Dept: INTERNAL MEDICINE CLINIC | Facility: CLINIC | Age: 48
End: 2023-02-13

## 2023-02-13 NOTE — TELEPHONE ENCOUNTER
Received request from [facility] for medical records    Faxed on 2/14/2023 to [facility] SURGICAL SPECIAL[facility] phone [phone]

## 2023-02-20 ENCOUNTER — TELEPHONE (OUTPATIENT)
Dept: INTERNAL MEDICINE CLINIC | Facility: CLINIC | Age: 48
End: 2023-02-20

## 2023-02-20 NOTE — TELEPHONE ENCOUNTER
Received 3 request for medical records from [facility]   For Ar Jonas Reference # [id number]    Faxed on 2/21/2023 to [facility] phone [phone]

## 2023-02-21 NOTE — TELEPHONE ENCOUNTER
2nd request for medical records from Dr Job Fisher to be sent to [facility]    Faxed on 2/21/2023 to [facility] SURGICAL SPECIAL[facility] \[facility]\"" phone [phone] with patient

## 2023-04-17 ENCOUNTER — TELEPHONE (OUTPATIENT)
Dept: CARDIOLOGY CLINIC | Facility: CLINIC | Age: 48
End: 2023-04-17

## 2023-04-17 NOTE — TELEPHONE ENCOUNTER
Darrin Marrero and Samuel0 Tatum Perales sent request for medical records for patient. Sent to [facility] and scanned into chart.

## 2023-06-06 ENCOUNTER — TELEPHONE (OUTPATIENT)
Age: 48
End: 2023-06-06

## 2023-06-14 ENCOUNTER — APPOINTMENT (EMERGENCY)
Dept: CT IMAGING | Facility: HOSPITAL | Age: 48
End: 2023-06-14
Payer: COMMERCIAL

## 2023-06-14 ENCOUNTER — HOSPITAL ENCOUNTER (EMERGENCY)
Facility: HOSPITAL | Age: 48
Discharge: HOME | End: 2023-06-14
Attending: EMERGENCY MEDICINE
Payer: COMMERCIAL

## 2023-06-14 VITALS
SYSTOLIC BLOOD PRESSURE: 134 MMHG | DIASTOLIC BLOOD PRESSURE: 58 MMHG | RESPIRATION RATE: 18 BRPM | HEART RATE: 70 BPM | TEMPERATURE: 98.3 F | OXYGEN SATURATION: 100 %

## 2023-06-14 DIAGNOSIS — R10.9 NONSPECIFIC ABDOMINAL PAIN: Primary | ICD-10-CM

## 2023-06-14 LAB
ALBUMIN SERPL BCP-MCNC: 5 G/DL (ref 3.5–5)
ALP SERPL-CCNC: 97 U/L (ref 34–104)
ALT SERPL W P-5'-P-CCNC: 19 U/L (ref 7–52)
ANION GAP SERPL CALCULATED.3IONS-SCNC: 7 MMOL/L (ref 4–13)
AST SERPL W P-5'-P-CCNC: 15 U/L (ref 13–39)
BACTERIA UR QL AUTO: ABNORMAL /HPF
BASOPHILS # BLD AUTO: 0.07 THOUSANDS/ÂΜL (ref 0–0.1)
BASOPHILS NFR BLD AUTO: 1 % (ref 0–1)
BILIRUB SERPL-MCNC: 0.42 MG/DL (ref 0.2–1)
BILIRUB UR QL STRIP: NEGATIVE
BUN SERPL-MCNC: 12 MG/DL (ref 5–25)
CALCIUM SERPL-MCNC: 10 MG/DL (ref 8.4–10.2)
CHLORIDE SERPL-SCNC: 101 MMOL/L (ref 96–108)
CLARITY UR: CLEAR
CO2 SERPL-SCNC: 29 MMOL/L (ref 21–32)
COLOR UR: YELLOW
CREAT SERPL-MCNC: 0.92 MG/DL (ref 0.6–1.3)
EOSINOPHIL # BLD AUTO: 0.19 THOUSAND/ÂΜL (ref 0–0.61)
EOSINOPHIL NFR BLD AUTO: 2 % (ref 0–6)
ERYTHROCYTE [DISTWIDTH] IN BLOOD BY AUTOMATED COUNT: 14.1 % (ref 11.6–15.1)
GFR SERPL CREATININE-BSD FRML MDRD: 73 ML/MIN/1.73SQ M
GLUCOSE SERPL-MCNC: 90 MG/DL (ref 65–140)
GLUCOSE UR STRIP-MCNC: NEGATIVE MG/DL
HCT VFR BLD AUTO: 46.3 % (ref 34.8–46.1)
HGB BLD-MCNC: 15.2 G/DL (ref 11.5–15.4)
HGB UR QL STRIP.AUTO: ABNORMAL
IMM GRANULOCYTES # BLD AUTO: 0.03 THOUSAND/UL (ref 0–0.2)
IMM GRANULOCYTES NFR BLD AUTO: 0 % (ref 0–2)
KETONES UR STRIP-MCNC: NEGATIVE MG/DL
LEUKOCYTE ESTERASE UR QL STRIP: NEGATIVE
LIPASE SERPL-CCNC: 28 U/L (ref 11–82)
LYMPHOCYTES # BLD AUTO: 3.15 THOUSANDS/ÂΜL (ref 0.6–4.47)
LYMPHOCYTES NFR BLD AUTO: 29 % (ref 14–44)
MCH RBC QN AUTO: 29.5 PG (ref 26.8–34.3)
MCHC RBC AUTO-ENTMCNC: 32.8 G/DL (ref 31.4–37.4)
MCV RBC AUTO: 90 FL (ref 82–98)
MONOCYTES # BLD AUTO: 0.73 THOUSAND/ÂΜL (ref 0.17–1.22)
MONOCYTES NFR BLD AUTO: 7 % (ref 4–12)
NEUTROPHILS # BLD AUTO: 6.61 THOUSANDS/ÂΜL (ref 1.85–7.62)
NEUTS SEG NFR BLD AUTO: 61 % (ref 43–75)
NITRITE UR QL STRIP: NEGATIVE
NON-SQ EPI CELLS URNS QL MICRO: ABNORMAL /HPF
NRBC BLD AUTO-RTO: 0 /100 WBCS
PH UR STRIP.AUTO: 6.5 [PH]
PLATELET # BLD AUTO: 346 THOUSANDS/UL (ref 149–390)
PMV BLD AUTO: 10 FL (ref 8.9–12.7)
POTASSIUM SERPL-SCNC: 3.9 MMOL/L (ref 3.5–5.3)
PROT SERPL-MCNC: 8.6 G/DL (ref 6.4–8.4)
PROT UR STRIP-MCNC: NEGATIVE MG/DL
RBC # BLD AUTO: 5.16 MILLION/UL (ref 3.81–5.12)
RBC #/AREA URNS AUTO: ABNORMAL /HPF
SODIUM SERPL-SCNC: 137 MMOL/L (ref 135–147)
SP GR UR STRIP.AUTO: 1.02 (ref 1–1.03)
UROBILINOGEN UR STRIP-ACNC: <2 MG/DL
WBC # BLD AUTO: 10.78 THOUSAND/UL (ref 4.31–10.16)
WBC #/AREA URNS AUTO: ABNORMAL /HPF

## 2023-06-14 PROCEDURE — 81001 URINALYSIS AUTO W/SCOPE: CPT | Performed by: EMERGENCY MEDICINE

## 2023-06-14 PROCEDURE — 83690 ASSAY OF LIPASE: CPT | Performed by: EMERGENCY MEDICINE

## 2023-06-14 PROCEDURE — 85025 COMPLETE CBC W/AUTO DIFF WBC: CPT | Performed by: EMERGENCY MEDICINE

## 2023-06-14 PROCEDURE — G1004 CDSM NDSC: HCPCS

## 2023-06-14 PROCEDURE — 74177 CT ABD & PELVIS W/CONTRAST: CPT

## 2023-06-14 PROCEDURE — 80053 COMPREHEN METABOLIC PANEL: CPT | Performed by: EMERGENCY MEDICINE

## 2023-06-14 PROCEDURE — 36415 COLL VENOUS BLD VENIPUNCTURE: CPT | Performed by: EMERGENCY MEDICINE

## 2023-06-14 RX ORDER — DICYCLOMINE HCL 20 MG
20 TABLET ORAL 2 TIMES DAILY
Qty: 20 TABLET | Refills: 0 | Status: SHIPPED | OUTPATIENT
Start: 2023-06-14

## 2023-06-14 RX ORDER — ONDANSETRON 4 MG/1
4 TABLET, ORALLY DISINTEGRATING ORAL EVERY 6 HOURS PRN
Qty: 20 TABLET | Refills: 0 | Status: SHIPPED | OUTPATIENT
Start: 2023-06-14

## 2023-06-14 RX ORDER — ONDANSETRON 2 MG/ML
4 INJECTION INTRAMUSCULAR; INTRAVENOUS ONCE
Status: COMPLETED | OUTPATIENT
Start: 2023-06-14 | End: 2023-06-14

## 2023-06-14 RX ORDER — KETOROLAC TROMETHAMINE 30 MG/ML
15 INJECTION, SOLUTION INTRAMUSCULAR; INTRAVENOUS ONCE
Status: COMPLETED | OUTPATIENT
Start: 2023-06-14 | End: 2023-06-14

## 2023-06-14 RX ADMIN — SODIUM CHLORIDE 1000 ML: 0.9 INJECTION, SOLUTION INTRAVENOUS at 14:46

## 2023-06-14 RX ADMIN — KETOROLAC TROMETHAMINE 15 MG: 30 INJECTION, SOLUTION INTRAMUSCULAR; INTRAVENOUS at 14:45

## 2023-06-14 RX ADMIN — IOHEXOL 100 ML: 350 INJECTION, SOLUTION INTRAVENOUS at 15:46

## 2023-06-14 RX ADMIN — ONDANSETRON 4 MG: 2 INJECTION INTRAMUSCULAR; INTRAVENOUS at 14:45

## 2023-06-14 NOTE — ED PROVIDER NOTES
History  Chief Complaint   Patient presents with   • Possible UTI   • Flank Pain     Pt reports being treated for UTI/bladder infection over the weekend, pain now worsening and at bilateral flank regions  49 y/o female presents to the ED for abdominal pain and dysuria x 6 days  She states that she has had constant cramping pain worse in her upper abdomen and bloating  Nothing worsens or improves the pain  She states that she has dysuria but denies any other urinary symptoms  She was seen at urgent care Saturday and was given pyridium and macrobid for presumed UTI  States that dysuria has improved but abd pain continues  Also has had nausea but denies vomiting  She denies any hx of similar in the past  Denies fever, d/c, or vaginal complaints  No other complaints  History provided by:  Patient  Abdominal Pain  Pain location:  Generalized  Pain quality: cramping    Pain radiates to:  Does not radiate  Pain severity:  Mild  Onset quality:  Sudden  Duration:  6 days  Timing:  Constant  Progression:  Worsening  Chronicity:  New  Context: not sick contacts    Relieved by:  None tried  Worsened by:  Nothing  Ineffective treatments:  None tried  Associated symptoms: nausea    Associated symptoms: no chest pain, no chills, no cough, no diarrhea, no dysuria, no fever, no hematuria, no shortness of breath, no sore throat and no vomiting        Prior to Admission Medications   Prescriptions Last Dose Informant Patient Reported? Taking?    LORazepam (ATIVAN) 1 mg tablet  Self Yes No   Sig: Take 1 mg by mouth every 8 (eight) hours as needed for anxiety   QUEtiapine (SEROquel) 100 mg tablet  Self Yes No   Sig: take 1 AND 1/2 tablets by mouth daily if needed for insomnia   albuterol (2 5 mg/3 mL) 0 083 % nebulizer solution  Self No No   Sig: Take 6 mL (5 mg total) by nebulization every 6 (six) hours as needed for wheezing   dicyclomine (BENTYL) 20 mg tablet   No No   Sig: Take 1 tablet (20 mg total) by mouth 2 (two) times a day   famotidine-calcium carbonate-magnesium hydroxide (PEPCID COMPLETE) -165 MG CHEW   No No   Sig: Chew 1 tablet 2 (two) times a day for 25 doses   ondansetron (ZOFRAN-ODT) 4 mg disintegrating tablet   No No   Sig: Take 1 tablet (4 mg total) by mouth every 8 (eight) hours as needed for nausea or vomiting for up to 20 days   pantoprazole (PROTONIX) 40 mg tablet   No No   Sig: Take 1 tablet (40 mg total) by mouth 2 (two) times a day   venlafaxine (EFFEXOR-XR) 150 mg 24 hr capsule  Self Yes No   Sig: take 1 capsule by mouth at bedtime AFTER COMPLETION OF 75MG   venlafaxine (EFFEXOR-XR) 75 mg 24 hr capsule  Self Yes No   Sig: TAKE 1 CAPSULE ORALLY  DAILY WITH FOOD (WITH 150MG)      Facility-Administered Medications: None       Past Medical History:   Diagnosis Date   • Bladder perforation, intraoperative    • Depression    • Intestine, perforation (HCC)     intra op   • Sepsis ([facility] [address] )        Past Surgical History:   Procedure Laterality Date   • ABDOMINAL SURGERY     • BLADDER SURGERY     •  SECTION     • CT NEEDLE BIOPSY RETROPERITONEUM  1/3/2020   • HYSTERECTOMY     • INCISIONAL HERNIA REPAIR N/A 2021    Procedure: OPEN REPAIR VENTRAL/INCISIONAL HERNIA W/MESH;  Surgeon: Avila Hazel MD;  Location: [facility] [place] OR;  Service: General   • OOPHORECTOMY Bilateral        Family History   Problem Relation Age of Onset   • No Known Problems Mother    • Pancreatic cancer Father    • No Known Problems Sister    • No Known Problems Sister    • No Known Problems Sister    • No Known Problems Daughter    • Cervical cancer Maternal Grandmother    • No Known Problems Paternal Grandmother    • No Known Problems Maternal Aunt    • No Known Problems Maternal Aunt    • No Known Problems Maternal Aunt    • No Known Problems Maternal Aunt    • No Known Problems Paternal Aunt    • Pancreatic cancer Paternal Aunt    • Breast cancer Neg Hx    • Endometrial cancer Neg Hx    • Ovarian cancer Neg Hx      I have reviewed and agree with the history as documented  E-Cigarette/Vaping   • E-Cigarette Use Never User      E-Cigarette/Vaping Substances   • Nicotine No    • THC No    • CBD No    • Flavoring No    • Other No    • Unknown No      Social History     Tobacco Use   • Smoking status: Every Day     Packs/day: 0 50     Types: Cigarettes   • Smokeless tobacco: Never   Vaping Use   • Vaping Use: Never used   Substance Use Topics   • Alcohol use: Not Currently   • Drug use: Never       Review of Systems   Constitutional: Negative for chills and fever  HENT: Negative for congestion, ear pain and sore throat  Eyes: Negative for pain and visual disturbance  Respiratory: Negative for cough, shortness of breath and wheezing  Cardiovascular: Negative for chest pain and leg swelling  Gastrointestinal: Positive for abdominal pain and nausea  Negative for diarrhea and vomiting  Genitourinary: Negative for dysuria, frequency, hematuria and urgency  Musculoskeletal: Negative for neck pain and neck stiffness  Skin: Negative for rash and wound  Neurological: Negative for weakness, numbness and headaches  Psychiatric/Behavioral: Negative for agitation and confusion  All other systems reviewed and are negative  Physical Exam  Physical Exam  Vitals and nursing note reviewed  Constitutional:       Appearance: She is well-developed  HENT:      Head: Normocephalic and atraumatic  Eyes:      Pupils: Pupils are equal, round, and reactive to light  Cardiovascular:      Rate and Rhythm: Normal rate and regular rhythm  Pulmonary:      Effort: Pulmonary effort is normal       Breath sounds: Normal breath sounds  Abdominal:      General: Bowel sounds are normal       Palpations: Abdomen is soft  Tenderness: There is generalized abdominal tenderness  There is no guarding or rebound  Musculoskeletal:         General: Normal range of motion  Cervical back: Normal range of motion and neck supple  Skin:     General: Skin is warm and dry  Neurological:      General: No focal deficit present  Mental Status: She is alert and oriented to person, place, and time        Comments: No focal deficits         Vital Signs  ED Triage Vitals   Temperature Pulse Respirations Blood Pressure SpO2   06/14/23 1317 06/14/23 1317 06/14/23 1317 06/14/23 1317 06/14/23 1317   98 1 °F (36 7 °C) 89 18 128/83 98 %      Temp Source Heart Rate Source Patient Position - Orthostatic VS BP Location FiO2 (%)   06/14/23 1317 06/14/23 1317 06/14/23 1317 06/14/23 1317 --   Tympanic Monitor Sitting Left arm       Pain Score       06/14/23 1407       7           Vitals:    06/14/23 1317 06/14/23 1717   BP: 128/83 134/58   Pulse: 89 70   Patient Position - Orthostatic VS: Sitting Sitting         Visual Acuity      ED Medications  Medications   sodium chloride 0 9 % bolus 1,000 mL (0 mL Intravenous Stopped 6/14/23 1614)   ketorolac (TORADOL) injection 15 mg (15 mg Intravenous Given 6/14/23 1445)   ondansetron (ZOFRAN) injection 4 mg (4 mg Intravenous Given 6/14/23 1445)   iohexol (OMNIPAQUE) 350 MG/ML injection (SINGLE-DOSE) 100 mL (100 mL Intravenous Given 6/14/23 1546)       Diagnostic Studies  Results Reviewed     Procedure Component Value Units Date/Time    Urine Microscopic [[id number]]  (Abnormal) Collected: 06/14/23 1603    Lab Status: Final result Specimen: Urine, Clean Catch Updated: 06/14/23 1619     RBC, UA 30-50 /hpf      WBC, UA 1-2 /hpf      Epithelial Cells Occasional /hpf      Bacteria, UA Occasional /hpf     UA w Reflex to Microscopic w Reflex to Culture [[id number]]  (Abnormal) Collected: 06/14/23 1603    Lab Status: Final result Specimen: Urine, Clean Catch Updated: 06/14/23 1618     Color, UA Yellow     Clarity, UA Clear     Specific [place], UA 1 020     pH, UA 6 5     Leukocytes, UA Negative     Nitrite, UA Negative     Protein, UA Negative mg/dl      Glucose, UA Negative mg/dl      Ketones, UA Negative mg/dl Urobilinogen, UA <2 0 mg/dl      Bilirubin, UA Negative     Occult Blood, UA Small    Comprehensive metabolic panel [[id number]]  (Abnormal) Collected: 06/14/23 1448    Lab Status: Final result Specimen: Blood from Arm, Left Updated: 06/14/23 1516     Sodium 137 mmol/L      Potassium 3 9 mmol/L      Chloride 101 mmol/L      CO2 29 mmol/L      ANION GAP 7 mmol/L      BUN 12 mg/dL      Creatinine 0 92 mg/dL      Glucose 90 mg/dL      Calcium 10 0 mg/dL      AST 15 U/L      ALT 19 U/L      Alkaline Phosphatase 97 U/L      Total Protein 8 6 g/dL      Albumin 5 0 g/dL      Total Bilirubin 0 42 mg/dL      eGFR 73 ml/min/1 73sq m     Narrative:      National Kidney Disease Foundation guidelines for Chronic Kidney Disease (CKD):   •  Stage 1 with normal or high GFR (GFR > 90 mL/min/1 73 square meters)  •  Stage 2 Mild CKD (GFR = 60-89 mL/min/1 73 square meters)  •  Stage 3A Moderate CKD (GFR = 45-59 mL/min/1 73 square meters)  •  Stage 3B Moderate CKD (GFR = 30-44 mL/min/1 73 square meters)  •  Stage 4 Severe CKD (GFR = 15-29 mL/min/1 73 square meters)  •  Stage 5 End Stage CKD (GFR <15 mL/min/1 73 square meters)  Note: GFR calculation is accurate only with a steady state creatinine    Lipase [[id number]]  (Normal) Collected: 06/14/23 1448    Lab Status: Final result Specimen: Blood from Arm, Left Updated: 06/14/23 1516     Lipase 28 u/L     CBC and differential [[id number]]  (Abnormal) Collected: 06/14/23 1448    Lab Status: Final result Specimen: Blood from Arm, Left Updated: 06/14/23 1454     WBC 10 78 Thousand/uL      RBC 5 16 Million/uL      Hemoglobin 15 2 g/dL      Hematocrit 46 3 %      MCV 90 fL      MCH 29 5 pg      MCHC 32 8 g/dL      RDW 14 1 %      MPV 10 0 fL      Platelets 816 Thousands/uL      nRBC 0 /100 WBCs      Neutrophils Relative 61 %      Immat GRANS % 0 %      Lymphocytes Relative 29 %      Monocytes Relative 7 %      Eosinophils Relative 2 %      Basophils Relative 1 %      Neutrophils Absolute 6 61 Thousands/µL      Immature Grans Absolute 0 03 Thousand/uL      Lymphocytes Absolute 3 15 Thousands/µL      Monocytes Absolute 0 73 Thousand/µL      Eosinophils Absolute 0 19 Thousand/µL      Basophils Absolute 0 07 Thousands/µL                  CT abdomen pelvis with contrast   Final Result by Shobha Gale MD (06/14 1704)         1  Punctate nonobstructing right renal calculus  2  8 mm low-density lesion in the pancreatic head  Nonurgent evaluation with MRI abdomen with MRCP is recommended  3  Left fat-containing spigelian hernia  Study was marked in Epic for follow-up notification  Workstation performed: [id number]                    Procedures  Procedures         ED Course  ED Course as of 06/14/23 1738   Wed Jun 14, 2023   1420 Dysuria and diffuse upper abd pain that radiates to bilateral flanks  Also with dysuria- started Friday  Seen at urgent care Saturday and was given pyridium and for possible UTI - marobid which she started Saturday night  Initially felt better for a day but then symptoms returned  Cramping pain and bloating  Nausea  Constant  Worsens when laying on stomach  SBIRT 22yo+    Flowsheet Row Most Recent Value   Initial Alcohol Screen: US AUDIT-C     1  How often do you have a drink containing alcohol? 0 Filed at: 06/14/2023 1408   2  How many drinks containing alcohol do you have on a typical day you are drinking? 0 Filed at: 06/14/2023 1408   3b  FEMALE Any Age, or MALE 65+: How often do you have 4 or more drinks on one occassion? 0 Filed at: 06/14/2023 1408   Audit-C Score 0 Filed at: 06/14/2023 1408   HARVINDER: How many times in the past year have you    Used an illegal drug or used a prescription medication for non-medical reasons? Never Filed at: 06/14/2023 1408                    Medical Decision Making  49 y/o female with abd pain and nausea- will get labs, UA, and ct abd/pel  Will give toradol and zofran   Will reassess     Labs, UA, and ct scan reviewed- all unremarkable  Will give bentherbert maldonadofran for home and instruct follow up with PCP     Nonspecific abdominal pain: acute illness or injury  Amount and/or Complexity of Data Reviewed  Labs: ordered  Radiology: ordered  Risk  Prescription drug management  Disposition  Final diagnoses:   Nonspecific abdominal pain     Time reflects when diagnosis was documented in both MDM as applicable and the Disposition within this note     Time User Action Codes Description Comment    6/14/2023  5:25 PM Leela Click A Add [R10 9] Nonspecific abdominal pain       ED Disposition     ED Disposition   Discharge    Condition   Stable    Date/Time   Wed Jun 14, 2023  5:17 PM    Comment   Amber Landry discharge to home/self care  Follow-up Information     Follow up With Specialties Details Why Contact Info Additional Information    Elda Rob DO Family Medicine Call in 1 day for follow up within 2-3 days [address] [address] Emergency Department Emergency Medicine Go to  immediately for any new or worsening symptoms [facility] [address] [place] [address] Emergency Department, [address] [place] [address], [facility]          Discharge Medication List as of 6/14/2023  5:27 PM      START taking these medications    Details   !! dicyclomine (BENTYL) 20 mg tablet Take 1 tablet (20 mg total) by mouth 2 (two) times a day, Starting Wed 6/14/2023, Print       !! - Potential duplicate medications found  Please discuss with provider        CONTINUE these medications which have CHANGED    Details   ondansetron (ZOFRAN-ODT) 4 mg disintegrating tablet Take 1 tablet (4 mg total) by mouth every 6 (six) hours as needed for nausea or vomiting, Starting Wed 6/14/2023, Print         CONTINUE these medications which have NOT CHANGED    Details albuterol (2 5 mg/3 mL) 0 083 % nebulizer solution Take 6 mL (5 mg total) by nebulization every 6 (six) hours as needed for wheezing, Starting Mon 3/28/2022, Normal      !! dicyclomine (BENTYL) 20 mg tablet Take 1 tablet (20 mg total) by mouth 2 (two) times a day, Starting Wed 12/21/2022, Normal      famotidine-calcium carbonate-magnesium hydroxide (PEPCID COMPLETE) -165 MG CHEW Chew 1 tablet 2 (two) times a day for 25 doses, Starting Tue 1/17/2023, Until Mon 1/30/2023, Normal      LORazepam (ATIVAN) 1 mg tablet Take 1 mg by mouth every 8 (eight) hours as needed for anxiety, Historical Med      pantoprazole (PROTONIX) 40 mg tablet Take 1 tablet (40 mg total) by mouth 2 (two) times a day, Starting Thu 1/26/2023, Normal      QUEtiapine (SEROquel) 100 mg tablet take 1 AND 1/2 tablets by mouth daily if needed for insomnia, Historical Med      !! venlafaxine (EFFEXOR-XR) 150 mg 24 hr capsule take 1 capsule by mouth at bedtime AFTER COMPLETION OF 75MG, Historical Med      !! venlafaxine (EFFEXOR-XR) 75 mg 24 hr capsule TAKE 1 CAPSULE ORALLY  DAILY WITH FOOD (WITH 150MG), Historical Med       !! - Potential duplicate medications found  Please discuss with provider  No discharge procedures on file      PDMP Review     None          ED Provider  Electronically Signed by           Maye Foster DO  06/14/23 8827

## 2023-07-17 RX ORDER — TRAZODONE HYDROCHLORIDE 100 MG/1
TABLET ORAL
COMMUNITY
Start: 2023-05-13 | End: 2023-07-18

## 2023-07-17 RX ORDER — METOCLOPRAMIDE 10 MG/1
10 TABLET ORAL EVERY 6 HOURS PRN
COMMUNITY
Start: 2023-07-11 | End: 2023-07-19

## 2023-07-18 ENCOUNTER — OFFICE VISIT (OUTPATIENT)
Age: 48
End: 2023-07-18
Payer: COMMERCIAL

## 2023-07-18 VITALS
SYSTOLIC BLOOD PRESSURE: 108 MMHG | RESPIRATION RATE: 18 BRPM | BODY MASS INDEX: 36.24 KG/M2 | DIASTOLIC BLOOD PRESSURE: 71 MMHG | HEART RATE: 86 BPM | TEMPERATURE: 96.7 F | OXYGEN SATURATION: 96 % | WEIGHT: 191.8 LBS

## 2023-07-18 DIAGNOSIS — R06.2 WHEEZING: ICD-10-CM

## 2023-07-18 DIAGNOSIS — Z11.59 NEED FOR HEPATITIS C SCREENING TEST: ICD-10-CM

## 2023-07-18 DIAGNOSIS — Z72.0 TOBACCO USE: ICD-10-CM

## 2023-07-18 DIAGNOSIS — K58.1 IRRITABLE BOWEL SYNDROME WITH CONSTIPATION: ICD-10-CM

## 2023-07-18 DIAGNOSIS — Z12.31 ENCOUNTER FOR SCREENING MAMMOGRAM FOR MALIGNANT NEOPLASM OF BREAST: ICD-10-CM

## 2023-07-18 DIAGNOSIS — Z00.00 ANNUAL PHYSICAL EXAM: Primary | ICD-10-CM

## 2023-07-18 PROCEDURE — 99396 PREV VISIT EST AGE 40-64: CPT | Performed by: FAMILY MEDICINE

## 2023-07-18 PROCEDURE — 99406 BEHAV CHNG SMOKING 3-10 MIN: CPT | Performed by: FAMILY MEDICINE

## 2023-07-18 RX ORDER — ESZOPICLONE 3 MG/1
TABLET, FILM COATED ORAL
COMMUNITY
Start: 2023-07-11

## 2023-07-18 RX ORDER — SENNOSIDES A AND B 8.6 MG/1
1 TABLET, FILM COATED ORAL DAILY
Qty: 30 TABLET | Refills: 0 | Status: SHIPPED | OUTPATIENT
Start: 2023-07-18

## 2023-07-18 NOTE — PROGRESS NOTES
ADULT ANNUAL [facility] CARE [place]    NAME: Glory Rice  AGE: 50 y.o. SEX: female  : 1975     DATE: 2023     Assessment and Plan:     Problem List Items Addressed This Visit    None  Visit Diagnoses     Annual physical exam    -  Primary    Relevant Orders    Comprehensive metabolic panel    CBC and differential    Irritable bowel syndrome with constipation    - pt to use otc milk of magnesia and continue with therapy below     Relevant Medications    senna (SENOKOT) 8.6 MG tablet    Encounter for screening mammogram for malignant neoplasm of breast        Relevant Orders    Mammo screening bilateral w 3d & cad    Wheezing    -  Possible COPD. PFT ordered. Pt given albuterol for symptoms relief. Tobacco use        Need for hepatitis C screening test        Relevant Orders    Hepatitis C Antibody          Immunizations and preventive care screenings were discussed with patient today. Appropriate education was printed on patient's after visit summary. Counseling:  · Exercise: the importance of regular exercise/physical activity was discussed. Recommend exercise 3-5 times per week for at least 30 minutes. BMI Counseling: Body mass index is 36.24 kg/m². The BMI is above normal. Nutrition recommendations include limiting drinks that contain sugar. Exercise recommendations include exercising 3-5 times per week. Rationale for BMI follow-up plan is due to patient being overweight or obese. Tobacco Cessation Counseling: Tobacco cessation counseling was provided. The patient is sincerely urged to quit consumption of tobacco. She is ready to quit tobacco. Medication options discussed. Patient refused medication. 4 minutes of counseling provided. No follow-ups on file. Chief Complaint:     Chief Complaint   Patient presents with   • Follow-up     Pt is here for an after ER visit . Went to the ER twice this month.  Has also been having some constipation issues. History of Present Illness:     Adult Annual Physical   Patient here for a comprehensive physical exam. The patient reports constipation. Diet and Physical Activity  · Diet/Nutrition: poor diet. · Exercise: no formal exercise. Depression Screening  PHQ-2/9 Depression Screening    Little interest or pleasure in doing things: 0 - not at all  Feeling down, depressed, or hopeless: 0 - not at all  Trouble falling or staying asleep, or sleeping too much: 1 - several days  Feeling tired or having little energy: 1 - several days  Poor appetite or overeatin - several days  Feeling bad about yourself - or that you are a failure or have let yourself or your family down: 0 - not at all  Trouble concentrating on things, such as reading the newspaper or watching television: 0 - not at all  Moving or speaking so slowly that other people could have noticed. Or the opposite - being so fidgety or restless that you have been moving around a lot more than usual: 0 - not at all  Thoughts that you would be better off dead, or of hurting yourself in some way: 0 - not at all  PHQ-9 Score: 3   PHQ-9 Interpretation: No or Minimal depression        General Health  · Sleep: sleeps well. · Hearing: normal - bilateral.  · Vision: no vision problems. · Dental: brushes teeth once daily. /GYN Health  · Patient is:s/p hysterectomy        Review of Systems:     Review of Systems   Constitutional: Negative for fever. Respiratory: Positive for shortness of breath and wheezing. Cardiovascular: Negative for chest pain. Gastrointestinal: Positive for abdominal distention, constipation and nausea.       Past Medical History:     Past Medical History:   Diagnosis Date   • Bladder perforation, intraoperative    • Depression    • Intestine, perforation ([address])     intra op   • Sepsis [facility])       Past Surgical History:     Past Surgical History:   Procedure Laterality Date   • ABDOMINAL SURGERY     • BLADDER SURGERY     •  SECTION     • CT NEEDLE BIOPSY RETROPERITONEUM  1/3/2020   • HYSTERECTOMY  2015   • INCISIONAL HERNIA REPAIR N/A 2021    Procedure: OPEN REPAIR VENTRAL/INCISIONAL HERNIA W/MESH;  Surgeon: Roro Lr MD;  Location: [facility] MAIN OR;  Service: General   • OOPHORECTOMY Bilateral 2015      Social History:     Social History     Socioeconomic History   • Marital status: /Civil Union     Spouse name: None   • Number of children: None   • Years of education: None   • Highest education level: None   Occupational History   • None   Tobacco Use   • Smoking status: Every Day     Packs/day: 0.50     Types: Cigarettes   • Smokeless tobacco: Never   Vaping Use   • Vaping Use: Never used   Substance and Sexual Activity   • Alcohol use: Not Currently   • Drug use: Never   • Sexual activity: None   Other Topics Concern   • None   Social History Narrative   • None     Social Determinants of Health     Financial Resource Strain: Not on file   Food Insecurity: Not on file   Transportation Needs: Not on file   Physical Activity: Not on file   Stress: Not on file   Social Connections: Not on file   Intimate Partner Violence: Not on file   Housing Stability: Not on file      Family History:     Family History   Problem Relation Age of Onset   • No Known Problems Mother    • Pancreatic cancer Father    • No Known Problems Sister    • No Known Problems Sister    • No Known Problems Sister    • No Known Problems Daughter    • Cervical cancer Maternal Grandmother    • No Known Problems Paternal Grandmother    • No Known Problems Maternal Aunt    • No Known Problems Maternal Aunt    • No Known Problems Maternal Aunt    • No Known Problems Maternal Aunt    • No Known Problems Paternal Aunt    • Pancreatic cancer Paternal Aunt    • Breast cancer Neg Hx    • Endometrial cancer Neg Hx    • Ovarian cancer Neg Hx       Current Medications:     Current Outpatient Medications   Medication Sig Dispense Refill   • dicyclomine (BENTYL) 20 mg tablet Take 1 tablet (20 mg total) by mouth 2 (two) times a day 40 tablet 1   • famotidine-calcium carbonate-magnesium hydroxide (PEPCID COMPLETE) -165 MG CHEW Chew 1 tablet 2 (two) times a day for 25 doses 25 tablet 0   • pantoprazole (PROTONIX) 40 mg tablet Take 1 tablet (40 mg total) by mouth 2 (two) times a day 180 tablet 3   • QUEtiapine (SEROquel) 100 mg tablet take 1 AND 1/2 tablets by mouth daily if needed for insomnia     • senna (SENOKOT) 8.6 MG tablet Take 1 tablet (8.6 mg total) by mouth daily 30 tablet 0   • venlafaxine (EFFEXOR-XR) 150 mg 24 hr capsule take 1 capsule by mouth at bedtime AFTER COMPLETION OF 75MG     • venlafaxine (EFFEXOR-XR) 75 mg 24 hr capsule TAKE 1 CAPSULE ORALLY  DAILY WITH FOOD (WITH 150MG)     • albuterol (2.5 mg/3 mL) 0.083 % nebulizer solution Take 6 mL (5 mg total) by nebulization every 6 (six) hours as needed for wheezing (Patient not taking: Reported on 7/18/2023) 75 mL 0   • dicyclomine (BENTYL) 20 mg tablet Take 1 tablet (20 mg total) by mouth 2 (two) times a day 20 tablet 0   • eszopiclone (LUNESTA) 3 MG tablet take 1 tablet by mouth daily immediately BEFORE BEDTIME     • metoclopramide (REGLAN) 10 mg tablet Take 10 mg by mouth every 6 (six) hours as needed (Patient not taking: Reported on 7/18/2023)     • ondansetron (ZOFRAN-ODT) 4 mg disintegrating tablet Take 1 tablet (4 mg total) by mouth every 8 (eight) hours as needed for nausea or vomiting for up to 20 days 20 tablet 0   • ondansetron (ZOFRAN-ODT) 4 mg disintegrating tablet Take 1 tablet (4 mg total) by mouth every 6 (six) hours as needed for nausea or vomiting (Patient not taking: Reported on 7/18/2023) 20 tablet 0     No current facility-administered medications for this visit.       Allergies:     No Known Allergies   Physical Exam:     /71 (BP Location: Right arm, Patient Position: Sitting, Cuff Size: Large)   Pulse 86   Temp (!) 96.7 °F (35.9 °C) (Tympanic)   Resp 18   Wt 87 kg (191 lb 12.8 oz)   SpO2 96%   BMI 36.24 kg/m²     Physical Exam  HENT:      Head: Normocephalic. Right Ear: External ear normal.      Left Ear: External ear normal.   Eyes:      Conjunctiva/sclera: Conjunctivae normal.      Pupils: Pupils are equal, round, and reactive to light. Cardiovascular:      Rate and Rhythm: Normal rate and regular rhythm. Pulmonary:      Effort: Pulmonary effort is normal.      Breath sounds: Normal breath sounds. Abdominal:      General: Bowel sounds are decreased. Palpations: Abdomen is soft. Tenderness: There is no abdominal tenderness. Musculoskeletal:      Right lower leg: No edema. Left lower leg: No edema. Neurological:      Mental Status: She is alert and oriented to person, place, and time.    Psychiatric:         Mood and Affect: Mood normal.         Behavior: Behavior normal.          Lianne Balbuena DO  [address]

## 2023-07-19 ENCOUNTER — OFFICE VISIT (OUTPATIENT)
Dept: SURGERY | Facility: CLINIC | Age: 48
End: 2023-07-19
Payer: COMMERCIAL

## 2023-07-19 ENCOUNTER — TELEPHONE (OUTPATIENT)
Age: 48
End: 2023-07-19

## 2023-07-19 VITALS
HEIGHT: 61 IN | DIASTOLIC BLOOD PRESSURE: 70 MMHG | BODY MASS INDEX: 34.89 KG/M2 | RESPIRATION RATE: 18 BRPM | SYSTOLIC BLOOD PRESSURE: 126 MMHG | HEART RATE: 91 BPM | OXYGEN SATURATION: 97 % | TEMPERATURE: 98.1 F | WEIGHT: 184.8 LBS

## 2023-07-19 DIAGNOSIS — K43.9 SPIGELIAN HERNIA: Primary | ICD-10-CM

## 2023-07-19 PROCEDURE — 99214 OFFICE O/P EST MOD 30 MIN: CPT | Performed by: SURGERY

## 2023-07-19 NOTE — PROGRESS NOTES
Assessment/Plan:  CT Results 7/11/23:  Abdomen:   Lung Bases: Visualized lung bases are clear. Liver: Normal.     Gallbladder/Bile ducts: Normal.     Spleen: Normal.     Pancreas: No acute abnormalities. 9 mm hypodensity noted in the proximal   pancreatic body. Adrenal glands: Normal.     Kidneys/Ureters: Unremarkable. Bowel/Mesentery: No acute bowel abnormalities. No bowel obstruction. No evidence   for acute appendicitis. No free fluid or air in the abdomen. Lymph nodes: No pathologically enlarged nodes     Vessels: No acute abnormalities     Abdominal Wall: Fat-containing hernia of the left lateral lower abdominal/pelvic   wall, image 173 for example.       Pelvis:   No free fluid or air in the pelvis. No pelvic lymphadenopathy. Unremarkable   urinary bladder. Impression    Impression:   1. Anterior left lower abdominal/pelvic wall hernia containing fat. 2. 9 mm hypodensity in the proximal pancreatic body which may be further   characterized with MRI. 1. Spigelian hernia      She has developed a spigelian hernia in the left lower quadrant since she was last seen. We discussed two modifiable risk factors for her regarding developing an additional hernia - weight gain and smoking. She will need to quit smoking before we can pursue repair of this hernia. She can contact her PCP and make an appointment to discuss options for aid in quitting. Followup in 3 months to discuss progress. Subjective:      Patient ID: Raisa Ro is a 50 y.o. female. Triage Notes:    Abdominal pain and constipation. Pain, nausea. Vomiting.        The following portions of the patient's history were reviewed and updated as appropriate: allergies, current medications, past family history, past medical history, past social history, past surgical history and problem list.    Review of Systems      Objective:      /70 (BP Location: Right arm, Patient Position: Sitting, Cuff Size: Standard) Pulse 91   Temp 98.1 °F (36.7 °C)   Resp 18   Ht 5' 1" (1.549 m)   Wt 83.8 kg (184 lb 12.8 oz)   SpO2 97%   BMI 34.92 kg/m²     Below is the patient's most recent value for Albumin, ALT, AST, BUN, Calcium, Chloride, Cholesterol, CO2, Creatinine, GFR, Glucose, HDL, Hematocrit, Hemoglobin, Hemoglobin A1C, LDL, Magnesium, Phosphorus, Platelets, Potassium, PSA, Sodium, Triglycerides, and WBC. Lab Results   Component Value Date    ALT 12 07/26/2023    AST 15 07/26/2023    BUN 16 07/26/2023    CALCIUM 10.6 (H) 07/26/2023     07/26/2023    CO2 27 07/26/2023    CREATININE 0.99 07/26/2023    HDL 54 02/25/2021    HCT 46.1 07/26/2023    HGB 15.2 07/26/2023    MG 2.5 05/29/2022     07/26/2023    K 3.7 07/26/2023    TRIG 135 02/25/2021    WBC 10.90 (H) 07/26/2023     Note: for a comprehensive list of the patient's lab results, access the Results Review activity.      Physical Exam        Procedures

## 2023-07-20 ENCOUNTER — TELEPHONE (OUTPATIENT)
Dept: CARDIOLOGY CLINIC | Facility: CLINIC | Age: 48
End: 2023-07-20

## 2023-07-20 NOTE — TELEPHONE ENCOUNTER
Received mail from Nasrin Aceves [facility], [address]. Requesting patient medical records. Faxed to [phone] [facility]    Confirmation received.

## 2023-07-21 ENCOUNTER — TELEPHONE (OUTPATIENT)
Age: 48
End: 2023-07-21

## 2023-07-21 NOTE — TELEPHONE ENCOUNTER
Received request for medical records from [facility], [facility] # [id number]    Faxed on 7/21/2023 to [facility] phone [phone]

## 2023-07-25 ENCOUNTER — HOSPITAL ENCOUNTER (EMERGENCY)
Facility: HOSPITAL | Age: 48
Discharge: HOME | End: 2023-07-26
Attending: EMERGENCY MEDICINE
Payer: COMMERCIAL

## 2023-07-25 ENCOUNTER — APPOINTMENT (OUTPATIENT)
Age: 48
End: 2023-07-25
Payer: COMMERCIAL

## 2023-07-25 VITALS
OXYGEN SATURATION: 100 % | BODY MASS INDEX: 33.99 KG/M2 | WEIGHT: 180 LBS | RESPIRATION RATE: 18 BRPM | DIASTOLIC BLOOD PRESSURE: 84 MMHG | TEMPERATURE: 97.7 F | HEIGHT: 61 IN | HEART RATE: 100 BPM | SYSTOLIC BLOOD PRESSURE: 116 MMHG

## 2023-07-25 DIAGNOSIS — R11.2 NAUSEA AND VOMITING, UNSPECIFIED VOMITING TYPE: ICD-10-CM

## 2023-07-25 DIAGNOSIS — Z00.00 ANNUAL PHYSICAL EXAM: ICD-10-CM

## 2023-07-25 DIAGNOSIS — R10.84 DIFFUSE ABDOMINAL PAIN: Primary | ICD-10-CM

## 2023-07-25 DIAGNOSIS — Z11.59 NEED FOR HEPATITIS C SCREENING TEST: ICD-10-CM

## 2023-07-25 LAB
ALBUMIN SERPL BCP-MCNC: 4.1 G/DL (ref 3.5–5)
ALP SERPL-CCNC: 115 U/L (ref 46–116)
ALT SERPL W P-5'-P-CCNC: 18 U/L (ref 12–78)
ANION GAP SERPL CALCULATED.3IONS-SCNC: 5 MMOL/L
AST SERPL W P-5'-P-CCNC: 15 U/L (ref 5–45)
BASOPHILS # BLD AUTO: 0.03 THOUSANDS/ÂΜL (ref 0–0.1)
BASOPHILS NFR BLD AUTO: 0 % (ref 0–1)
BILIRUB SERPL-MCNC: 0.4 MG/DL (ref 0.2–1)
BUN SERPL-MCNC: 12 MG/DL (ref 5–25)
CALCIUM SERPL-MCNC: 10.2 MG/DL (ref 8.3–10.1)
CHLORIDE SERPL-SCNC: 107 MMOL/L (ref 96–108)
CO2 SERPL-SCNC: 27 MMOL/L (ref 21–32)
CREAT SERPL-MCNC: 1.03 MG/DL (ref 0.6–1.3)
EOSINOPHIL # BLD AUTO: 0.85 THOUSAND/ÂΜL (ref 0–0.61)
EOSINOPHIL NFR BLD AUTO: 8 % (ref 0–6)
ERYTHROCYTE [DISTWIDTH] IN BLOOD BY AUTOMATED COUNT: 14.5 % (ref 11.6–15.1)
GFR SERPL CREATININE-BSD FRML MDRD: 63 ML/MIN/1.73SQ M
GLUCOSE P FAST SERPL-MCNC: 92 MG/DL (ref 65–99)
HCT VFR BLD AUTO: 47.4 % (ref 34.8–46.1)
HCV AB SER QL: NORMAL
HGB BLD-MCNC: 15.1 G/DL (ref 11.5–15.4)
IMM GRANULOCYTES # BLD AUTO: 0.04 THOUSAND/UL (ref 0–0.2)
IMM GRANULOCYTES NFR BLD AUTO: 0 % (ref 0–2)
LYMPHOCYTES # BLD AUTO: 2.75 THOUSANDS/ÂΜL (ref 0.6–4.47)
LYMPHOCYTES NFR BLD AUTO: 26 % (ref 14–44)
MCH RBC QN AUTO: 28.9 PG (ref 26.8–34.3)
MCHC RBC AUTO-ENTMCNC: 31.9 G/DL (ref 31.4–37.4)
MCV RBC AUTO: 91 FL (ref 82–98)
MONOCYTES # BLD AUTO: 0.73 THOUSAND/ÂΜL (ref 0.17–1.22)
MONOCYTES NFR BLD AUTO: 7 % (ref 4–12)
NEUTROPHILS # BLD AUTO: 6.06 THOUSANDS/ÂΜL (ref 1.85–7.62)
NEUTS SEG NFR BLD AUTO: 59 % (ref 43–75)
NRBC BLD AUTO-RTO: 0 /100 WBCS
PLATELET # BLD AUTO: 376 THOUSANDS/UL (ref 149–390)
PMV BLD AUTO: 10.6 FL (ref 8.9–12.7)
POTASSIUM SERPL-SCNC: 4.7 MMOL/L (ref 3.5–5.3)
PROT SERPL-MCNC: 8.5 G/DL (ref 6.4–8.4)
RBC # BLD AUTO: 5.23 MILLION/UL (ref 3.81–5.12)
SODIUM SERPL-SCNC: 139 MMOL/L (ref 135–147)
WBC # BLD AUTO: 10.46 THOUSAND/UL (ref 4.31–10.16)

## 2023-07-25 PROCEDURE — 99284 EMERGENCY DEPT VISIT MOD MDM: CPT | Performed by: PHYSICIAN ASSISTANT

## 2023-07-25 PROCEDURE — 93005 ELECTROCARDIOGRAM TRACING: CPT

## 2023-07-25 PROCEDURE — 85025 COMPLETE CBC W/AUTO DIFF WBC: CPT

## 2023-07-25 PROCEDURE — 80053 COMPREHEN METABOLIC PANEL: CPT

## 2023-07-25 PROCEDURE — 36415 COLL VENOUS BLD VENIPUNCTURE: CPT

## 2023-07-25 PROCEDURE — 86803 HEPATITIS C AB TEST: CPT

## 2023-07-25 RX ORDER — KETOROLAC TROMETHAMINE 30 MG/ML
15 INJECTION, SOLUTION INTRAMUSCULAR; INTRAVENOUS ONCE
Status: DISCONTINUED | OUTPATIENT
Start: 2023-07-26 | End: 2023-07-25

## 2023-07-25 RX ORDER — DIPHENHYDRAMINE HYDROCHLORIDE 50 MG/ML
25 INJECTION INTRAMUSCULAR; INTRAVENOUS ONCE
Status: DISCONTINUED | OUTPATIENT
Start: 2023-07-26 | End: 2023-07-25

## 2023-07-25 RX ORDER — METOCLOPRAMIDE HYDROCHLORIDE 5 MG/ML
10 INJECTION INTRAMUSCULAR; INTRAVENOUS ONCE
Status: DISCONTINUED | OUTPATIENT
Start: 2023-07-26 | End: 2023-07-25

## 2023-07-26 ENCOUNTER — APPOINTMENT (EMERGENCY)
Dept: CT IMAGING | Facility: HOSPITAL | Age: 48
End: 2023-07-26
Payer: COMMERCIAL

## 2023-07-26 LAB
ALBUMIN SERPL BCP-MCNC: 5.1 G/DL (ref 3.5–5)
ALP SERPL-CCNC: 89 U/L (ref 34–104)
ALT SERPL W P-5'-P-CCNC: 12 U/L (ref 7–52)
ANION GAP SERPL CALCULATED.3IONS-SCNC: 9 MMOL/L
AST SERPL W P-5'-P-CCNC: 15 U/L (ref 13–39)
ATRIAL RATE: 106 BPM
ATRIAL RATE: 89 BPM
BACTERIA UR QL AUTO: ABNORMAL /HPF
BASOPHILS # BLD AUTO: 0.03 THOUSANDS/ÂΜL (ref 0–0.1)
BASOPHILS NFR BLD AUTO: 0 % (ref 0–1)
BILIRUB SERPL-MCNC: 0.4 MG/DL (ref 0.2–1)
BILIRUB UR QL STRIP: NEGATIVE
BUN SERPL-MCNC: 16 MG/DL (ref 5–25)
CALCIUM SERPL-MCNC: 10.6 MG/DL (ref 8.4–10.2)
CHLORIDE SERPL-SCNC: 102 MMOL/L (ref 96–108)
CLARITY UR: CLEAR
CO2 SERPL-SCNC: 27 MMOL/L (ref 21–32)
COLOR UR: ABNORMAL
CREAT SERPL-MCNC: 0.99 MG/DL (ref 0.6–1.3)
EOSINOPHIL # BLD AUTO: 0.76 THOUSAND/ÂΜL (ref 0–0.61)
EOSINOPHIL NFR BLD AUTO: 7 % (ref 0–6)
ERYTHROCYTE [DISTWIDTH] IN BLOOD BY AUTOMATED COUNT: 14.3 % (ref 11.6–15.1)
GFR SERPL CREATININE-BSD FRML MDRD: 67 ML/MIN/1.73SQ M
GLUCOSE SERPL-MCNC: 110 MG/DL (ref 65–140)
GLUCOSE UR STRIP-MCNC: NEGATIVE MG/DL
HCT VFR BLD AUTO: 46.1 % (ref 34.8–46.1)
HGB BLD-MCNC: 15.2 G/DL (ref 11.5–15.4)
HGB UR QL STRIP.AUTO: ABNORMAL
IMM GRANULOCYTES # BLD AUTO: 0.05 THOUSAND/UL (ref 0–0.2)
IMM GRANULOCYTES NFR BLD AUTO: 1 % (ref 0–2)
KETONES UR STRIP-MCNC: NEGATIVE MG/DL
LEUKOCYTE ESTERASE UR QL STRIP: NEGATIVE
LIPASE SERPL-CCNC: 34 U/L (ref 11–82)
LYMPHOCYTES # BLD AUTO: 2.92 THOUSANDS/ÂΜL (ref 0.6–4.47)
LYMPHOCYTES NFR BLD AUTO: 27 % (ref 14–44)
MCH RBC QN AUTO: 29.5 PG (ref 26.8–34.3)
MCHC RBC AUTO-ENTMCNC: 33 G/DL (ref 31.4–37.4)
MCV RBC AUTO: 89 FL (ref 82–98)
MONOCYTES # BLD AUTO: 0.66 THOUSAND/ÂΜL (ref 0.17–1.22)
MONOCYTES NFR BLD AUTO: 6 % (ref 4–12)
MUCOUS THREADS UR QL AUTO: ABNORMAL
NEUTROPHILS # BLD AUTO: 6.48 THOUSANDS/ÂΜL (ref 1.85–7.62)
NEUTS SEG NFR BLD AUTO: 59 % (ref 43–75)
NITRITE UR QL STRIP: NEGATIVE
NON-SQ EPI CELLS URNS QL MICRO: ABNORMAL /HPF
NRBC BLD AUTO-RTO: 0 /100 WBCS
P AXIS: 76 DEGREES
P AXIS: 78 DEGREES
PH UR STRIP.AUTO: 5.5 [PH]
PLATELET # BLD AUTO: 305 THOUSANDS/UL (ref 149–390)
PMV BLD AUTO: 10.3 FL (ref 8.9–12.7)
POTASSIUM SERPL-SCNC: 3.7 MMOL/L (ref 3.5–5.3)
PR INTERVAL: 140 MS
PR INTERVAL: 142 MS
PROT SERPL-MCNC: 7.9 G/DL (ref 6.4–8.4)
PROT UR STRIP-MCNC: NEGATIVE MG/DL
QRS AXIS: 54 DEGREES
QRS AXIS: 56 DEGREES
QRSD INTERVAL: 72 MS
QRSD INTERVAL: 74 MS
QT INTERVAL: 348 MS
QT INTERVAL: 366 MS
QTC INTERVAL: 445 MS
QTC INTERVAL: 462 MS
RBC # BLD AUTO: 5.16 MILLION/UL (ref 3.81–5.12)
RBC #/AREA URNS AUTO: ABNORMAL /HPF
SODIUM SERPL-SCNC: 138 MMOL/L (ref 135–147)
SP GR UR STRIP.AUTO: 1.01 (ref 1–1.03)
T WAVE AXIS: 26 DEGREES
T WAVE AXIS: 26 DEGREES
UROBILINOGEN UR STRIP-ACNC: <2 MG/DL
VENTRICULAR RATE: 106 BPM
VENTRICULAR RATE: 89 BPM
WBC # BLD AUTO: 10.9 THOUSAND/UL (ref 4.31–10.16)
WBC #/AREA URNS AUTO: ABNORMAL /HPF

## 2023-07-26 PROCEDURE — 93010 ELECTROCARDIOGRAM REPORT: CPT | Performed by: INTERNAL MEDICINE

## 2023-07-26 PROCEDURE — 36415 COLL VENOUS BLD VENIPUNCTURE: CPT | Performed by: PHYSICIAN ASSISTANT

## 2023-07-26 PROCEDURE — G1004 CDSM NDSC: HCPCS

## 2023-07-26 PROCEDURE — 80053 COMPREHEN METABOLIC PANEL: CPT | Performed by: PHYSICIAN ASSISTANT

## 2023-07-26 PROCEDURE — 83690 ASSAY OF LIPASE: CPT | Performed by: PHYSICIAN ASSISTANT

## 2023-07-26 PROCEDURE — 74177 CT ABD & PELVIS W/CONTRAST: CPT

## 2023-07-26 PROCEDURE — 81001 URINALYSIS AUTO W/SCOPE: CPT | Performed by: PHYSICIAN ASSISTANT

## 2023-07-26 PROCEDURE — 85025 COMPLETE CBC W/AUTO DIFF WBC: CPT | Performed by: PHYSICIAN ASSISTANT

## 2023-07-26 RX ORDER — KETOROLAC TROMETHAMINE 30 MG/ML
15 INJECTION, SOLUTION INTRAMUSCULAR; INTRAVENOUS ONCE
Status: COMPLETED | OUTPATIENT
Start: 2023-07-26 | End: 2023-07-26

## 2023-07-26 RX ORDER — ONDANSETRON 4 MG/1
4 TABLET, FILM COATED ORAL EVERY 8 HOURS PRN
Qty: 20 TABLET | Refills: 0 | Status: SHIPPED | OUTPATIENT
Start: 2023-07-26

## 2023-07-26 RX ORDER — MORPHINE SULFATE 4 MG/ML
4 INJECTION, SOLUTION INTRAMUSCULAR; INTRAVENOUS ONCE
Status: COMPLETED | OUTPATIENT
Start: 2023-07-26 | End: 2023-07-26

## 2023-07-26 RX ORDER — ACETAMINOPHEN 500 MG
1000 TABLET ORAL EVERY 8 HOURS PRN
Qty: 30 TABLET | Refills: 0 | Status: SHIPPED | OUTPATIENT
Start: 2023-07-26

## 2023-07-26 RX ADMIN — SODIUM CHLORIDE 1000 ML: 0.9 INJECTION, SOLUTION INTRAVENOUS at 00:19

## 2023-07-26 RX ADMIN — KETOROLAC TROMETHAMINE 15 MG: 30 INJECTION, SOLUTION INTRAMUSCULAR at 00:19

## 2023-07-26 RX ADMIN — MORPHINE SULFATE 4 MG: 4 INJECTION INTRAVENOUS at 00:20

## 2023-07-26 RX ADMIN — IOHEXOL 100 ML: 350 INJECTION, SOLUTION INTRAVENOUS at 01:19

## 2023-07-26 NOTE — DISCHARGE INSTRUCTIONS
Please follow-up as scheduled with the gastroenterologist as instructed. Please continue supportive care as instructed. Please return to the ED for any new or worsening of symptoms.

## 2023-07-26 NOTE — ED PROVIDER NOTES
History  Chief Complaint   Patient presents with   • Abdominal Pain     Epigastric abdominal pain for several days. Patient reports difficulty eating and drinking due to nausea and vomiting. Outpatient blood work obtained today from PCP due to same issue. Patient is a 50years old female with a past medical history of chronic abdominal pain, GERD and depression and a surgical history of hysterectomy who presented to the ED today for an ongoing epigastric abdominal pain for the past several days with associated nausea and vomiting. Patient stated that she was seen at [facility] on 07/11 for the same complaint with a negative work-up. Admits symptoms returned 3 days ago. Describes the pain as a burning aching sensation that appears to migrate throughout her abdomen. Denies any diarrhea, fever, chest pain, shortness of breath, or any other complaint on review of systems. Prior to Admission Medications   Prescriptions Last Dose Informant Patient Reported? Taking?    QUEtiapine (SEROquel) 100 mg tablet  Self Yes No   Sig: take 1 AND 1/2 tablets by mouth daily if needed for insomnia   albuterol (2.5 mg/3 mL) 0.083 % nebulizer solution  Self No No   Sig: Take 6 mL (5 mg total) by nebulization every 6 (six) hours as needed for wheezing   Patient not taking: Reported on 7/18/2023   dicyclomine (BENTYL) 20 mg tablet  Self No No   Sig: Take 1 tablet (20 mg total) by mouth 2 (two) times a day   eszopiclone (LUNESTA) 3 MG tablet  Self Yes No   Sig: take 1 tablet by mouth daily immediately BEFORE BEDTIME   famotidine-calcium carbonate-magnesium hydroxide (PEPCID COMPLETE) -165 MG CHEW  Self No No   Sig: Chew 1 tablet 2 (two) times a day for 25 doses   ondansetron (ZOFRAN-ODT) 4 mg disintegrating tablet  Self No No   Sig: Take 1 tablet (4 mg total) by mouth every 6 (six) hours as needed for nausea or vomiting   pantoprazole (PROTONIX) 40 mg tablet  Self No No   Sig: Take 1 tablet (40 mg total) by mouth 2 (two) times a day   senna (SENOKOT) 8.6 MG tablet  Self No No   Sig: Take 1 tablet (8.6 mg total) by mouth daily   venlafaxine (EFFEXOR-XR) 150 mg 24 hr capsule  Self Yes No   Sig: take 1 capsule by mouth at bedtime AFTER COMPLETION OF 75MG   venlafaxine (EFFEXOR-XR) 75 mg 24 hr capsule  Self Yes No   Sig: TAKE 1 CAPSULE ORALLY  DAILY WITH FOOD (WITH 150MG)      Facility-Administered Medications: None       Past Medical History:   Diagnosis Date   • Bladder perforation, intraoperative    • Depression    • Intestine, perforation (HCC)     intra op   • Sepsis ([address])        Past Surgical History:   Procedure Laterality Date   • ABDOMINAL SURGERY     • BLADDER SURGERY     •  SECTION     • COLONOSCOPY     • CT NEEDLE BIOPSY RETROPERITONEUM  2020   • HYSTERECTOMY     • INCISIONAL HERNIA REPAIR N/A 2021    Procedure: OPEN REPAIR VENTRAL/INCISIONAL HERNIA W/MESH;  Surgeon: Colby Hubbard MD;  Location: [facility] OR;  Service: General   • OOPHORECTOMY Bilateral        Family History   Problem Relation Age of Onset   • No Known Problems Mother    • Pancreatic cancer Father    • No Known Problems Sister    • No Known Problems Sister    • No Known Problems Sister    • No Known Problems Daughter    • Cervical cancer Maternal Grandmother    • No Known Problems Paternal Grandmother    • No Known Problems Maternal Aunt    • No Known Problems Maternal Aunt    • No Known Problems Maternal Aunt    • No Known Problems Maternal Aunt    • No Known Problems Paternal Aunt    • Pancreatic cancer Paternal Aunt    • Breast cancer Neg Hx    • Endometrial cancer Neg Hx    • Ovarian cancer Neg Hx      I have reviewed and agree with the history as documented.     E-Cigarette/Vaping   • E-Cigarette Use Never User      E-Cigarette/Vaping Substances   • Nicotine No    • THC No    • CBD No    • Flavoring No    • Other No    • Unknown No      Social History     Tobacco Use   • Smoking status: Every Day     Packs/day: 0.50 Types: Cigarettes   • Smokeless tobacco: Never   Vaping Use   • Vaping Use: Never used   Substance Use Topics   • Alcohol use: Not Currently   • Drug use: Never       Review of Systems   Constitutional: Positive for chills. Negative for fever. HENT: Negative for ear pain and sore throat. Eyes: Negative for pain and visual disturbance. Respiratory: Negative for cough and shortness of breath. Cardiovascular: Negative for chest pain and palpitations. Gastrointestinal: Positive for abdominal pain, nausea and vomiting. Negative for constipation and diarrhea. Genitourinary: Negative for dysuria, frequency, hematuria, urgency, vaginal bleeding and vaginal pain. Musculoskeletal: Negative for arthralgias and back pain. Skin: Negative for color change and rash. Neurological: Negative for dizziness, seizures, syncope and headaches. All other systems reviewed and are negative. Physical Exam  Physical Exam  Vitals and nursing note reviewed. Constitutional:       General: She is not in acute distress. Appearance: She is well-developed. HENT:      Head: Normocephalic and atraumatic. Eyes:      Conjunctiva/sclera: Conjunctivae normal.   Cardiovascular:      Rate and Rhythm: Normal rate and regular rhythm. Heart sounds: No murmur heard. Pulmonary:      Effort: Pulmonary effort is normal. No respiratory distress. Breath sounds: Normal breath sounds. Abdominal:      General: There is no distension. Palpations: Abdomen is soft. There is no hepatomegaly, splenomegaly or pulsatile mass. Tenderness: There is generalized abdominal tenderness. There is guarding. There is no right CVA tenderness, left CVA tenderness or rebound. Negative signs include Valera's sign, McBurney's sign, psoas sign and obturator sign. Hernia: No hernia is present. Musculoskeletal:         General: No swelling. Cervical back: Neck supple. Skin:     General: Skin is warm and dry. Capillary Refill: Capillary refill takes less than 2 seconds. Neurological:      Mental Status: She is alert.    Psychiatric:         Mood and Affect: Mood normal.         Vital Signs  ED Triage Vitals   Temperature Pulse Respirations Blood Pressure SpO2   07/25/23 2301 07/25/23 2301 07/25/23 2301 07/25/23 2301 07/25/23 2301   97.7 °F (36.5 °C) 100 18 116/84 100 %      Temp Source Heart Rate Source Patient Position - Orthostatic VS BP Location FiO2 (%)   07/25/23 2301 07/25/23 2301 07/25/23 2301 07/25/23 2301 --   Tympanic Monitor Sitting Left arm       Pain Score       07/26/23 0019       8           Vitals:    07/25/23 2301   BP: 116/84   Pulse: 100   Patient Position - Orthostatic VS: Sitting         Visual Acuity      ED Medications  Medications   sodium chloride 0.9 % bolus 1,000 mL (0 mL Intravenous Stopped 7/26/23 0240)   ketorolac (TORADOL) injection 15 mg (15 mg Intravenous Given 7/26/23 0019)   morphine injection 4 mg (4 mg Intravenous Given 7/26/23 0020)   iohexol (OMNIPAQUE) 350 MG/ML injection (SINGLE-DOSE) 100 mL (100 mL Intravenous Given 7/26/23 0119)       Diagnostic Studies  Results Reviewed     Procedure Component Value Units Date/Time    [facility] [[id number]]  (Abnormal) Collected: 07/26/23 0012    Lab Status: Final result Specimen: Blood from Arm, Right Updated: 07/26/23 0038     Sodium 138 mmol/L      Potassium 3.7 mmol/L      Chloride 102 mmol/L      CO2 27 mmol/L      ANION GAP 9 mmol/L      BUN 16 mg/dL      Creatinine 0.99 mg/dL      Glucose 110 mg/dL      Calcium 10.6 mg/dL      AST 15 U/L      ALT 12 U/L      Alkaline Phosphatase 89 U/L      Total Protein 7.9 g/dL      Albumin 5.1 g/dL      Total Bilirubin 0.40 mg/dL      eGFR 67 ml/min/1.73sq m     Narrative:      [place] guidelines for Chronic Kidney Disease (CKD):   •  Stage 1 with normal or high GFR (GFR > 90 mL/min/1.73 square meters)  •  Stage 2 Mild CKD (GFR = 60-89 mL/min/1.73 square meters)  •  Stage 3A Moderate CKD (GFR = 45-59 mL/min/1.73 square meters)  •  Stage 3B Moderate CKD (GFR = 30-44 mL/min/1.73 square meters)  •  Stage 4 Severe CKD (GFR = 15-29 mL/min/1.73 square meters)  •  Stage 5 End Stage CKD (GFR <15 mL/min/1.73 square meters)  Note: GFR calculation is accurate only with a steady state creatinine    Lipase [[id number]]  (Normal) Collected: 07/26/23 0012    Lab Status: Final result Specimen: Blood from Arm, Right Updated: 07/26/23 0038     Lipase 34 u/L     Urine Microscopic [[id number]]  (Abnormal) Collected: 07/26/23 0011    Lab Status: Final result Specimen: Urine, Clean Catch Updated: 07/26/23 0022     RBC, UA 1-2 /hpf      WBC, UA 1-2 /hpf      Epithelial Cells Occasional /hpf      Bacteria, UA None Seen /hpf      MUCUS THREADS Occasional    UA w Reflex to Microscopic w Reflex to Culture [[id number]]  (Abnormal) Collected: 07/26/23 0011    Lab Status: Final result Specimen: Urine, Clean Catch Updated: 07/26/23 0021     Color, UA Light Yellow     Clarity, UA Clear     Specific Gravity, UA 1.008     pH, UA 5.5     Leukocytes, UA Negative     Nitrite, UA Negative     Protein, UA Negative mg/dl      Glucose, UA Negative mg/dl      Ketones, UA Negative mg/dl      Urobilinogen, UA <2.0 mg/dl      Bilirubin, UA Negative     Occult Blood, UA Trace    CBC and differential [[id number]]  (Abnormal) Collected: 07/26/23 0012    Lab Status: Final result Specimen: Blood from Arm, Right Updated: 07/26/23 0019     WBC 10.90 Thousand/uL      RBC 5.16 Million/uL      Hemoglobin 15.2 g/dL      Hematocrit 46.1 %      MCV 89 fL      MCH 29.5 pg      MCHC 33.0 g/dL      RDW 14.3 %      MPV 10.3 fL      Platelets 795 Thousands/uL      nRBC 0 /100 WBCs      Neutrophils Relative 59 %      Immat GRANS % 1 %      Lymphocytes Relative 27 %      Monocytes Relative 6 %      Eosinophils Relative 7 %      Basophils Relative 0 %      Neutrophils Absolute 6.48 Thousands/µL      Immature Grans Absolute 0.05 Thousand/uL Lymphocytes Absolute 2.92 Thousands/µL      Monocytes Absolute 0.66 Thousand/µL      Eosinophils Absolute 0.76 Thousand/µL      Basophils Absolute 0.03 Thousands/µL                  CT Abdomen pelvis with contrast   Final Result by Jaxon Bateman MD (07/26 0201)      No evidence of acute intra-abdominal or pelvic process. Workstation performed: [id number]                    Procedures  Procedures         ED Course  ED Course as of 07/26/23 0650   Wed Jul 26, 2023 0209 CT Abdomen pelvis with contrast  IMPRESSION:     No evidence of acute intra-abdominal or pelvic process.           Workstation performed: [id number]         Medical Decision Making  Patient's history and exam finding concerning for GERD versus gastroenteritis versus psychogenic. Patient was given 15 mg of Toradol and 4 mg of morphine pending diagnostic work-up. Patient reevaluated and admits improvement of her pain. Patient's recent visit at [address] [place] [address] was reviewed and was notable for no significant laboratory work-up. Diagnostic work-up including CT scan of the abdomen and pelvis came back notable for no acute findings. After counseling patient extensively, patient was discharged home with instructions to follow-up with primary care physician, gastroenterologist and to return to the ED for any new or worsening of symptoms. Patient stable upon discharge. Amount and/or Complexity of Data Reviewed  Labs: ordered. Radiology: ordered. Decision-making details documented in ED Course. Risk  OTC drugs. Prescription drug management.           Disposition  Final diagnoses:   Diffuse abdominal pain   Nausea and vomiting, unspecified vomiting type     Time reflects when diagnosis was documented in both MDM as applicable and the Disposition within this note     Time User Action Codes Description Comment    7/26/2023  2:21 AM Pamella, Obioma Add [R10.84] Diffuse abdominal pain     7/26/2023  2:21 AM Pamella [facility] [R11.2] Nausea and vomiting, unspecified vomiting type       ED Disposition     ED Disposition   Discharge    Condition   Stable    Date/Time   Wed Jul 26, 2023  2:20 AM    Mary Fay discharge to home/self care.                Follow-up Information     Follow up With Specialties Details Why Contact Info Additional Information    Ezio Jiménez DO Family Medicine Call in 1 day  [address]        [facility] Call in 1 day  [address] [facility] [address] [phone] [facility], [address] [facility], [address] [facility], [place], [place], [address]          Discharge Medication List as of 7/26/2023  2:24 AM      START taking these medications    Details   acetaminophen (TYLENOL) 500 mg tablet Take 2 tablets (1,000 mg total) by mouth every 8 (eight) hours as needed for mild pain, moderate pain or headaches, Starting Wed 7/26/2023, Normal      ondansetron (ZOFRAN) 4 mg tablet Take 1 tablet (4 mg total) by mouth every 8 (eight) hours as needed for nausea or vomiting, Starting Wed 7/26/2023, Normal         CONTINUE these medications which have NOT CHANGED    Details   albuterol (2.5 mg/3 mL) 0.083 % nebulizer solution Take 6 mL (5 mg total) by nebulization every 6 (six) hours as needed for wheezing, Starting Mon 3/28/2022, Normal      dicyclomine (BENTYL) 20 mg tablet Take 1 tablet (20 mg total) by mouth 2 (two) times a day, Starting Wed 12/21/2022, Normal      eszopiclone (LUNESTA) 3 MG tablet take 1 tablet by mouth daily immediately BEFORE BEDTIME, Historical Med      famotidine-calcium carbonate-magnesium hydroxide (PEPCID COMPLETE) -165 MG CHEW Chew 1 tablet 2 (two) times a day for 25 doses, Starting Tue 1/17/2023, Until Wed 7/19/2023, Normal      ondansetron (ZOFRAN-ODT) 4 mg disintegrating tablet Take 1 tablet (4 mg total) by mouth every 6 (six) hours as needed for nausea or vomiting, Starting Wed 6/14/2023, Print      pantoprazole (PROTONIX) 40 mg tablet Take 1 tablet (40 mg total) by mouth 2 (two) times a day, Starting Thu 1/26/2023, Normal      QUEtiapine (SEROquel) 100 mg tablet take 1 AND 1/2 tablets by mouth daily if needed for insomnia, Historical Med      senna (SENOKOT) 8.6 MG tablet Take 1 tablet (8.6 mg total) by mouth daily, Starting Tue 7/18/2023, Normal      !! venlafaxine (EFFEXOR-XR) 150 mg 24 hr capsule take 1 capsule by mouth at bedtime AFTER COMPLETION OF 75MG, Historical Med      !! venlafaxine (EFFEXOR-XR) 75 mg 24 hr capsule TAKE 1 CAPSULE ORALLY  DAILY WITH FOOD (WITH 150MG), Historical Med       !! - Potential duplicate medications found. Please discuss with provider.               PDMP Review     None          ED Provider  Electronically Signed by           Taylor Lucia PA-C  07/26/23 5627 Shane Velarde PA-C  07/26/23 4079

## 2023-08-07 ENCOUNTER — OFFICE VISIT (OUTPATIENT)
Dept: GASTROENTEROLOGY | Facility: CLINIC | Age: 48
End: 2023-08-07
Payer: COMMERCIAL

## 2023-08-07 VITALS
DIASTOLIC BLOOD PRESSURE: 84 MMHG | HEIGHT: 61 IN | BODY MASS INDEX: 35.72 KG/M2 | SYSTOLIC BLOOD PRESSURE: 122 MMHG | WEIGHT: 189.2 LBS | HEART RATE: 80 BPM

## 2023-08-07 DIAGNOSIS — R10.84 DIFFUSE ABDOMINAL PAIN: ICD-10-CM

## 2023-08-07 DIAGNOSIS — R68.81 EARLY SATIETY: ICD-10-CM

## 2023-08-07 DIAGNOSIS — R19.7 DIARRHEA, UNSPECIFIED TYPE: Primary | ICD-10-CM

## 2023-08-07 DIAGNOSIS — R63.4 WEIGHT LOSS: ICD-10-CM

## 2023-08-07 DIAGNOSIS — R11.2 NAUSEA AND VOMITING, UNSPECIFIED VOMITING TYPE: ICD-10-CM

## 2023-08-07 PROCEDURE — 99214 OFFICE O/P EST MOD 30 MIN: CPT | Performed by: INTERNAL MEDICINE

## 2023-08-07 RX ORDER — OMEPRAZOLE 40 MG/1
40 CAPSULE, DELAYED RELEASE ORAL 2 TIMES DAILY
Qty: 180 CAPSULE | Refills: 0 | Status: SHIPPED | OUTPATIENT
Start: 2023-08-07

## 2023-08-07 NOTE — PROGRESS NOTES
[facility] - Outpatient Note  Glenn Lockhart 50 y.o. female MRN: [id number]  Encounter: [id number]      ASSESSMENT AND PLAN:    Glenn Lockhart is a 50 y.o. old pleasant female with PMH of chronic abdominal pain, GERD, depression, history of hysterectomy, IBS mixed who presents for followup for abdominal pain    Abdominal pain, nausea vomiting, early satiety, weight loss-patient symptoms have worsened recently causing her difficulty eating and persistent nausea vomiting which is affecting her quality of life. Her symptoms are suspicious for gastroparesis given her early satiety, bloating and abdominal distention. She has had EGD in the past however no biopsies for H. pylori were taken or celiac disease. She also had colonoscopy in the past however no biopsies for microscopic colitis. Given her weight loss and significant symptoms I think endoscopic evaluation would be prudent. · Plan for EGD/colonoscopy with biopsies for H. pylori, celiac disease and microscopic colitis. · Check gastric emptying study  · Switch to omeprazole 40 mg twice daily  · Continue Zofran as needed  · Consider SIBO testing in the future versus empiric trial of antibiotics. She has failed Xifaxan in the past.  · Consider    IBS mixed-currently still more constipation predominant. She is currently on Benefiber and milk of magnesia twice per week and this has improved her bowel habits however she still constipated with alternating diarrhea. I suspect there is a role of constipation with overflow diarrhea  · She is agreeable to trial of a low FODMAP diet however she would like to discuss this after her EGD/colonoscopy  · Continue fiber supplementation  · Counseled on fluid and ambulation        1. Diffuse abdominal pain    - Ambulatory Referral to Gastroenterology    2.  Nausea and vomiting, unspecified vomiting type    - Ambulatory Referral to Gastroenterology    ______________________________________________________________________    SUBJECTIVE:  Melissa Villagran is a 50 y.o. old pleasant female with PMH of chronic abdominal pain, GERD, depression, history of hysterectomy, IBS mixed who presents for followup for abdominal pain. Patient was recently in ED on  with symptoms of ongoing epigastric pain for several days associate with nausea and vomiting. She was seen at [facility] for similar complaints 2 weeks prior. Symptoms returned 3 days prior to her recent ED visit. She reports a burning aching sensation throughout her abdomen. Denies diarrhea fever chest pain shortness of breath. Her vital signs are stable. Her labs including CBC, lipase and LFTs were unremarkable. Currently she reports that her symptoms have not improved. She has episodes of nausea vomiting abdominal discomfort elastase without relief. She also reports alternating constipation and diarrhea with no clear triggers. She does not have rectal bleeding. Her previous dysphagia has resolved. EGD in  was unremarkable with biopsies negative for EOE. She was empirically dilated 54 [place] dilator. Colonoscopy  showed 1 subcentimeter SSA. I reviewed prior external notes    I reviewed previous lab results and images      REVIEW OF SYSTEMS:     REVIEW OF ALL OTHER SYSTEMS IS OTHERWISE NEGATIVE.       Historical Information   Past Medical History:   Diagnosis Date   • Bladder perforation, intraoperative    • Depression    • Intestine, perforation ([address])     intra op   • Sepsis ([address])      Past Surgical History:   Procedure Laterality Date   • ABDOMINAL SURGERY     • BLADDER SURGERY     •  SECTION     • COLONOSCOPY     • CT NEEDLE BIOPSY RETROPERITONEUM  2020   • HYSTERECTOMY     • INCISIONAL HERNIA REPAIR N/A 2021    Procedure: OPEN REPAIR VENTRAL/INCISIONAL HERNIA W/MESH;  Surgeon: Cinthya Constantino MD;  Location: [facility];  Service: General   • OOPHORECTOMY Bilateral 2015     Social History   Social History     Substance and Sexual Activity   Alcohol Use Not Currently     Social History     Substance and Sexual Activity   Drug Use Never     Social History     Tobacco Use   Smoking Status Every Day   • Packs/day: 0.50   • Types: Cigarettes   Smokeless Tobacco Never     Family History   Problem Relation Age of Onset   • No Known Problems Mother    • Pancreatic cancer Father    • No Known Problems Sister    • No Known Problems Sister    • No Known Problems Sister    • No Known Problems Daughter    • Cervical cancer Maternal Grandmother    • No Known Problems Paternal Grandmother    • No Known Problems Maternal Aunt    • No Known Problems Maternal Aunt    • No Known Problems Maternal Aunt    • No Known Problems Maternal Aunt    • No Known Problems Paternal Aunt    • Pancreatic cancer Paternal Aunt    • Breast cancer Neg Hx    • Endometrial cancer Neg Hx    • Ovarian cancer Neg Hx        Meds/Allergies       Current Outpatient Medications:   •  albuterol (2.5 mg/3 mL) 0.083 % nebulizer solution  •  dicyclomine (BENTYL) 20 mg tablet  •  eszopiclone (LUNESTA) 3 MG tablet  •  ondansetron (ZOFRAN) 4 mg tablet  •  ondansetron (ZOFRAN-ODT) 4 mg disintegrating tablet  •  pantoprazole (PROTONIX) 40 mg tablet  •  QUEtiapine (SEROquel) 100 mg tablet  •  RA SENNA-LAX PO  •  senna (SENOKOT) 8.6 MG tablet  •  venlafaxine (EFFEXOR-XR) 150 mg 24 hr capsule  •  venlafaxine (EFFEXOR-XR) 75 mg 24 hr capsule  •  acetaminophen (TYLENOL) 500 mg tablet  •  famotidine-calcium carbonate-magnesium hydroxide (PEPCID COMPLETE) -165 MG CHEW    No Known Allergies        Objective     Blood pressure 122/84, pulse 80, height 5' 1" (1.549 m), weight 85.8 kg (189 lb 3.2 oz), not currently breastfeeding. Body mass index is 35.75 kg/m². PHYSICAL EXAM:      General Appearance:   Alert, cooperative, no distress   HEENT:   Normocephalic, atraumatic, anicteric.      Neck: Supple, symmetrical, trachea midline   Lungs:   Clear to auscultation bilaterally; no rales, rhonchi or wheezing; respirations unlabored    Heart[de-identified]   Regular rate and rhythm; no murmur, rub, or gallop. Abdomen:   Soft, non-tender, non-distended; normal bowel sounds; no masses, no organomegaly    Genitalia:   Deferred    Rectal:   Deferred    Extremities:  No cyanosis, clubbing or edema    Pulses:  2+ and symmetric    Skin:  No jaundice, rashes, or lesions    Lymph nodes:  No palpable cervical lymphadenopathy        Lab Results:   No visits with results within 1 Day(s) from this visit.    Latest known visit with results is:   Admission on 07/25/2023, Discharged on 07/26/2023   Component Date Value   • Ventricular Rate 07/25/2023 106    • Atrial Rate 07/25/2023 106    • NM Interval 07/25/2023 142    • QRSD Interval 07/25/2023 72    • QT Interval 07/25/2023 348    • QTC Interval 07/25/2023 462    • P Axis 07/25/2023 76    • QRS Axis 07/25/2023 56    • T Wave Axis 07/25/2023 26    • Ventricular Rate 07/25/2023 89    • Atrial Rate 07/25/2023 89    • NM Interval 07/25/2023 140    • QRSD Interval 07/25/2023 74    • QT Interval 07/25/2023 366    • QTC Interval 07/25/2023 445    • P Axis 07/25/2023 78    • QRS Axis 07/25/2023 54    • T Wave Axis 07/25/2023 26    • WBC 07/26/2023 10.90 (H)    • RBC 07/26/2023 5.16 (H)    • Hemoglobin 07/26/2023 15.2    • Hematocrit 07/26/2023 46.1    • MCV 07/26/2023 89    • MCH 07/26/2023 29.5    • MCHC 07/26/2023 33.0    • RDW 07/26/2023 14.3    • MPV 07/26/2023 10.3    • Platelets 94/49/2749 305    • nRBC 07/26/2023 0    • Neutrophils Relative 07/26/2023 59    • Immat GRANS % 07/26/2023 1    • Lymphocytes Relative 07/26/2023 27    • Monocytes Relative 07/26/2023 6    • Eosinophils Relative 07/26/2023 7 (H)    • Basophils Relative 07/26/2023 0    • Neutrophils Absolute 07/26/2023 6.48    • Immature Grans Absolute 07/26/2023 0.05    • Lymphocytes Absolute 07/26/2023 2.92    • Monocytes Absolute 07/26/2023 0.66    • Eosinophils Absolute 07/26/2023 0.76 (H)    • Basophils Absolute 07/26/2023 0.03    • Sodium 07/26/2023 138    • Potassium 07/26/2023 3.7    • Chloride 07/26/2023 102    • CO2 07/26/2023 27    • ANION GAP 07/26/2023 9    • BUN 07/26/2023 16    • Creatinine 07/26/2023 0.99    • Glucose 07/26/2023 110    • Calcium 07/26/2023 10.6 (H)    • AST 07/26/2023 15    • ALT 07/26/2023 12    • Alkaline Phosphatase 07/26/2023 89    • Total Protein 07/26/2023 7.9    • Albumin 07/26/2023 5.1 (H)    • Total Bilirubin 07/26/2023 0.40    • eGFR 07/26/2023 67    • Lipase 07/26/2023 34    • Color, UA 07/26/2023 Light Yellow    • Clarity, UA 07/26/2023 Clear    • Specific Gravity, UA 07/26/2023 1.008    • pH, UA 07/26/2023 5.5    • Leukocytes, UA 07/26/2023 Negative    • Nitrite, UA 07/26/2023 Negative    • Protein, UA 07/26/2023 Negative    • Glucose, UA 07/26/2023 Negative    • Ketones, UA 07/26/2023 Negative    • Urobilinogen, UA 07/26/2023 <2.0    • Bilirubin, UA 07/26/2023 Negative    • Occult Blood, UA 07/26/2023 Trace (A)    • RBC, UA 07/26/2023 1-2    • WBC, UA 07/26/2023 1-2    • Epithelial Cells 07/26/2023 Occasional    • Bacteria, UA 07/26/2023 None Seen    • MUCUS THREADS 07/26/2023 Occasional (A)          Radiology Results:   CT Abdomen pelvis with contrast    Result Date: 7/26/2023  Narrative: CT ABDOMEN AND PELVIS WITH IV CONTRAST INDICATION:   Abdominal pain. COMPARISON: 6/14/2023 TECHNIQUE:  CT examination of the abdomen and pelvis was performed. Multiplanar 2D reformatted images were created from the source data. This examination, like all CT scans performed in the [facility], was performed utilizing techniques to minimize radiation dose exposure, including the use of iterative reconstruction and automated exposure control.  Radiation dose length product (DLP) for this visit:  883 mGy-cm IV Contrast:  100 mL of iohexol (OMNIPAQUE) Enteric Contrast:  Enteric contrast was not administered. FINDINGS: ABDOMEN LOWER CHEST: Clear lung bases. LIVER/BILIARY TREE:  Unremarkable. GALLBLADDER: Clear lung bases. SPLEEN:  Unremarkable. PANCREAS: Stable subcentimeter cystic lesion in the body of the pancreas. ADRENAL GLANDS:  Unremarkable. KIDNEYS/URETERS: Symmetric nephrographic phase enhancement of the kidneys. No obstructive uropathy. STOMACH AND BOWEL: Focal mild ileus in the small bowel surrounding sutures. No evidence of bowel obstruction, colitis or diverticulitis. Stable left spigelian fat-containing hernia. . APPENDIX: Normal appendix. ABDOMINOPELVIC CAVITY:  No ascites. No pneumoperitoneum. No lymphadenopathy. VESSELS: No abdominal aortic aneurysm. PELVIS REPRODUCTIVE ORGANS: Prior hysterectomy. URINARY BLADDER:  Unremarkable. ABDOMINAL WALL/INGUINAL REGIONS:  Unremarkable. OSSEOUS STRUCTURES:  No acute fracture or destructive osseous lesion. Impression: No evidence of acute intra-abdominal or pelvic process. Workstation performed: [id number]     CTA abdomen pelvis w wo contrast    Result Date: 7/11/2023  Narrative: History: Abdominal pain   Exam: CT of the abdomen and pelvis with IV contrast.   Technique: Using helical technique, axial images were obtained through the abdomen and pelvis following administration of 100 cc of Omnipaque 350 intravenous contrast. Coronal and sagittal reformations were performed.   Comparison: 12/30/2019.     Abdomen: Lung Bases: Visualized lung bases are clear. Liver: Normal. Gallbladder/Bile ducts: Normal. Spleen: Normal. Pancreas: No acute abnormalities. 9 mm hypodensity noted in the proximal pancreatic body. Adrenal glands: Normal. Kidneys/Ureters: Unremarkable. Bowel/Mesentery: No acute bowel abnormalities. No bowel obstruction. No evidence for acute appendicitis. No free fluid or air in the abdomen.  Lymph nodes: No pathologically enlarged nodes Vessels: No acute abnormalities Abdominal Wall: Fat-containing hernia of the left lateral lower abdominal/pelvic wall, image 173 for example.   Pelvis: No free fluid or air in the pelvis. No pelvic lymphadenopathy. Unremarkable urinary bladder.   Bones: No acute bony abnormalities.      Impression: Impression: 1. Anterior left lower abdominal/pelvic wall hernia containing fat. 2. 9 mm hypodensity in the proximal pancreatic body which may be further characterized with MRI.  QTPEZIYOOT:[id number]

## 2023-08-07 NOTE — PATIENT INSTRUCTIONS
Scheduled date of EGD/colonoscopy (as of today):8/29/23  Physician performing EGD/colonoscopy:David  Location of EGD/colonoscopy:[facility]  Desired bowel prep reviewed with patient:miralax/dulcolax  Instructions reviewed with patient by:Yamilex LING  Clearances:   none

## 2023-08-15 ENCOUNTER — TELEPHONE (OUTPATIENT)
Age: 48
End: 2023-08-15

## 2023-08-15 NOTE — TELEPHONE ENCOUNTER
Received request for medical records from [facility] # [id number] to be sent to [facility] Office file# [id number]    Faxed on 8/15/2023 to [facility] phone [phone]

## 2023-08-21 ENCOUNTER — TELEPHONE (OUTPATIENT)
Dept: GASTROENTEROLOGY | Facility: CLINIC | Age: 48
End: 2023-08-21

## 2023-09-07 ENCOUNTER — TELEPHONE (OUTPATIENT)
Age: 48
End: 2023-09-07

## 2023-09-07 NOTE — TELEPHONE ENCOUNTER
Received request for medical records from [address] [place] [address] Omar on 9/7/2023 O phone [phone]

## 2023-09-18 ENCOUNTER — TELEPHONE (OUTPATIENT)
Age: 48
End: 2023-09-18

## 2023-09-18 ENCOUNTER — TELEPHONE (OUTPATIENT)
Dept: GASTROENTEROLOGY | Facility: CLINIC | Age: 48
End: 2023-09-18

## 2023-09-18 NOTE — TELEPHONE ENCOUNTER
Received form from [facility] group requesting patient records. Will scan to [facility]O.

## 2023-09-18 NOTE — TELEPHONE ENCOUNTER
Scheduled date of colonoscopy (as of today): 10-   Physician performing colonoscopy: Dr. Roslyn Silverman   Location of colonoscopy: [facility]   Bowel prep reviewed with patient: sent miralax/dul via [employer]   Instructions reviewed with patient by: via [employer] K[employer]Kt   Clearances: n/a

## 2023-09-25 ENCOUNTER — TELEPHONE (OUTPATIENT)
Dept: GASTROENTEROLOGY | Facility: CLINIC | Age: 48
End: 2023-09-25

## 2023-09-25 NOTE — TELEPHONE ENCOUNTER
Received VIA fax paperwork [facility] [address] [facility]  . will fax to [address] [facility] into patients chart

## 2023-10-09 ENCOUNTER — TELEPHONE (OUTPATIENT)
Age: 48
End: 2023-10-09

## 2023-10-09 NOTE — TELEPHONE ENCOUNTER
Called ant Cummings, confirming patient colonoscopy @ EGD [place] 10/11/23. Reminded patient: needs a ride to and from the [facility], DEBORAHKELECHI JENSEN will call between 2-6 on Tuesday. All liquid diet on Tuesday.

## 2023-10-11 ENCOUNTER — HOSPITAL ENCOUNTER (OUTPATIENT)
Dept: GASTROENTEROLOGY | Facility: HOSPITAL | Age: 48
Setting detail: OUTPATIENT SURGERY
Discharge: HOME | End: 2023-10-11
Attending: INTERNAL MEDICINE
Payer: COMMERCIAL

## 2023-10-11 ENCOUNTER — ANESTHESIA (OUTPATIENT)
Dept: GASTROENTEROLOGY | Facility: HOSPITAL | Age: 48
End: 2023-10-11

## 2023-10-11 ENCOUNTER — ANESTHESIA EVENT (OUTPATIENT)
Dept: GASTROENTEROLOGY | Facility: HOSPITAL | Age: 48
End: 2023-10-11

## 2023-10-11 VITALS
RESPIRATION RATE: 18 BRPM | OXYGEN SATURATION: 96 % | DIASTOLIC BLOOD PRESSURE: 61 MMHG | HEART RATE: 76 BPM | TEMPERATURE: 97.3 F | BODY MASS INDEX: 36.75 KG/M2 | WEIGHT: 194.67 LBS | HEIGHT: 61 IN | SYSTOLIC BLOOD PRESSURE: 103 MMHG

## 2023-10-11 DIAGNOSIS — R10.84 DIFFUSE ABDOMINAL PAIN: ICD-10-CM

## 2023-10-11 DIAGNOSIS — R63.4 WEIGHT LOSS: ICD-10-CM

## 2023-10-11 DIAGNOSIS — R11.2 NAUSEA AND VOMITING, UNSPECIFIED VOMITING TYPE: ICD-10-CM

## 2023-10-11 DIAGNOSIS — R19.7 DIARRHEA, UNSPECIFIED TYPE: ICD-10-CM

## 2023-10-11 PROCEDURE — 88305 TISSUE EXAM BY PATHOLOGIST: CPT | Performed by: PATHOLOGY

## 2023-10-11 RX ORDER — PROPOFOL 10 MG/ML
INJECTION, EMULSION INTRAVENOUS AS NEEDED
Status: DISCONTINUED | OUTPATIENT
Start: 2023-10-11 | End: 2023-10-11

## 2023-10-11 RX ORDER — PHENYLEPHRINE HCL IN 0.9% NACL 1 MG/10 ML
SYRINGE (ML) INTRAVENOUS AS NEEDED
Status: DISCONTINUED | OUTPATIENT
Start: 2023-10-11 | End: 2023-10-11

## 2023-10-11 RX ORDER — SODIUM CHLORIDE, SODIUM LACTATE, POTASSIUM CHLORIDE, CALCIUM CHLORIDE 600; 310; 30; 20 MG/100ML; MG/100ML; MG/100ML; MG/100ML
INJECTION, SOLUTION INTRAVENOUS CONTINUOUS PRN
Status: DISCONTINUED | OUTPATIENT
Start: 2023-10-11 | End: 2023-10-11

## 2023-10-11 RX ORDER — LIDOCAINE HYDROCHLORIDE 20 MG/ML
INJECTION, SOLUTION EPIDURAL; INFILTRATION; INTRACAUDAL; PERINEURAL AS NEEDED
Status: DISCONTINUED | OUTPATIENT
Start: 2023-10-11 | End: 2023-10-11

## 2023-10-11 RX ADMIN — LIDOCAINE HYDROCHLORIDE 100 MG: 20 INJECTION, SOLUTION EPIDURAL; INFILTRATION; INTRACAUDAL; PERINEURAL at 09:56

## 2023-10-11 RX ADMIN — PROPOFOL 30 MG: 10 INJECTION, EMULSION INTRAVENOUS at 10:23

## 2023-10-11 RX ADMIN — PROPOFOL 30 MG: 10 INJECTION, EMULSION INTRAVENOUS at 10:11

## 2023-10-11 RX ADMIN — Medication 100 MCG: at 10:20

## 2023-10-11 RX ADMIN — PROPOFOL 50 MG: 10 INJECTION, EMULSION INTRAVENOUS at 09:58

## 2023-10-11 RX ADMIN — SODIUM CHLORIDE, SODIUM LACTATE, POTASSIUM CHLORIDE, AND CALCIUM CHLORIDE: .6; .31; .03; .02 INJECTION, SOLUTION INTRAVENOUS at 09:56

## 2023-10-11 RX ADMIN — PROPOFOL 50 MG: 10 INJECTION, EMULSION INTRAVENOUS at 10:08

## 2023-10-11 RX ADMIN — PROPOFOL 100 MG: 10 INJECTION, EMULSION INTRAVENOUS at 09:56

## 2023-10-11 RX ADMIN — PROPOFOL 30 MG: 10 INJECTION, EMULSION INTRAVENOUS at 10:17

## 2023-10-11 RX ADMIN — PROPOFOL 50 MG: 10 INJECTION, EMULSION INTRAVENOUS at 10:00

## 2023-10-11 RX ADMIN — PROPOFOL 50 MG: 10 INJECTION, EMULSION INTRAVENOUS at 10:04

## 2023-10-11 RX ADMIN — PROPOFOL 30 MG: 10 INJECTION, EMULSION INTRAVENOUS at 10:20

## 2023-10-11 RX ADMIN — PROPOFOL 30 MG: 10 INJECTION, EMULSION INTRAVENOUS at 10:14

## 2023-10-11 NOTE — ANESTHESIA POSTPROCEDURE EVALUATION
Post-Op Assessment Note    CV Status:  Stable  Pain Score: 0    Pain management: adequate     Mental Status:  Alert and awake   Hydration Status:  Euvolemic   PONV Controlled:  Controlled   Airway Patency:  Patent      Post Op Vitals Reviewed: Yes            No notable events documented.     BP 99/60 (10/11/23 1028)    Temp (!) 97.3 °F (36.3 °C) (10/11/23 1028)    Pulse 79 (10/11/23 1028)   Resp 16 (10/11/23 1028)    SpO2 94 % (10/11/23 1028)

## 2023-10-11 NOTE — H&P
History and Physical -  [facility]  Oc Jimenez 50 y.o. female MRN: [id number]                  HPI: Oc Jimenez is a 50y.o. year old female who presents for colonoscopy for abdominal pain diarrhea and weight loss. EGD cancelled due to URI symptoms. REVIEW OF SYSTEMS: Per the HPI, and otherwise unremarkable.     Historical Information   Past Medical History:   Diagnosis Date    Bladder perforation, intraoperative     Depression     Intestine, perforation (HCC)     intra op    Sepsis ([address])      Past Surgical History:   Procedure Laterality Date    ABDOMINAL SURGERY      BLADDER SURGERY       SECTION      COLONOSCOPY      CT NEEDLE BIOPSY RETROPERITONEUM  2020    HYSTERECTOMY  2015    INCISIONAL HERNIA REPAIR N/A 2021    Procedure: OPEN REPAIR VENTRAL/INCISIONAL HERNIA W/MESH;  Surgeon: Thomasine Gaucher, MD;  Location: [facility] OR;  Service: General    OOPHORECTOMY Bilateral      Social History   Social History     Substance and Sexual Activity   Alcohol Use Not Currently     Social History     Substance and Sexual Activity   Drug Use Never     Social History     Tobacco Use   Smoking Status Every Day    Packs/day: 0.50    Types: Cigarettes   Smokeless Tobacco Never     Family History   Problem Relation Age of Onset    No Known Problems Mother     Pancreatic cancer Father     No Known Problems Sister     No Known Problems Sister     No Known Problems Sister     No Known Problems Daughter     Cervical cancer Maternal Grandmother     No Known Problems Paternal Grandmother     No Known Problems Maternal Aunt     No Known Problems Maternal Aunt     No Known Problems Maternal Aunt     No Known Problems Maternal Aunt     No Known Problems Paternal Aunt     Pancreatic cancer Paternal Aunt     Breast cancer Neg Hx     Endometrial cancer Neg Hx     Ovarian cancer Neg Hx        Meds/Allergies       Current Outpatient Medications:     dicyclomine (BENTYL) 20 mg tablet eszopiclone (LUNESTA) 3 MG tablet    omeprazole (PriLOSEC) 40 MG capsule    QUEtiapine (SEROquel) 100 mg tablet    acetaminophen (TYLENOL) 500 mg tablet    albuterol (2.5 mg/3 mL) 0.083 % nebulizer solution    famotidine-calcium carbonate-magnesium hydroxide (PEPCID COMPLETE) -165 MG CHEW    ondansetron (ZOFRAN) 4 mg tablet    ondansetron (ZOFRAN-ODT) 4 mg disintegrating tablet    RA SENNA-LAX PO    venlafaxine (EFFEXOR-XR) 150 mg 24 hr capsule    venlafaxine (EFFEXOR-XR) 75 mg 24 hr capsule    No Known Allergies    Objective     /68   Pulse 94   Temp 97.6 °F (36.4 °C) (Temporal)   Resp 16   Ht 5' 1" (1.549 m)   Wt 88.3 kg (194 lb 10.7 oz)   SpO2 94%   BMI 36.78 kg/m²       PHYSICAL EXAM    Gen: NAD  Head: NCAT  CV: RRR  CHEST: Clear  ABD: soft, NT/ND  EXT: no edema      ASSESSMENT/PLAN:  : Doris Camacho is a 50y.o. year old female who presents for EGD/colonoscopy for abdominal pain diarrhea and weight loss. The patient is stable and optimized for the procedure, we reviewed risk and benefits. Risk include but not limited to infection, bleeding, perforation and missing a lesion.

## 2023-10-11 NOTE — ANESTHESIA PREPROCEDURE EVALUATION
Procedure:  EGD  COLONOSCOPY    Relevant Problems   ANESTHESIA (within normal limits)   (-) History of anesthesia complications      CARDIO   (-) LEO (dyspnea on exertion)      MUSCULOSKELETAL   (+) Fibromyalgia      NEURO/PSYCH   (+) Fibromyalgia   (+) Generalized anxiety disorder   (+) MDD (major depressive disorder), recurrent, severe, with psychosis ([address])      PULMONARY  Cold x1wk, still coughing, nasal and sinus congestion, no fever/sob   (-) Shortness of breath             Anesthesia Plan  ASA Score- 2     Anesthesia Type- IV sedation with anesthesia with ASA Monitors. Additional Monitors:     Airway Plan:     Comment: Given resolving URI, will proceed with colonoscopy only. Plan Factors-Exercise tolerance (METS): >4 METS. Chart reviewed. EKG reviewed. Existing labs reviewed. Patient summary reviewed. Induction- intravenous. Postoperative Plan-     Informed Consent- Anesthetic plan and risks discussed with patient. I personally reviewed this patient with the CRNA. Discussed and agreed on the Anesthesia Plan with the CRNA. Isaias Andrade

## 2023-10-15 PROCEDURE — 88305 TISSUE EXAM BY PATHOLOGIST: CPT | Performed by: PATHOLOGY

## 2023-10-19 ENCOUNTER — TELEPHONE (OUTPATIENT)
Age: 48
End: 2023-10-19

## 2023-10-19 NOTE — TELEPHONE ENCOUNTER
Zachariah for ptn to call back and reschedule EGD in 1 month for abdominal pain weight loss and diarrhea with Dr. Benedict Perez

## 2023-10-19 NOTE — TELEPHONE ENCOUNTER
----- Message from Kartik Toribio MD sent at 10/16/2023  7:05 AM EDT -----  Repeat colon in 3 years for polyps. Her EGD was canceled by anesthesia for resolving URI. Can we please reschedule EGD in 1 month for abdominal pain weight loss and diarrhea. Thank you.

## 2023-11-01 DIAGNOSIS — R10.84 DIFFUSE ABDOMINAL PAIN: ICD-10-CM

## 2023-11-01 RX ORDER — OMEPRAZOLE 40 MG/1
40 CAPSULE, DELAYED RELEASE ORAL 2 TIMES DAILY
Qty: 180 CAPSULE | Refills: 0 | Status: SHIPPED | OUTPATIENT
Start: 2023-11-01

## 2023-11-09 ENCOUNTER — TELEPHONE (OUTPATIENT)
Age: 48
End: 2023-11-09

## 2023-11-09 NOTE — TELEPHONE ENCOUNTER
SENT   RECORD   RELEASE    DOWN  TO  [facility]   FROM  [facility]  [facility]

## 2023-11-13 ENCOUNTER — TELEPHONE (OUTPATIENT)
Dept: GASTROENTEROLOGY | Facility: CLINIC | Age: 48
End: 2023-11-13

## 2023-11-13 NOTE — TELEPHONE ENCOUNTER
Received paperwork form [employer] via fax requesting medical records. Faxed MRO/ scan to pt chart. 1.56

## 2023-12-26 ENCOUNTER — TELEPHONE (OUTPATIENT)
Age: 48
End: 2023-12-26

## 2023-12-26 NOTE — TELEPHONE ENCOUNTER
Fax to [facility] request for Records from:  [employer] [facility].  [address]  [place], [place] [address]   T: [phone]   F: [phone]  [email or web address]    Memory OK - 12/26/23    12:28 pm   7 pages

## 2024-01-11 ENCOUNTER — TELEPHONE (OUTPATIENT)
Age: 49
End: 2024-01-11

## 2024-02-02 DIAGNOSIS — R10.84 DIFFUSE ABDOMINAL PAIN: ICD-10-CM

## 2024-02-02 RX ORDER — OMEPRAZOLE 40 MG/1
40 CAPSULE, DELAYED RELEASE ORAL 2 TIMES DAILY
Qty: 180 CAPSULE | Refills: 1 | Status: SHIPPED | OUTPATIENT
Start: 2024-02-02

## 2024-02-12 ENCOUNTER — APPOINTMENT (OUTPATIENT)
Age: 49
End: 2024-02-12
Payer: COMMERCIAL

## 2024-02-12 ENCOUNTER — TELEPHONE (OUTPATIENT)
Age: 49
End: 2024-02-12

## 2024-02-12 ENCOUNTER — OFFICE VISIT (OUTPATIENT)
Age: 49
End: 2024-02-12
Payer: COMMERCIAL

## 2024-02-12 VITALS
RESPIRATION RATE: 18 BRPM | TEMPERATURE: 97.6 F | HEART RATE: 94 BPM | SYSTOLIC BLOOD PRESSURE: 118 MMHG | BODY MASS INDEX: 34.77 KG/M2 | OXYGEN SATURATION: 98 % | DIASTOLIC BLOOD PRESSURE: 72 MMHG | WEIGHT: 184 LBS

## 2024-02-12 DIAGNOSIS — R20.2 PARESTHESIA AND PAIN OF EXTREMITY: ICD-10-CM

## 2024-02-12 DIAGNOSIS — M79.601 PARESTHESIA AND PAIN OF BOTH UPPER EXTREMITIES: ICD-10-CM

## 2024-02-12 DIAGNOSIS — R26.9 ABNORMAL GAIT DUE TO MUSCLE WEAKNESS: Primary | ICD-10-CM

## 2024-02-12 DIAGNOSIS — M79.602 PARESTHESIA AND PAIN OF BOTH UPPER EXTREMITIES: ICD-10-CM

## 2024-02-12 DIAGNOSIS — R20.2 PARESTHESIA AND PAIN OF BOTH UPPER EXTREMITIES: ICD-10-CM

## 2024-02-12 DIAGNOSIS — M62.81 ABNORMAL GAIT DUE TO MUSCLE WEAKNESS: Primary | ICD-10-CM

## 2024-02-12 DIAGNOSIS — M79.609 PARESTHESIA AND PAIN OF EXTREMITY: ICD-10-CM

## 2024-02-12 LAB
ALBUMIN SERPL BCP-MCNC: 4.4 G/DL (ref 3.5–5)
ALP SERPL-CCNC: 91 U/L (ref 34–104)
ALT SERPL W P-5'-P-CCNC: 9 U/L (ref 7–52)
ANA SER QL IA: NEGATIVE
ANION GAP SERPL CALCULATED.3IONS-SCNC: 7 MMOL/L
AST SERPL W P-5'-P-CCNC: 13 U/L (ref 13–39)
BASOPHILS # BLD AUTO: 0.08 THOUSANDS/ÂΜL (ref 0–0.1)
BASOPHILS NFR BLD AUTO: 1 % (ref 0–1)
BILIRUB SERPL-MCNC: 0.28 MG/DL (ref 0.2–1)
BUN SERPL-MCNC: 12 MG/DL (ref 5–25)
CALCIUM SERPL-MCNC: 9.4 MG/DL (ref 8.4–10.2)
CHLORIDE SERPL-SCNC: 103 MMOL/L (ref 96–108)
CK SERPL-CCNC: 102 U/L (ref 26–192)
CO2 SERPL-SCNC: 28 MMOL/L (ref 21–32)
CREAT SERPL-MCNC: 0.74 MG/DL (ref 0.6–1.3)
CRP SERPL QL: 8.3 MG/L
EOSINOPHIL # BLD AUTO: 0.1 THOUSAND/ÂΜL (ref 0–0.61)
EOSINOPHIL NFR BLD AUTO: 1 % (ref 0–6)
ERYTHROCYTE [DISTWIDTH] IN BLOOD BY AUTOMATED COUNT: 15 % (ref 11.6–15.1)
ERYTHROCYTE [SEDIMENTATION RATE] IN BLOOD: 15 MM/HOUR (ref 0–29)
GFR SERPL CREATININE-BSD FRML MDRD: 94 ML/MIN/1.73SQ M
GLUCOSE SERPL-MCNC: 87 MG/DL (ref 65–140)
HCT VFR BLD AUTO: 44.9 % (ref 34.8–46.1)
HGB BLD-MCNC: 14.4 G/DL (ref 11.5–15.4)
IMM GRANULOCYTES # BLD AUTO: 0.02 THOUSAND/UL (ref 0–0.2)
IMM GRANULOCYTES NFR BLD AUTO: 0 % (ref 0–2)
LYMPHOCYTES # BLD AUTO: 2.7 THOUSANDS/ÂΜL (ref 0.6–4.47)
LYMPHOCYTES NFR BLD AUTO: 32 % (ref 14–44)
MCH RBC QN AUTO: 28.2 PG (ref 26.8–34.3)
MCHC RBC AUTO-ENTMCNC: 32.1 G/DL (ref 31.4–37.4)
MCV RBC AUTO: 88 FL (ref 82–98)
MONOCYTES # BLD AUTO: 0.59 THOUSAND/ÂΜL (ref 0.17–1.22)
MONOCYTES NFR BLD AUTO: 7 % (ref 4–12)
NEUTROPHILS # BLD AUTO: 4.84 THOUSANDS/ÂΜL (ref 1.85–7.62)
NEUTS SEG NFR BLD AUTO: 59 % (ref 43–75)
NRBC BLD AUTO-RTO: 0 /100 WBCS
PLATELET # BLD AUTO: 347 THOUSANDS/UL (ref 149–390)
PMV BLD AUTO: 10.6 FL (ref 8.9–12.7)
POTASSIUM SERPL-SCNC: 4.5 MMOL/L (ref 3.5–5.3)
PROT SERPL-MCNC: 7.6 G/DL (ref 6.4–8.4)
RBC # BLD AUTO: 5.11 MILLION/UL (ref 3.81–5.12)
SODIUM SERPL-SCNC: 138 MMOL/L (ref 135–147)
WBC # BLD AUTO: 8.33 THOUSAND/UL (ref 4.31–10.16)

## 2024-02-12 PROCEDURE — 36415 COLL VENOUS BLD VENIPUNCTURE: CPT

## 2024-02-12 PROCEDURE — 85025 COMPLETE CBC W/AUTO DIFF WBC: CPT

## 2024-02-12 PROCEDURE — 85652 RBC SED RATE AUTOMATED: CPT

## 2024-02-12 PROCEDURE — 86038 ANTINUCLEAR ANTIBODIES: CPT

## 2024-02-12 PROCEDURE — 99214 OFFICE O/P EST MOD 30 MIN: CPT | Performed by: FAMILY MEDICINE

## 2024-02-12 PROCEDURE — 87389 HIV-1 AG W/HIV-1&-2 AB AG IA: CPT

## 2024-02-12 PROCEDURE — 80053 COMPREHEN METABOLIC PANEL: CPT

## 2024-02-12 PROCEDURE — 86140 C-REACTIVE PROTEIN: CPT

## 2024-02-12 PROCEDURE — 82550 ASSAY OF CK (CPK): CPT

## 2024-02-12 RX ORDER — GABAPENTIN 300 MG/1
300 CAPSULE ORAL 3 TIMES DAILY
Qty: 30 CAPSULE | Refills: 0 | Status: SHIPPED | OUTPATIENT
Start: 2024-02-12 | End: 2024-02-20 | Stop reason: SDUPTHER

## 2024-02-12 NOTE — TELEPHONE ENCOUNTER
----- Message from Nikkie Hartmann DO sent at 2/12/2024 11:29 AM EST -----  Regarding: Lab work  Please call and notify patient that there were 2 blood tests that were not collected due to incorrect dates on the lab.  Please encourage her to go back and have them completed anytime over the next week or so whenever she is able

## 2024-02-12 NOTE — LETTER
February 12, 2024     Patient: Zoila Guerrero  YOB: 1975  Date of Visit: 2/12/2024      To Whom it May Concern:    Zoila Guerrero is under my professional care. Zoila was seen in my office on 2/12/2024. Zoila may return to work on 2/16/2024 .    If you have any questions or concerns, please don't hesitate to call.         Sincerely,          Dr. Nikkie Hartmann DO

## 2024-02-12 NOTE — PROGRESS NOTES
Name: Zoila Guerrero      : 1975      MRN: [id number]  Encounter Provider: Nikkie Hartmann DO  Encounter Date: 2024   Encounter department: [facility] [place]    Assessment & Plan     1. Abnormal gait due to muscle weakness  2. Paresthesia and pain of both upper extremities  3. Paresthesia and pain of extremity-etiology of reported symptoms are unclear.  Differential includes polymyositis, multiple sclerosis, fibromyalgia, myasthenia gravis, HIV, idiopathic inflammatory myopathy, or mood disorder related.  Will start workup with studies listed below.  Patient encouraged to present to the ER for visual changes, severe headaches or headaches, or falls        -     MRI brain wo contrast; Future; Expected date: 2024  -     gabapentin (Neurontin) 300 mg capsule; Take 1 capsule (300 mg total) by mouth 3 (three) times a day  -     LISA Screen w/ Reflex to Titer/Pattern; Future  -     Comprehensive metabolic panel; Future; Expected date: 2024  -     CK; Future  -     C-reactive protein; Future  -     Sedimentation rate, automated; Future  -     CBC and differential; Future  -     HIV 1/2 weeks AG/AB W reflex [facility] for 1-year-old with above; Future                Subjective      Pt c/o  parastheisa, pain and weakness of b/l leg and arm. Onset on Friday.  Legs feel very heavy. The right foot is burning and the feet are swelling. Associated with numbness on the face down the middle of the face on along the uppper left side o th face.   Reports a electric like sensation zaps down her body starting at the head and goes down the body. This started a while ago. Would happen occasionally. But since Friday it has been happening daily. Last for a few seconds. Denies falling, change in vison or recent viral illness.         Review of Systems   Constitutional:  Negative for fever.   HENT:  Negative for congestion.    Respiratory:  Negative for cough and shortness of breath.     Cardiovascular:  Negative for chest pain.   Gastrointestinal:  Negative for diarrhea, nausea and vomiting.   Musculoskeletal:  Positive for myalgias.   Skin:  Negative for rash.   Neurological:  Positive for weakness, light-headedness and numbness. Negative for dizziness, seizures, speech difficulty and headaches.   Psychiatric/Behavioral:  Positive for sleep disturbance.        Current Outpatient Medications on File Prior to Visit   Medication Sig    metFORMIN (GLUCOPHAGE) 500 mg tablet Take 500 mg by mouth 2 (two) times a day with meals    omeprazole (PriLOSEC) 40 MG capsule take 1 capsule by mouth twice a day    RA SENNA-LAX PO take 1 tablet by mouth once daily    venlafaxine (EFFEXOR-XR) 150 mg 24 hr capsule take 1 capsule by mouth at bedtime AFTER COMPLETION OF 75MG    venlafaxine (EFFEXOR-XR) 75 mg 24 hr capsule TAKE 1 CAPSULE ORALLY  DAILY WITH FOOD (WITH 150MG)    eszopiclone (LUNESTA) 3 MG tablet take 1 tablet by mouth daily immediately BEFORE BEDTIME (Patient not taking: Reported on 2/12/2024)    QUEtiapine (SEROquel) 100 mg tablet take 1 AND 1/2 tablets by mouth daily if needed for insomnia (Patient not taking: Reported on 2/12/2024)    [DISCONTINUED] acetaminophen (TYLENOL) 500 mg tablet Take 2 tablets (1,000 mg total) by mouth every 8 (eight) hours as needed for mild pain, moderate pain or headaches (Patient not taking: Reported on 8/7/2023)    [DISCONTINUED] albuterol (2.5 mg/3 mL) 0.083 % nebulizer solution Take 6 mL (5 mg total) by nebulization every 6 (six) hours as needed for wheezing (Patient not taking: Reported on 2/12/2024)    [DISCONTINUED] dicyclomine (BENTYL) 20 mg tablet Take 1 tablet (20 mg total) by mouth 2 (two) times a day (Patient not taking: Reported on 2/12/2024)    [DISCONTINUED] famotidine-calcium carbonate-magnesium hydroxide (PEPCID COMPLETE) -165 MG CHEW Chew 1 tablet 2 (two) times a day for 25 doses (Patient not taking: Reported on 2/12/2024)    [DISCONTINUED]  ondansetron (ZOFRAN) 4 mg tablet Take 1 tablet (4 mg total) by mouth every 8 (eight) hours as needed for nausea or vomiting (Patient not taking: Reported on 2/12/2024)    [DISCONTINUED] ondansetron (ZOFRAN-ODT) 4 mg disintegrating tablet Take 1 tablet (4 mg total) by mouth every 6 (six) hours as needed for nausea or vomiting (Patient not taking: Reported on 2/12/2024)       Objective     /72 (BP Location: Left arm, Patient Position: Sitting, Cuff Size: Large)   Pulse 94   Temp 97.6 °F (36.4 °C) (Temporal)   Resp 18   Wt 83.5 kg (184 lb)   SpO2 98%   BMI 34.77 kg/m²     Physical Exam  HENT:      Head: Normocephalic and atraumatic.      Right Ear: Tympanic membrane and external ear normal.      Left Ear: Tympanic membrane and external ear normal.   Eyes:      Extraocular Movements: Extraocular movements intact.      Conjunctiva/sclera: Conjunctivae normal.      Pupils: Pupils are equal, round, and reactive to light.   Cardiovascular:      Rate and Rhythm: Normal rate and regular rhythm.   Neurological:      Mental Status: She is alert and oriented to person, place, and time.      Cranial Nerves: Cranial nerve deficit (SENSORY deficit noted left V1 AND v2 BRANCH) present. No facial asymmetry.      Motor: No atrophy or pronator drift.      Coordination: Romberg sign negative. Heel to Shin Test abnormal.      Gait: Gait abnormal.      Deep Tendon Reflexes: Reflexes normal.       Nikkie Hartmann DO

## 2024-02-13 LAB
HIV 1+2 AB+HIV1 P24 AG SERPL QL IA: NORMAL
HIV 2 AB SERPL QL IA: NORMAL
HIV1 AB SERPL QL IA: NORMAL
HIV1 P24 AG SERPL QL IA: NORMAL

## 2024-02-14 ENCOUNTER — PATIENT MESSAGE (OUTPATIENT)
Age: 49
End: 2024-02-14

## 2024-02-14 DIAGNOSIS — R20.2 PARESTHESIA AND PAIN OF BOTH UPPER EXTREMITIES: Primary | ICD-10-CM

## 2024-02-14 DIAGNOSIS — M79.602 PARESTHESIA AND PAIN OF BOTH UPPER EXTREMITIES: Primary | ICD-10-CM

## 2024-02-14 DIAGNOSIS — M79.601 PARESTHESIA AND PAIN OF BOTH UPPER EXTREMITIES: Primary | ICD-10-CM

## 2024-02-15 DIAGNOSIS — M25.572 PAIN IN JOINTS OF BOTH FEET: Primary | ICD-10-CM

## 2024-02-15 DIAGNOSIS — M25.571 PAIN IN JOINTS OF BOTH FEET: Primary | ICD-10-CM

## 2024-02-15 RX ORDER — PREDNISONE 10 MG/1
TABLET ORAL DAILY
Qty: 13 TABLET | Refills: 0 | Status: SHIPPED | OUTPATIENT
Start: 2024-02-15 | End: 2024-02-22

## 2024-02-20 ENCOUNTER — APPOINTMENT (OUTPATIENT)
Age: 49
End: 2024-02-20
Payer: COMMERCIAL

## 2024-02-20 ENCOUNTER — OFFICE VISIT (OUTPATIENT)
Age: 49
End: 2024-02-20
Payer: COMMERCIAL

## 2024-02-20 VITALS
TEMPERATURE: 96 F | WEIGHT: 181.6 LBS | SYSTOLIC BLOOD PRESSURE: 120 MMHG | BODY MASS INDEX: 34.29 KG/M2 | OXYGEN SATURATION: 95 % | HEIGHT: 61 IN | HEART RATE: 83 BPM | DIASTOLIC BLOOD PRESSURE: 70 MMHG | RESPIRATION RATE: 18 BRPM

## 2024-02-20 DIAGNOSIS — M25.50 ARTHRALGIA, UNSPECIFIED JOINT: ICD-10-CM

## 2024-02-20 DIAGNOSIS — R20.2 PARESTHESIA AND PAIN OF BOTH UPPER EXTREMITIES: ICD-10-CM

## 2024-02-20 DIAGNOSIS — L50.9 WELT: ICD-10-CM

## 2024-02-20 DIAGNOSIS — M79.602 PARESTHESIA AND PAIN OF BOTH UPPER EXTREMITIES: ICD-10-CM

## 2024-02-20 DIAGNOSIS — M25.50 ARTHRALGIA, UNSPECIFIED JOINT: Primary | ICD-10-CM

## 2024-02-20 DIAGNOSIS — M79.601 PARESTHESIA AND PAIN OF BOTH UPPER EXTREMITIES: ICD-10-CM

## 2024-02-20 LAB
FOLATE SERPL-MCNC: 12.8 NG/ML
URATE SERPL-MCNC: 6.9 MG/DL (ref 2–7.5)
VIT B12 SERPL-MCNC: 201 PG/ML (ref 180–914)

## 2024-02-20 PROCEDURE — 82746 ASSAY OF FOLIC ACID SERUM: CPT

## 2024-02-20 PROCEDURE — 86200 CCP ANTIBODY: CPT

## 2024-02-20 PROCEDURE — 36415 COLL VENOUS BLD VENIPUNCTURE: CPT

## 2024-02-20 PROCEDURE — 86235 NUCLEAR ANTIGEN ANTIBODY: CPT

## 2024-02-20 PROCEDURE — 86430 RHEUMATOID FACTOR TEST QUAL: CPT

## 2024-02-20 PROCEDURE — 84550 ASSAY OF BLOOD/URIC ACID: CPT

## 2024-02-20 PROCEDURE — 99214 OFFICE O/P EST MOD 30 MIN: CPT

## 2024-02-20 PROCEDURE — 86618 LYME DISEASE ANTIBODY: CPT

## 2024-02-20 PROCEDURE — 82607 VITAMIN B-12: CPT

## 2024-02-20 RX ORDER — GABAPENTIN 300 MG/1
300 CAPSULE ORAL 3 TIMES DAILY
Qty: 30 CAPSULE | Refills: 0 | Status: SHIPPED | OUTPATIENT
Start: 2024-02-20 | End: 2024-03-01 | Stop reason: SDUPTHER

## 2024-02-20 NOTE — LETTER
February 20, 2024     Patient: Zoila Guerrero  YOB: 1975  Date of Visit: 2/20/2024      To Whom it May Concern:    Zoila Guerrero is under my professional care. Zoila was seen in my office on 2/20/2024. Zoila may return to work on February 26, 2024 .    If you have any questions or concerns, please don't hesitate to call.         Sincerely,          April Belkis Ng PA-C        CC: No Recipients

## 2024-02-20 NOTE — PROGRESS NOTES
Name: Zoila Guerrero      : 1975      MRN: [id number]  Encounter Provider: Kate Ng PA-C  Encounter Date: 2024   Encounter department: [facility] PRIMARY [facility] [place]    Assessment & Plan     1. Arthralgia, unspecified joint  -     RF Screen w/ Reflex to Titer; Future; Expected date: 2024  -     Cyclic citrul peptide antibody, IgG; Future; Expected date: 2024  -     Sjogren's Antibodies; Future; Expected date: 2024  -     Uric acid; Future; Expected date: 2024  -     Lyme Total AB W Reflex to IGM/IGG; Future; Expected date: 2024  - Vitamin B12/folate panel  -     Diclofenac Sodium (VOLTAREN) 1 %; Apply 2 g topically 4 (four) times a day    Will check other autoimmune labs since pt's rheumatology appointment isn't until April.  She has an MRI brain scheduled for Thursday.  Will call pt with results.  Work note given to be off for the rest of the week.  Pt will come back to have work physical form completed in the hopes that her pain and paresthesias improve.  Can use diclofenac and ibuprofen 600 mg every 6 hours for the pain as needed.  Watch for GI upset.    Differential includes MS, SSRI withdrawal, RA, Lyme's, other autoimmune disorder or even psychiatric-related.      2. Paresthesia and pain of both upper extremities  -     gabapentin (Neurontin) 300 mg capsule; Take 1 capsule (300 mg total) by mouth 3 (three) times a day    Will continue the gabapentin.  She is nearly done with the prednisone taper.  Depending on results of MRI, will refer to neurology.    3. Welt    Based on photograph, looks like a bug bite vs allergic contact.  Pt to check bed and change bedding.  Can use hydrocortisone cream on the welt if it is itching.           Subjective      HPI    Pt is here due to joint pain in her R foot and R hand, though she does have some difficulty walking.  She is using the gabapentin and prednisone taper.  She has tried 800 mg ibuprofen.  She  describes inability to walk at times due to the pain and she has numbness and zapping down the right side of her body.  Denies trauma to the hand or foot.  Did not fall or hurt her head/neck/spine.  Sensation feels different between the right and left side.    Pt reduced her dose of Effexor from 150 mg to 75 mg a day.  This was a couple of months ago and not correlated with her current symptoms.    Denies fevers, chills, nightsweats, recent weight loss, N/V.    Pt also states she has new blisters coming up around her neck and upper chest.  They come and go, were present this morning and now they are gone.  Denies any new detergents or lotions.  Started prednisone recently, but otherwise nothing new.  No recent travel.  Pt shows a picture of a welt on her neck.  It is no longer there.    Review of Systems   Constitutional:  Negative for diaphoresis, fatigue, fever and unexpected weight change.   Respiratory:  Negative for cough and shortness of breath.    Cardiovascular:  Negative for chest pain, palpitations and leg swelling.   Musculoskeletal:  Positive for arthralgias and gait problem.   Skin:  Negative for rash (welt on neck).   Neurological:  Positive for weakness (right side of body) and numbness. Negative for seizures, syncope and facial asymmetry.   All other systems reviewed and are negative.      Current Outpatient Medications on File Prior to Visit   Medication Sig   • metFORMIN (GLUCOPHAGE) 500 mg tablet Take 500 mg by mouth 2 (two) times a day with meals   • omeprazole (PriLOSEC) 40 MG capsule take 1 capsule by mouth twice a day   • predniSONE 10 mg tablet Take 3 tablets (30 mg total) by mouth daily for 2 days, THEN 2 tablets (20 mg total) daily for 2 days, THEN 1 tablet (10 mg total) daily for 3 days.   • RA SENNA-LAX PO take 1 tablet by mouth once daily   • venlafaxine (EFFEXOR-XR) 75 mg 24 hr capsule TAKE 1 CAPSULE ORALLY  DAILY WITH FOOD (WITH 150MG)   • [DISCONTINUED] gabapentin (Neurontin) 300 mg  "capsule Take 1 capsule (300 mg total) by mouth 3 (three) times a day   • [DISCONTINUED] eszopiclone (LUNESTA) 3 MG tablet take 1 tablet by mouth daily immediately BEFORE BEDTIME (Patient not taking: Reported on 2/20/2024)   • [DISCONTINUED] QUEtiapine (SEROquel) 100 mg tablet take 1 AND 1/2 tablets by mouth daily if needed for insomnia (Patient not taking: Reported on 2/12/2024)   • [DISCONTINUED] venlafaxine (EFFEXOR-XR) 150 mg 24 hr capsule take 1 capsule by mouth at bedtime AFTER COMPLETION OF 75MG (Patient not taking: Reported on 2/20/2024)       Objective     /70 (BP Location: Left arm, Patient Position: Sitting, Cuff Size: Standard)   Pulse 83   Temp (!) 96 °F (35.6 °C) (Tympanic)   Resp 18   Ht 5' 1\" (1.549 m)   Wt 82.4 kg (181 lb 9.6 oz)   SpO2 95%   BMI 34.31 kg/m²     Physical Exam  Vitals reviewed.   Constitutional:       General: She is not in acute distress.     Appearance: Normal appearance. She is not toxic-appearing or diaphoretic.   HENT:      Head: Normocephalic and atraumatic.      Right Ear: Hearing normal.      Left Ear: Hearing normal.      Nose: Nose normal.      Mouth/Throat:      Lips: Pink.      Mouth: Mucous membranes are moist.   Eyes:      General: Lids are normal. No scleral icterus.     Pupils: Pupils are equal, round, and reactive to light.   Cardiovascular:      Rate and Rhythm: Normal rate and regular rhythm.      Pulses:           Radial pulses are 2+ on the right side and 2+ on the left side.        Dorsalis pedis pulses are 2+ on the right side and 2+ on the left side.      Heart sounds: Normal heart sounds. No murmur heard.  Pulmonary:      Effort: Pulmonary effort is normal. No respiratory distress.      Breath sounds: Normal breath sounds.   Musculoskeletal:      Cervical back: Full passive range of motion without pain and neck supple.      Right lower leg: No edema.      Left lower leg: No edema.      Comments: Pt has good strength of upper and lower extremities " bilaterally, similar strength right and left.  Pt reports altered sensation of the right lower extremity compared with the normal sensation on the left.  She walks with a slightly antalgic gait due to pain in her right lateral foot.  No deformity, swelling, erythyema present.   Lymphadenopathy:      Cervical: No cervical adenopathy.   Skin:     General: Skin is warm and dry.      Capillary Refill: Capillary refill takes less than 2 seconds.      Coloration: Skin is not cyanotic or jaundiced.   Neurological:      General: No focal deficit present.      Mental Status: She is alert and oriented to person, place, and time.   Psychiatric:         Mood and Affect: Mood is anxious.         Behavior: Behavior is cooperative.       Kate Ng PA-C

## 2024-02-21 LAB
B BURGDOR IGG+IGM SER QL IA: NEGATIVE
RHEUMATOID FACT SER QL LA: NEGATIVE

## 2024-02-22 ENCOUNTER — HOSPITAL ENCOUNTER (OUTPATIENT)
Dept: MRI IMAGING | Facility: HOSPITAL | Age: 49
End: 2024-02-22
Payer: COMMERCIAL

## 2024-02-22 DIAGNOSIS — M62.81 ABNORMAL GAIT DUE TO MUSCLE WEAKNESS: ICD-10-CM

## 2024-02-22 DIAGNOSIS — R26.9 ABNORMAL GAIT DUE TO MUSCLE WEAKNESS: ICD-10-CM

## 2024-02-22 LAB
ENA SS-A AB SER-ACNC: <0.2 AI (ref 0–0.9)
ENA SS-B AB SER-ACNC: <0.2 AI (ref 0–0.9)

## 2024-02-22 PROCEDURE — 70551 MRI BRAIN STEM W/O DYE: CPT

## 2024-02-22 PROCEDURE — G1004 CDSM NDSC: HCPCS

## 2024-02-23 LAB — CCP AB SER IA-ACNC: 1.1

## 2024-02-27 ENCOUNTER — PATIENT MESSAGE (OUTPATIENT)
Age: 49
End: 2024-02-27

## 2024-02-27 DIAGNOSIS — R53.1 RIGHT SIDED WEAKNESS: ICD-10-CM

## 2024-02-27 DIAGNOSIS — M62.81 ABNORMAL GAIT DUE TO MUSCLE WEAKNESS: Primary | ICD-10-CM

## 2024-02-27 DIAGNOSIS — R26.9 ABNORMAL GAIT DUE TO MUSCLE WEAKNESS: Primary | ICD-10-CM

## 2024-02-29 ENCOUNTER — PATIENT MESSAGE (OUTPATIENT)
Age: 49
End: 2024-02-29

## 2024-02-29 DIAGNOSIS — R20.2 PARESTHESIA AND PAIN OF BOTH UPPER EXTREMITIES: ICD-10-CM

## 2024-02-29 DIAGNOSIS — G93.2 INTRACRANIAL HYPERTENSION: Primary | ICD-10-CM

## 2024-02-29 DIAGNOSIS — M79.601 PARESTHESIA AND PAIN OF BOTH UPPER EXTREMITIES: ICD-10-CM

## 2024-02-29 DIAGNOSIS — M79.602 PARESTHESIA AND PAIN OF BOTH UPPER EXTREMITIES: ICD-10-CM

## 2024-03-01 RX ORDER — ACETAZOLAMIDE 500 MG/1
500 CAPSULE, EXTENDED RELEASE ORAL 2 TIMES DAILY
Qty: 28 CAPSULE | Refills: 0 | Status: SHIPPED | OUTPATIENT
Start: 2024-03-01 | End: 2024-03-15

## 2024-03-01 RX ORDER — GABAPENTIN 300 MG/1
300 CAPSULE ORAL 3 TIMES DAILY
Qty: 90 CAPSULE | Refills: 1 | Status: SHIPPED | OUTPATIENT
Start: 2024-03-01

## 2024-03-04 ENCOUNTER — TELEPHONE (OUTPATIENT)
Age: 49
End: 2024-03-04

## 2024-03-14 ENCOUNTER — PATIENT MESSAGE (OUTPATIENT)
Age: 49
End: 2024-03-14

## 2024-03-14 DIAGNOSIS — G93.2 INTRACRANIAL HYPERTENSION: ICD-10-CM

## 2024-03-14 RX ORDER — ACETAZOLAMIDE 500 MG/1
500 CAPSULE, EXTENDED RELEASE ORAL 2 TIMES DAILY
Qty: 28 CAPSULE | Refills: 0 | Status: SHIPPED | OUTPATIENT
Start: 2024-03-14 | End: 2024-03-28

## 2024-03-15 ENCOUNTER — TELEPHONE (OUTPATIENT)
Age: 49
End: 2024-03-15

## 2024-03-23 DIAGNOSIS — G93.2 INTRACRANIAL HYPERTENSION: ICD-10-CM

## 2024-03-25 RX ORDER — ACETAZOLAMIDE 500 MG/1
500 CAPSULE, EXTENDED RELEASE ORAL 2 TIMES DAILY
Qty: 28 CAPSULE | Refills: 0 | Status: SHIPPED | OUTPATIENT
Start: 2024-03-25

## 2024-03-26 ENCOUNTER — OFFICE VISIT (OUTPATIENT)
Age: 49
End: 2024-03-26
Payer: COMMERCIAL

## 2024-03-26 VITALS
OXYGEN SATURATION: 100 % | RESPIRATION RATE: 18 BRPM | BODY MASS INDEX: 34.01 KG/M2 | WEIGHT: 180 LBS | TEMPERATURE: 99.9 F | SYSTOLIC BLOOD PRESSURE: 130 MMHG | HEART RATE: 102 BPM | DIASTOLIC BLOOD PRESSURE: 83 MMHG

## 2024-03-26 DIAGNOSIS — J11.1 INFLUENZA-LIKE ILLNESS: ICD-10-CM

## 2024-03-26 DIAGNOSIS — R52 BODY ACHES: Primary | ICD-10-CM

## 2024-03-26 PROCEDURE — 87636 SARSCOV2 & INF A&B AMP PRB: CPT | Performed by: EMERGENCY MEDICINE

## 2024-03-26 PROCEDURE — 99283 EMERGENCY DEPT VISIT LOW MDM: CPT | Performed by: EMERGENCY MEDICINE

## 2024-03-26 PROCEDURE — G0382 LEV 3 HOSP TYPE B ED VISIT: HCPCS | Performed by: EMERGENCY MEDICINE

## 2024-03-26 RX ORDER — OSELTAMIVIR PHOSPHATE 75 MG/1
75 CAPSULE ORAL EVERY 12 HOURS SCHEDULED
Qty: 10 CAPSULE | Refills: 0 | Status: SHIPPED | OUTPATIENT
Start: 2024-03-26 | End: 2024-03-31

## 2024-03-26 RX ORDER — IBUPROFEN 800 MG/1
800 TABLET ORAL 2 TIMES DAILY
Qty: 20 TABLET | Refills: 0 | Status: SHIPPED | OUTPATIENT
Start: 2024-03-26 | End: 2024-04-05

## 2024-03-26 NOTE — PATIENT INSTRUCTIONS
Encourage liquids and rest  Meds as prescribed  Tylenol every 4 hours for fever  F/u with PCP in 2-3 days  Proceed to the ER if symptoms get worse

## 2024-03-26 NOTE — PROGRESS NOTES
[facility]        NAME: Zoila Guerrero is a 48 y.o. female  : 1975    MRN: [id number]  DATE: 2024  TIME: 8:48 PM    Assessment and Plan   Body aches [R52]  1. Body aches  Covid/Flu- Office Collect Normal    Covid/Flu- Office Collect Normal    ibuprofen (MOTRIN) 800 mg tablet      2. Influenza-like illness  oseltamivir (TAMIFLU) 75 mg capsule        Covid/flu sent    Patient Instructions     Patient Instructions    Encourage liquids and rest  Meds as prescribed  Tylenol every 4 hours for fever  F/u with PCP in 2-3 days  Proceed to the ER if symptoms get worse      Follow up with PCP in 3-5 days.  Proceed to  ER if symptoms worsen.    Chief Complaint     Chief Complaint   Patient presents with    Generalized Body Aches     Pt states yesterday she developed a cough, chest congestion, sore throat, and body aches. Exposed to the flu          History of Present Illness       48-year-old female with a chief complaint that yesterday she developed a cough, chest congestion, sore throat, and body aches. Pt's son has the flu             Review of Systems   Review of Systems   Constitutional:  Positive for chills and fatigue. Negative for diaphoresis and fever.   HENT:  Positive for congestion and sore throat. Negative for ear pain and nosebleeds.    Eyes:  Negative for photophobia, pain, discharge and visual disturbance.   Respiratory:  Positive for cough. Negative for choking, chest tightness, shortness of breath and wheezing.    Cardiovascular:  Negative for chest pain and palpitations.   Gastrointestinal:  Negative for abdominal distention, abdominal pain, diarrhea and vomiting.   Genitourinary:  Negative for dysuria, flank pain and frequency.   Musculoskeletal:  Positive for myalgias. Negative for back pain, gait problem and joint swelling.   Skin:  Negative for color change and rash.   Neurological:  Positive for headaches. Negative for dizziness and syncope.   Psychiatric/Behavioral:   Negative for behavioral problems and confusion. The patient is not nervous/anxious.    All other systems reviewed and are negative.        Current Medications       Current Outpatient Medications:     acetaZOLAMIDE (DIAMOX) 500 mg capsule, take 1 capsule by mouth twice a day for 14 days, Disp: 28 capsule, Rfl: 0    Diclofenac Sodium (VOLTAREN) 1 %, Apply 2 g topically 4 (four) times a day, Disp: 150 g, Rfl: 1    gabapentin (Neurontin) 300 mg capsule, Take 1 capsule (300 mg total) by mouth 3 (three) times a day, Disp: 90 capsule, Rfl: 1    ibuprofen (MOTRIN) 800 mg tablet, Take 1 tablet (800 mg total) by mouth 2 (two) times a day for 10 days, Disp: 20 tablet, Rfl: 0    metFORMIN (GLUCOPHAGE) 500 mg tablet, Take 500 mg by mouth 2 (two) times a day with meals, Disp: , Rfl:     omeprazole (PriLOSEC) 40 MG capsule, take 1 capsule by mouth twice a day, Disp: 180 capsule, Rfl: 1    oseltamivir (TAMIFLU) 75 mg capsule, Take 1 capsule (75 mg total) by mouth every 12 (twelve) hours for 5 days, Disp: 10 capsule, Rfl: 0    venlafaxine (EFFEXOR-XR) 75 mg 24 hr capsule, TAKE 1 CAPSULE ORALLY  DAILY WITH FOOD (WITH 150MG), Disp: , Rfl:     RA SENNA-LAX PO, take 1 tablet by mouth once daily (Patient not taking: Reported on 3/26/2024), Disp: , Rfl:     Current Allergies     Allergies as of 2024    (No Known Allergies)            The following portions of the patient's history were reviewed and updated as appropriate: allergies, current medications, past family history, past medical history, past social history, past surgical history and problem list.     Past Medical History:   Diagnosis Date    Bladder perforation, intraoperative     Depression     Intestine, perforation (HCC)     intra op    Sepsis (HCC)        Past Surgical History:   Procedure Laterality Date    ABDOMINAL SURGERY      BLADDER SURGERY       SECTION      COLONOSCOPY      CT NEEDLE BIOPSY RETROPERITONEUM  2020    HYSTERECTOMY  2015    INCISIONAL  HERNIA REPAIR N/A 03/22/2021    Procedure: OPEN REPAIR VENTRAL/INCISIONAL HERNIA W/MESH;  Surgeon: Glendy Mcmahon MD;  Location: [facility];  Service: General    OOPHORECTOMY Bilateral 2015       Family History   Problem Relation Age of Onset    No Known Problems Mother     Pancreatic cancer Father     No Known Problems Sister     No Known Problems Sister     No Known Problems Sister     No Known Problems Daughter     Cervical cancer Maternal Grandmother     No Known Problems Paternal Grandmother     No Known Problems Maternal Aunt     No Known Problems Maternal Aunt     No Known Problems Maternal Aunt     No Known Problems Maternal Aunt     No Known Problems Paternal Aunt     Pancreatic cancer Paternal Aunt     Breast cancer Neg Hx     Endometrial cancer Neg Hx     Ovarian cancer Neg Hx          Medications have been verified.        Objective   /83   Pulse 102   Temp 99.9 °F (37.7 °C)   Resp 18   Wt 81.6 kg (180 lb)   SpO2 100%   BMI 34.01 kg/m²        Physical Exam     Physical Exam  Vitals reviewed.   Constitutional:       General: She is not in acute distress.     Appearance: She is well-developed. She is ill-appearing. She is not toxic-appearing or diaphoretic.      Comments: 48-year-old female sitting on exam table who looks flulike   HENT:      Head: Normocephalic and atraumatic.      Nose: Congestion present.      Mouth/Throat:      Pharynx: Oropharynx is clear. Posterior oropharyngeal erythema present.      Comments: Mild erythema of the posterior pharynx  Eyes:      General: No scleral icterus.     Extraocular Movements: Extraocular movements intact.      Pupils: Pupils are equal, round, and reactive to light.   Cardiovascular:      Rate and Rhythm: Normal rate.      Heart sounds: Normal heart sounds.   Pulmonary:      Effort: Pulmonary effort is normal. No respiratory distress.      Breath sounds: Normal breath sounds. No stridor. No wheezing, rhonchi or rales.      Comments: Positive  cough  Chest:      Chest wall: No tenderness.   Abdominal:      General: Abdomen is flat. Bowel sounds are normal. There is no distension.      Palpations: Abdomen is soft. There is no shifting dullness, hepatomegaly, splenomegaly or mass.      Tenderness: There is no abdominal tenderness. There is no right CVA tenderness, left CVA tenderness or guarding. Negative signs include Valera's sign and McBurney's sign.      Hernia: No hernia is present.   Skin:     General: Skin is warm and dry.      Coloration: Skin is pale. Skin is not cyanotic, jaundiced or mottled.      Findings: No erythema.   Neurological:      General: No focal deficit present.      Mental Status: She is alert and oriented to person, place, and time.   Psychiatric:         Mood and Affect: Mood normal.

## 2024-03-26 NOTE — LETTER
March 26, 2024     Patient: Zoila Guerrero   YOB: 1975   Date of Visit: 3/26/2024       To Whom It May Concern:    It is my medical opinion that Zoila Guerrero should remain out of work until 03/29/2024 .    If you have any questions or concerns, please don't hesitate to call.         Sincerely,        Brittany Johnson, DO    CC: No Recipients

## 2024-03-27 LAB
FLUAV RNA RESP QL NAA+PROBE: POSITIVE
FLUBV RNA RESP QL NAA+PROBE: NEGATIVE
SARS-COV-2 RNA RESP QL NAA+PROBE: NEGATIVE

## 2024-04-05 ENCOUNTER — TELEPHONE (OUTPATIENT)
Age: 49
End: 2024-04-05

## 2024-04-25 ENCOUNTER — TELEPHONE (OUTPATIENT)
Age: 49
End: 2024-04-25

## 2024-04-25 ENCOUNTER — OFFICE VISIT (OUTPATIENT)
Dept: RHEUMATOLOGY | Facility: CLINIC | Age: 49
End: 2024-04-25
Payer: COMMERCIAL

## 2024-04-25 VITALS
WEIGHT: 173 LBS | HEART RATE: 84 BPM | TEMPERATURE: 98.7 F | BODY MASS INDEX: 32.66 KG/M2 | SYSTOLIC BLOOD PRESSURE: 122 MMHG | OXYGEN SATURATION: 98 % | HEIGHT: 61 IN | DIASTOLIC BLOOD PRESSURE: 78 MMHG

## 2024-04-25 DIAGNOSIS — R29.898 WEAKNESS OF BOTH LOWER EXTREMITIES: ICD-10-CM

## 2024-04-25 DIAGNOSIS — M65.321 TRIGGER FINGER OF ALL DIGITS OF RIGHT HAND: ICD-10-CM

## 2024-04-25 DIAGNOSIS — M79.7 FIBROMYALGIA: ICD-10-CM

## 2024-04-25 DIAGNOSIS — M65.351 TRIGGER FINGER OF ALL DIGITS OF RIGHT HAND: ICD-10-CM

## 2024-04-25 DIAGNOSIS — M79.601 PARESTHESIA AND PAIN OF BOTH UPPER EXTREMITIES: ICD-10-CM

## 2024-04-25 DIAGNOSIS — M79.602 PARESTHESIA AND PAIN OF BOTH UPPER EXTREMITIES: ICD-10-CM

## 2024-04-25 DIAGNOSIS — R20.2 PARESTHESIA AND PAIN OF BOTH UPPER EXTREMITIES: ICD-10-CM

## 2024-04-25 DIAGNOSIS — M65.331 TRIGGER FINGER OF ALL DIGITS OF RIGHT HAND: ICD-10-CM

## 2024-04-25 DIAGNOSIS — R20.2 PARESTHESIA OF BOTH LOWER EXTREMITIES: Primary | ICD-10-CM

## 2024-04-25 DIAGNOSIS — M65.311 TRIGGER FINGER OF ALL DIGITS OF RIGHT HAND: ICD-10-CM

## 2024-04-25 DIAGNOSIS — M65.341 TRIGGER FINGER OF ALL DIGITS OF RIGHT HAND: ICD-10-CM

## 2024-04-25 PROCEDURE — 99204 OFFICE O/P NEW MOD 45 MIN: CPT | Performed by: PHYSICIAN ASSISTANT

## 2024-04-25 NOTE — TELEPHONE ENCOUNTER
Patient is being referred to a orthopedics. Please schedule accordingly.    [facility]   [phone]

## 2024-04-25 NOTE — PROGRESS NOTES
Assessment and Plan:  Ms. Guerrero is a 48-year-old female with past medical history significant for fibromyalgia, chronic pain syndrome, lumbar DDD, BASIA, MDD, and vitamin D deficiency who presents for rheumatology evaluation of paresthesia and pain of both upper extremities.    Laboratory workup for evaluation of paresthesia and pain was ordered by the primary team in February 2024 which returned unremarkable in terms of LISA, RF, CCP, Sjogren's antibodies, uric acid, Lyme, CK, ESR.  CRP returned at 8.3.    The patient's presentation is most consistent with a radiculopathy and potential other neurological etiology causing paresthesias, weakness, and pain of the bilateral lower extremities.  I have ordered bilateral lower extremity EMG for workup and recommend follow-up with neurology as scheduled.    Additionally, she has 4 trigger fingers on the right hand and therefore I have placed referral to orthopedics.    No further rheumatology follow-up needed, as no underlying autoimmune disease or inflammatory arthritis evident based on history and physical exam as well as laboratory workup.  She may continue follow-up with the primary team for treatment of fibromyalgia.    Thank you very much for this consultation    Plan:  Diagnoses and all orders for this visit:    Paresthesia of both lower extremities  -     EMG 2 limb lower extremity; Future    Trigger finger of all digits of right hand  -     Ambulatory Referral to Orthopedic Surgery    Weakness of both lower extremities  -     EMG 2 limb lower extremity; Future    Paresthesia and pain of both upper extremities  -     Ambulatory Referral to Rheumatology  -     EMG 2 limb lower extremity; Future    Fibromyalgia        I have personally reviewed prior notes, recent laboratory results, and pertinent films in PACS.     Activities as tolerated.   Exercise: try to maintain a low impact exercise regimen as much as possible. Walk for 30 minutes a day for at least 3 days a  week.   Continue other medications as prescribed by PCP and other specialists.       RTC not needed      Follow-up plan: Not needed        Chief Complaint  No chief complaint on file.      HPI  Zoila uGerrero is a 48 y.o.  female who presents as a Rheumatology consult referred by Nikkie Hartmann* for evaluation of paresthesia and pain both upper extremities.    The patient complains of pain and numbness of the left outer thigh area as well as difficulty walking on the right leg due to weakness and balance issues.  She also has burning pain on the outer portion of the right foot.  She is concerned about potential MS    She notes that the fingers on her right hand tend to get stuck and she needs to manually open them.  She also notices swelling on the palmar side of the hand.  She has difficulty using her hand because of this.    + Headaches and stabbing feeling on her head    She states she has generalized pains due to her history of fibromyalgia.    No joint pains or swollen joints.    Review of Systems  Review of Systems  Constitutional: Negative for fevers  ENT/Mouth: Negative for hearing changes, ear pain, nasal congestion, sinus pain, hoarseness, sore throat, rhinorrhea, swallowing difficulty.   Eyes: Negative for pain, redness, discharge, vision changes.   Cardiovascular: Negative for chest pain, SOB, palpitations.   Respiratory: Negative for cough, sputum, wheezing, dyspnea.   Gastrointestinal: Negative for nausea, vomiting, diarrhea  Genitourinary: Negative for dysuria, urinary frequency, hematuria.   Musculoskeletal: As per HPI.  Skin: Negative for skin rash, color changes.   Neuro: +weakness, numbness, tingling   Psych: + anxiety, depression.   Heme/Lymph: Negative for easy bruising, bleeding, lymphadenopathy.      Allergies  No Known Allergies    Home Medications    Current Outpatient Medications:     acetaZOLAMIDE (DIAMOX) 500 mg capsule, take 1 capsule by mouth twice a day for 14 days, Disp: 28  capsule, Rfl: 0    Diclofenac Sodium (VOLTAREN) 1 %, Apply 2 g topically 4 (four) times a day, Disp: 150 g, Rfl: 1    gabapentin (Neurontin) 300 mg capsule, Take 1 capsule (300 mg total) by mouth 3 (three) times a day, Disp: 90 capsule, Rfl: 1    omeprazole (PriLOSEC) 40 MG capsule, take 1 capsule by mouth twice a day, Disp: 180 capsule, Rfl: 1    venlafaxine (EFFEXOR-XR) 75 mg 24 hr capsule, TAKE 1 CAPSULE ORALLY  DAILY WITH FOOD (WITH 150MG), Disp: , Rfl:     ibuprofen (MOTRIN) 800 mg tablet, Take 1 tablet (800 mg total) by mouth 2 (two) times a day for 10 days, Disp: 20 tablet, Rfl: 0    metFORMIN (GLUCOPHAGE) 500 mg tablet, Take 500 mg by mouth 2 (two) times a day with meals (Patient not taking: Reported on 2024), Disp: , Rfl:     RA SENNA-LAX PO, take 1 tablet by mouth once daily (Patient not taking: Reported on 3/26/2024), Disp: , Rfl:     Past Medical History  Past Medical History:   Diagnosis Date    Bladder perforation, intraoperative     Depression     Intestine, perforation (HCC)     intra op    Sepsis (HCC)        Past Surgical History   Past Surgical History:   Procedure Laterality Date    ABDOMINAL SURGERY      BLADDER SURGERY       SECTION      COLONOSCOPY      CT NEEDLE BIOPSY RETROPERITONEUM  2020    HYSTERECTOMY  2015    INCISIONAL HERNIA REPAIR N/A 2021    Procedure: OPEN REPAIR VENTRAL/INCISIONAL HERNIA W/MESH;  Surgeon: Glendy Mcmahon MD;  Location: [facility];  Service: General    OOPHORECTOMY Bilateral        Family History    Family History   Problem Relation Age of Onset    No Known Problems Mother     Pancreatic cancer Father     No Known Problems Sister     No Known Problems Sister     No Known Problems Sister     No Known Problems Daughter     Cervical cancer Maternal Grandmother     No Known Problems Paternal Grandmother     No Known Problems Maternal Aunt     No Known Problems Maternal Aunt     No Known Problems Maternal Aunt     No Known Problems  "Maternal Aunt     No Known Problems Paternal Aunt     Pancreatic cancer Paternal Aunt     Breast cancer Neg Hx     Endometrial cancer Neg Hx     Ovarian cancer Neg Hx      No known family history of autoimmune or inflammatory diseases.    Social History  Social History     Substance and Sexual Activity   Alcohol Use Not Currently     Social History     Substance and Sexual Activity   Drug Use Never     Social History     Tobacco Use   Smoking Status Every Day    Current packs/day: 0.25    Types: Cigarettes   Smokeless Tobacco Never       Objective:  Vitals:    04/25/24 1103   BP: 122/78   Pulse: 84   Temp: 98.7 °F (37.1 °C)   SpO2: 98%   Weight: 78.5 kg (173 lb)   Height: 5' 1\" (1.549 m)       Physical Exam  General: Well appearing, well nourished, in no acute distress. Oriented x 3, normal mood and affect.  Ambulating without difficulty.  Skin: Good turgor, no rash, unusual bruising or prominent lesions.  Hair: Normal texture and distribution.  Nails: Normal color, no deformities.  HEENT:  Head: Normocephalic, atraumatic.  Eyes: Conjunctiva clear, sclera non-icteric, EOM intact.  Nose: No external lesions, mucosa non-inflamed.  Mouth: Mucous membranes moist, no mucosal lesions.  Neck: Supple  Musculoskeletal:   No asymmetry or deformities noted of bilateral upper and lower extremities.  No pain or swelling of joints bilaterally to include: shoulder, elbow, wrist, MCP I-V, PIP I-V, knee, ankle  + Decreased muscle strength of the left lower leg  Muscle strength intact of the lateral upper extremities and bilateral hips  Neurologic: Alert and oriented. No focal neurological deficits appreciated.   Psychiatric: Normal mood and affect.       Reviewed labs and imaging.    Imaging:   No results found.     Labs:   Office Visit on 03/26/2024   Component Date Value Ref Range Status    SARS-CoV-2 03/26/2024 Negative  Negative Final    INFLUENZA A PCR 03/26/2024 Positive (A)  Negative Final    INFLUENZA B PCR 03/26/2024 " Negative  Negative Final   Appointment on 02/20/2024   Component Date Value Ref Range Status    Rheumatoid Factor 02/20/2024 Negative  Negative Final    Cyclic Citrullinated Peptide Ab  02/20/2024 1.1  See comment Final    SS-A (RO) Ab 02/20/2024 <0.2  0.0 - 0.9 AI Final    SS-B (LA) Ab 02/20/2024 <0.2  0.0 - 0.9 AI Final    Uric Acid 02/20/2024 6.9  2.0 - 7.5 mg/dL Final    Specimen collection should occur prior to Metamizole administration due to the potential for falsely depressed results.    Lyme Total Antibodies 02/20/2024 Negative  Negative Final    Vitamin B-12 02/20/2024 201  180 - 914 pg/mL Final    Folate 02/20/2024 12.8  >5.9 ng/mL Final    The World Health Organization has determined deficient folate concentrations are considered to be <4.0 ng/mL.   Appointment on 02/12/2024   Component Date Value Ref Range Status    LISA 02/12/2024 Negative  Negative Final    Total CK 02/12/2024 102  26 - 192 U/L Final    CRP 02/12/2024 8.3 (H)  <3.0 mg/L Final    Sed Rate 02/12/2024 15  0 - 29 mm/hour Final    WBC 02/12/2024 8.33  4.31 - 10.16 Thousand/uL Final    RBC 02/12/2024 5.11  3.81 - 5.12 Million/uL Final    Hemoglobin 02/12/2024 14.4  11.5 - 15.4 g/dL Final    Hematocrit 02/12/2024 44.9  34.8 - 46.1 % Final    MCV 02/12/2024 88  82 - 98 fL Final    MCH 02/12/2024 28.2  26.8 - 34.3 pg Final    MCHC 02/12/2024 32.1  31.4 - 37.4 g/dL Final    RDW 02/12/2024 15.0  11.6 - 15.1 % Final    MPV 02/12/2024 10.6  8.9 - 12.7 fL Final    Platelets 02/12/2024 347  149 - 390 Thousands/uL Final    nRBC 02/12/2024 0  /100 WBCs Final    Segmented % 02/12/2024 59  43 - 75 % Final    Immature Grans % 02/12/2024 0  0 - 2 % Final    Lymphocytes % 02/12/2024 32  14 - 44 % Final    Monocytes % 02/12/2024 7  4 - 12 % Final    Eosinophils Relative 02/12/2024 1  0 - 6 % Final    Basophils Relative 02/12/2024 1  0 - 1 % Final    Absolute Neutrophils 02/12/2024 4.84  1.85 - 7.62 Thousands/µL Final    Absolute Immature Grans 02/12/2024  0.02  0.00 - 0.20 Thousand/uL Final    Absolute Lymphocytes 02/12/2024 2.70  0.60 - 4.47 Thousands/µL Final    Absolute Monocytes 02/12/2024 0.59  0.17 - 1.22 Thousand/µL Final    Eosinophils Absolute 02/12/2024 0.10  0.00 - 0.61 Thousand/µL Final    Basophils Absolute 02/12/2024 0.08  0.00 - 0.10 Thousands/µL Final    Sodium 02/12/2024 138  135 - 147 mmol/L Final    Potassium 02/12/2024 4.5  3.5 - 5.3 mmol/L Final    Chloride 02/12/2024 103  96 - 108 mmol/L Final    CO2 02/12/2024 28  21 - 32 mmol/L Final    ANION GAP 02/12/2024 7  mmol/L Final    BUN 02/12/2024 12  5 - 25 mg/dL Final    Creatinine 02/12/2024 0.74  0.60 - 1.30 mg/dL Final    Standardized to IDMS reference method    Glucose 02/12/2024 87  65 - 140 mg/dL Final    If the patient is fasting, the ADA then defines impaired fasting glucose as > 100 mg/dL and diabetes as > or equal to 123 mg/dL.    Calcium 02/12/2024 9.4  8.4 - 10.2 mg/dL Final    AST 02/12/2024 13  13 - 39 U/L Final    ALT 02/12/2024 9  7 - 52 U/L Final    Specimen collection should occur prior to Sulfasalazine administration due to the potential for falsely depressed results.     Alkaline Phosphatase 02/12/2024 91  34 - 104 U/L Final    Total Protein 02/12/2024 7.6  6.4 - 8.4 g/dL Final    Albumin 02/12/2024 4.4  3.5 - 5.0 g/dL Final    Total Bilirubin 02/12/2024 0.28  0.20 - 1.00 mg/dL Final    Use of this assay is not recommended for patients undergoing treatment with eltrombopag due to the potential for falsely elevated results.  N-acetyl-p-benzoquinone imine (metabolite of Acetaminophen) will generate erroneously low results in samples for patients that have taken an overdose of Acetaminophen.    eGFR 02/12/2024 94  ml/min/1.73sq m Final    HIV-1 p24 Antigen 02/12/2024 Non-Reactive  Non-Reactive Final    HIV-1 Antibody 02/12/2024 Non-Reactive  Non-Reactive Final    HIV-2 Antibody 02/12/2024 Non-Reactive  Non-Reactive Final    HIV Ag-Ab 5th Gen 02/12/2024 Non-Reactive  Non-Reactive  Final    A Non-Reactive test result does not preclude the possibility of exposure or infection with HIV-1 and/or HIV-2.  Non-Reactive results can occur if the quantity of marker present is below the detection limits or is not present during the stage of disease in which a sample is collected. Repeat testing should be considered where there is clinical suspicion of infection.            Abdulkadir Juan PA-C  Rheumatology

## 2024-04-27 DIAGNOSIS — G93.2 INTRACRANIAL HYPERTENSION: ICD-10-CM

## 2024-04-29 RX ORDER — ACETAZOLAMIDE 500 MG/1
500 CAPSULE, EXTENDED RELEASE ORAL 2 TIMES DAILY
Qty: 28 CAPSULE | Refills: 0 | Status: SHIPPED | OUTPATIENT
Start: 2024-04-29 | End: 2024-05-07 | Stop reason: SDUPTHER

## 2024-04-30 NOTE — TELEPHONE ENCOUNTER
Patient is being referred to a orthopedics. Please schedule accordingly.    [facility]   [phone]

## 2024-05-07 DIAGNOSIS — R20.2 PARESTHESIA AND PAIN OF BOTH UPPER EXTREMITIES: ICD-10-CM

## 2024-05-07 DIAGNOSIS — M79.602 PARESTHESIA AND PAIN OF BOTH UPPER EXTREMITIES: ICD-10-CM

## 2024-05-07 DIAGNOSIS — M79.601 PARESTHESIA AND PAIN OF BOTH UPPER EXTREMITIES: ICD-10-CM

## 2024-05-07 DIAGNOSIS — F33.3 MDD (MAJOR DEPRESSIVE DISORDER), RECURRENT, SEVERE, WITH PSYCHOSIS (HCC): Primary | ICD-10-CM

## 2024-05-07 DIAGNOSIS — G93.2 INTRACRANIAL HYPERTENSION: ICD-10-CM

## 2024-05-07 RX ORDER — GABAPENTIN 300 MG/1
300 CAPSULE ORAL 3 TIMES DAILY
Qty: 90 CAPSULE | Refills: 1 | Status: SHIPPED | OUTPATIENT
Start: 2024-05-07

## 2024-05-07 NOTE — TELEPHONE ENCOUNTER
Patient called requesting medication refill  For Gabapentin 300mgs one tablet TID.  Patient states she hasn't seen Neurologist yet until 5/15/2024.     Confirmed pharmacy [facility]

## 2024-05-07 NOTE — TELEPHONE ENCOUNTER
Patient is being referred to a orthopedics. Please schedule accordingly.    [facility]   [phone]

## 2024-05-08 ENCOUNTER — TELEPHONE (OUTPATIENT)
Age: 49
End: 2024-05-08

## 2024-05-08 RX ORDER — GABAPENTIN 300 MG/1
300 CAPSULE ORAL 3 TIMES DAILY
Qty: 90 CAPSULE | Refills: 0 | OUTPATIENT
Start: 2024-05-08

## 2024-05-08 RX ORDER — VENLAFAXINE HYDROCHLORIDE 75 MG/1
75 CAPSULE, EXTENDED RELEASE ORAL DAILY
Qty: 90 CAPSULE | Refills: 0 | Status: SHIPPED | OUTPATIENT
Start: 2024-05-08

## 2024-05-08 RX ORDER — ACETAZOLAMIDE 500 MG/1
500 CAPSULE, EXTENDED RELEASE ORAL 2 TIMES DAILY
Qty: 28 CAPSULE | Refills: 0 | Status: SHIPPED | OUTPATIENT
Start: 2024-05-08 | End: 2024-05-15 | Stop reason: SDUPTHER

## 2024-05-15 ENCOUNTER — CONSULT (OUTPATIENT)
Age: 49
End: 2024-05-15
Payer: COMMERCIAL

## 2024-05-15 VITALS
OXYGEN SATURATION: 99 % | WEIGHT: 175 LBS | HEART RATE: 81 BPM | DIASTOLIC BLOOD PRESSURE: 72 MMHG | HEIGHT: 61 IN | BODY MASS INDEX: 33.04 KG/M2 | SYSTOLIC BLOOD PRESSURE: 122 MMHG

## 2024-05-15 DIAGNOSIS — G93.2 INTRACRANIAL HYPERTENSION: ICD-10-CM

## 2024-05-15 DIAGNOSIS — R90.89 ABNORMAL FINDING ON MRI OF BRAIN: Primary | ICD-10-CM

## 2024-05-15 PROCEDURE — 99245 OFF/OP CONSLTJ NEW/EST HI 55: CPT | Performed by: PSYCHIATRY & NEUROLOGY

## 2024-05-15 RX ORDER — ACETAZOLAMIDE 500 MG/1
1000 CAPSULE, EXTENDED RELEASE ORAL 2 TIMES DAILY
Qty: 180 CAPSULE | Refills: 3 | Status: SHIPPED | OUTPATIENT
Start: 2024-05-15

## 2024-05-15 RX ORDER — RIZATRIPTAN BENZOATE 10 MG/1
10 TABLET ORAL AS NEEDED
Qty: 18 TABLET | Refills: 3 | Status: SHIPPED | OUTPATIENT
Start: 2024-05-15

## 2024-05-15 NOTE — PROGRESS NOTES
NEUROLOGY OUTPATIENT - NEW PATIENT VISIT NOTE        NAME: Zoila Guerrero  MRN: [id number]  : 1975     TODAY'S DATE: 05/15/24         HISTORY OF PRESENT ILLNESS (HPI):    Ms. Guerrero is a 48 y.o. female presenting with Intracranial hypertension      HEADACHE DESCRIPTION:    Onset of headaches: she started having gradual onset of headaches but for the last 6 months, they started to get worse.   Location of headaches: right-sided unilateral and occipital--top of the head  Radiation: Yes   Quality of the pain: sharp, shooting, and throbbing  Intensity of the headache: At present: 4/10;  At its worst:  10/10  Duration of the headaches:day(s)  Frequency of the headaches:about 3-4 days per week  Presence of aura:  Yes, pain in her eye  Associated symptoms with headaches: +nausea, +light sensitivity , and +sound sensitivity--some blurred vision. She feels that she needs to see an eye doctor.   Triggers:No   Positional component: N/A   Alleviating factors: Yes, hot shower and sitting down in the dark room it helps.    Any specific time of the day when get the headaches: no particular time of day    Family history of migraine headaches: No  History of neck and head trauma: No  Smoking status: Yes  Oral contraceptive use: No        Life style factors:  -Hydration: adequate  -Sleep: inadequate--on average she is sleeping 4-5 hours  -Caffeine intake: 2 cups of caffeinated coffee per day(s)  -Alcohol intake: none     Impact of headches:  -Number of headaches in past 30 days: 12-16/30 days.   -Number of days missed per month because of headache: she has left work earlier in the last 30 days.   -Number of ER visits for her headaches: none  in the last 30 days.     Treatment:  -Over the counter medications tried for headaches:   Tylenol , Ibuprofen , and Naproxen  ~ do not work for her    -Prescription medications:  Abortive or Rescue Medications used:   No rescue medicine tried in the past      Preventative or daily  "medications used for headaches:   Diamox 500 mg BID --helping in the beginning  Effexor 75 mg for her depression         Other significant co morbidities:  Depression      Red Flags for headache:  Age <5 or >50: No  First worst headaches: No  Maximal at intensity: No  Change in pattern: Yes  New headache in pregnancy, cancer or immunocompromised patient: No  Loss of consciousness or convulsions: No  Headache triggered by exertion, Valsalva maneuver or sexual activity: No  Abnormal exam: please see below in Neurological examination.        She started having gradual onset of balance complaints secondary to the right foot (unsteadiness) when she got the MRI of the brain. Causing pain in her lateral aspect of the food. She is also feeling that she is not able to walk in a straight line. No precipitating events reported, but she was having some headaches at that time. She feels that her symptoms are about the same. She does have numbness and tingling which usually starts in the middle of thigh. No RLS like symptoms but she feels her symptoms are worse at bed time. Some urinary hesitancy, IBS like symptoms and a few accidents.       CURRENT OUTPATIENT MEDICATIONS:    Zoila Guerrero has a current medication list which includes the following prescription(s): acetazolamide, diclofenac sodium, gabapentin, ibuprofen, omeprazole, venlafaxine, metformin, and senna.       PHYSICAL EXAMINATION:   VITAL SIGNS:  height is 5' 1\" (1.549 m) and weight is 79.4 kg (175 lb). Her blood pressure is 122/72 and her pulse is 81. Her oxygen saturation is 99%.        NEUROLOGICAL EXAMINATION:    MENTAL STATUS EXAM: Alert, oriented to time place and person,   CRANIAL NERVE EXAMINATION:  I Not tested   II Normal visual fields to finger counting.   II, Ill,  IV, VI Pupils were symmetric, briskly reactive. No afferent pupillary defect.  Eye movements are full without nystagmus. No ptosis.   V Facial sensation were intact   VII Facial movements " "were symmetrical    VIII Hearing was intact   IX, X Intact   XI Shoulder shrug and head turn was normal   XII Tongue protrusion is in the mid line.          MOTOR EXAM:        Arm Right  Left Leg Right  Left   Deltoid 5/5 5/5 lliopsoas 5/5 5/5   Biceps 5/5 5/5 Quads 5/5 5/5   Triceps 5/5 5/5 Hamstrings 5/5 5/5   Wrist Extension 5/5 5/5 Ankle Dorsi Flexion 5/5 5/5   Wrist Flexion 5/5 5/5 Ankle Plantar Flexion 5/5 5/5               DEEP TENDON REFLEXES:     Brachioradialis Biceps Triceps Patellar Achilles   Right 1+ 1+ 1+ 1+ 0   Left 1+ 1+ 1+ 1+ 0            SENSORY EXAMINATION:   Sensation to dull touch Intact  Sensation to temperature Intact    COORDINATION:   Normal finger to nose testing  Finger tapping test was normal    GAIT/STATION:   Normal--difficulty with tandem gait        DIAGNOSTIC STUDIES:   PERTINENT LABS:     Lab Results   Component Value Date    WBC 8.33 02/12/2024     Lab Results   Component Value Date    HGB 14.4 02/12/2024     Lab Results   Component Value Date     02/12/2024     Lab Results   Component Value Date    LYMPHSABS 2.70 02/12/2024     No results found for: \"LABLYMP\"  Lab Results   Component Value Date    EOSABS 0.10 02/12/2024     No components found for: \"NEUTROPHILS\"    No results found for: \"LABMONO\"         No results found for: \"LABGLUC\"  Lab Results   Component Value Date    CREATININE 0.74 02/12/2024     Lab Results   Component Value Date    AST 13 02/12/2024     Lab Results   Component Value Date    ALT 9 02/12/2024     Lab Results   Component Value Date    ALKPHOS 91 02/12/2024     Lab Results   Component Value Date    AST 13 02/12/2024        Lab Results   Component Value Date    FOLATE 12.8 02/20/2024     Lab Results   Component Value Date    KBPCORZO98 201 02/20/2024     No results found for: \"COPPER\"     Lab Results   Component Value Date    TSH 1.16 09/20/2019    HEPCAB Non-reactive 07/25/2023            NEUROIMAGING:  Results for orders placed during the hospital " encounter of 02/22/24    MRI brain wo contrast    Impression  Evidence of interval moderate symmetric enlargement of the superior ophthalmic veins since the 5/29/2022 CT neck. This is a nonspecific finding which could relate to elevated intracranial pressure and/or elevated venous pressure. Recommend follow-up MRI  orbits with and without gadolinium and MR angiography of the head with and without gadolinium for further assessment.    Mild disconjugate gaze/strabismus; evidence of exotropia on the left. Finding is similar to the 5/29/2022 CT neck.    Findings on this examination flagged as significant with follow-up recommendation on Epic.    Blade Drake M.D., FACR  Senior Member, American Society of Neuroradiology    Workstation performed: [id number]             ASSESSMENT AND PLAN:    Ms. Guerrero is a 48 y.o.female  presenting with headaches that are concerning for intracranial hypertension which are also supported by the MRI findings. Patient  has a past medical history of Bladder perforation, intraoperative, Depression, Intestine, perforation (HCC), and Sepsis (HCC).. Neurological exam is nonfocal. Neuroimaging including MRI of the brain was showing findings concerning for possible idiopathic intracranial hypertension versus elevated venous pressure.          Intracranial hypertension/Abnormal finding on MRI of brain  Due to her symptoms she was started on Diamox 500 mg twice a day which seems to have been helping with her headaches.  I discussed with the patient that her MRI findings are more consistent with idiopathic intracranial hypertension but I would recommend getting an MRV to check on ophthalmic vein.  I am also recommending that she should increase the dose of Diamox to 1000 mg twice a day.  I also discussed that the most accurate method to check for the intracranial pressure is with a spinal tap but the procedure is associated with certain complications including CSF leak leading to post spinal  headaches and sometimes these procedures require a blood patch.  As he has already started the Diamox I am not sure how accurate the reading of the CSF pressure would be.  I would however suggest that she should be seen by an ophthalmologist to confirm the findings  -For her migraine headaches she can take rizatriptan (Maxalt) 10 mg tablet; Take 1 tablet (10 mg total) by mouth as needed for migraine Take at the onset of migraine; if symptoms continue or return, may take another dose at least 2 hours after first dose. Take no more than 2 doses in a day.  Dispense: 18 tablet; Refill: 3  - Ambulatory referral to Physical Therapy; Future for her balance complaints  - MRV HEAD WO CONTRAST; Future       Return in about 3 months (around 8/15/2024), or Tele video.       The management plan was discussed in detail with the patient and all questions were answered.     Ms. Guerrero was encouraged to contact our office with any questions or concerns and to contact the clinic or go to the nearest emergency room if symptoms change or worsen.       I have spent a total of 62 min in reviewing and/or ordering tests, medications, or procedures, performing an examination or evaluation, reviewing pertinent history, counseling and educating the patient, referring and/or communicating with other health care professionals, documenting in the EMR and general coordination of care of Ms. Guerrero  today.           Luz Osborne MD.   Staff Neurologist,   Neuroimmunology and Neuroinfectious disease  05/15/24     This report has been created through the use of voice recognition/text compilation software.  Typographical and content errors may occur with this process.  While efforts are made to detect and correct such errors, in some cases errors will persist.  For this reason, wording in this document should be considered in the proper context and not strictly verbatim.  If, when reviewing the document, an error is discovered, please let  the office know at [phone]

## 2024-05-20 ENCOUNTER — HOSPITAL ENCOUNTER (OUTPATIENT)
Dept: MAMMOGRAPHY | Facility: CLINIC | Age: 49
Discharge: HOME | End: 2024-05-20
Payer: COMMERCIAL

## 2024-05-20 DIAGNOSIS — Z12.31 ENCOUNTER FOR SCREENING MAMMOGRAM FOR MALIGNANT NEOPLASM OF BREAST: ICD-10-CM

## 2024-05-20 PROCEDURE — 77067 SCR MAMMO BI INCL CAD: CPT

## 2024-05-20 PROCEDURE — 77063 BREAST TOMOSYNTHESIS BI: CPT

## 2024-06-06 ENCOUNTER — OFFICE VISIT (OUTPATIENT)
Dept: OBGYN CLINIC | Facility: CLINIC | Age: 49
End: 2024-06-06
Payer: COMMERCIAL

## 2024-06-06 VITALS
HEART RATE: 101 BPM | SYSTOLIC BLOOD PRESSURE: 129 MMHG | HEIGHT: 61 IN | DIASTOLIC BLOOD PRESSURE: 81 MMHG | BODY MASS INDEX: 33.04 KG/M2 | WEIGHT: 175 LBS

## 2024-06-06 DIAGNOSIS — M79.641 RIGHT HAND PAIN: Primary | ICD-10-CM

## 2024-06-06 DIAGNOSIS — M65.341 TRIGGER FINGER, RIGHT RING FINGER: ICD-10-CM

## 2024-06-06 DIAGNOSIS — M65.331 TRIGGER FINGER, RIGHT MIDDLE FINGER: ICD-10-CM

## 2024-06-06 DIAGNOSIS — M65.321 TRIGGER INDEX FINGER OF RIGHT HAND: ICD-10-CM

## 2024-06-06 DIAGNOSIS — M65.351 TRIGGER FINGER, RIGHT LITTLE FINGER: ICD-10-CM

## 2024-06-06 PROCEDURE — 20550 NJX 1 TENDON SHEATH/LIGAMENT: CPT | Performed by: PHYSICIAN ASSISTANT

## 2024-06-06 PROCEDURE — 99203 OFFICE O/P NEW LOW 30 MIN: CPT | Performed by: PHYSICIAN ASSISTANT

## 2024-06-06 RX ORDER — LIDOCAINE HYDROCHLORIDE 10 MG/ML
1 INJECTION, SOLUTION INFILTRATION; PERINEURAL
Status: COMPLETED | OUTPATIENT
Start: 2024-06-06 | End: 2024-06-06

## 2024-06-06 RX ORDER — TRIAMCINOLONE ACETONIDE 40 MG/ML
20 INJECTION, SUSPENSION INTRA-ARTICULAR; INTRAMUSCULAR
Status: COMPLETED | OUTPATIENT
Start: 2024-06-06 | End: 2024-06-06

## 2024-06-06 RX ADMIN — LIDOCAINE HYDROCHLORIDE 1 ML: 10 INJECTION, SOLUTION INFILTRATION; PERINEURAL at 16:00

## 2024-06-06 RX ADMIN — TRIAMCINOLONE ACETONIDE 20 MG: 40 INJECTION, SUSPENSION INTRA-ARTICULAR; INTRAMUSCULAR at 16:00

## 2024-06-06 NOTE — PROGRESS NOTES
Orthopaedic Surgery - Office Note  Zoila Guerrero (48 y.o. female)   : 1975   MRN: [id number]  Encounter Date: 2024    Chief Complaint   Patient presents with    Right Hand - Locking, Swelling, Pain, Numbness     4 fingers locking          Assessment/Plan  Diagnoses and all orders for this visit:    Right hand pain    Trigger index finger of right hand    Trigger finger, right middle finger    Trigger finger, right little finger    Trigger finger, right ring finger    The trigger finger diagnosis as well as treatment options were reviewed with the patient in the office today.  Patient was advised the best initial treatment would be to consider a cortisone injection.  If the 1st cortisone injection failed to relieve the symptoms, a 2nd injection could be considered in the future.  If symptoms persist beyond 2 attempted injections, recommendations for follow-up with the hand surgeon to discuss surgical release were reviewed.  All question and concerns were answered in the office today.    I discussed with the patient having 4 trigger fingers injected at that time may be discomforting.  Risks and benefits were reviewed at length.  Shared decision making was utilized and she wished to have the mall injected today.       Return if symptoms worsen or fail to improve for second injection.        History of Present Illness  This is a new patient with reported right hand trigger fingers.  She was referred to rheumatology and worked up on 2024 at which time she was noted to have multiple trigger fingers on the right hand.  The right middle finger has been triggering for the longest at about 7 months.  She has had progressive triggering involving the index ring and small finger.  She denies any triggering in the thumb.  She has had no treatment.  No trauma to the hands are reported.  She states she knows she has arthritis in the joints but the triggering and locking is different.  She was referred to  "orthopedics for evaluation and treatment of these trigger fingers.    She has a contributing medical history of fibromyalgia and chronic pain syndrome.  Rheumatologist has discharged her following up as needed.  Rheumatology also ordered bilateral lower extremity EMGs with a referral to neurology.    Review of Systems  Pertinent items are noted in HPI.  All other systems were reviewed and are negative.    Physical Exam  /81 (BP Location: Left arm, Patient Position: Sitting, Cuff Size: Standard)   Pulse 101   Ht 5' 1\" (1.549 m)   Wt 79.4 kg (175 lb)   BMI 33.07 kg/m²   Cons: Appears well.  No apparent distress.  Psych: Alert. Oriented x3.  Mood and affect normal.    Patient's right hand is without skin breakdown lesion or signs of infection.  She is tender to palpation at the A1 pulley involving the index middle ring and small fingers.  All 4 digits have a palpable nodule and active triggering in the office today.  She has an otherwise well-maintained range of motion of the MCP and IP joints.  She has no active triggering at the right thumb today in the office.  There is no Dupuytren's contractures.  She has no thenar atrophy or wasting.   strength and pinch strength is 5 out of 5.  She is neurovascular intact with normal capillary refill.  Distal radial ulnar pulses are +2.  She is nontender at the PIP and IP joints of the digits today in the office          Studies Reviewed      Hand/upper extremity injection: R index A1  Universal Protocol:  Consent: Verbal consent obtained.  Risks and benefits: risks, benefits and alternatives were discussed  Consent given by: patient  Time out: Immediately prior to procedure a \"time out\" was called to verify the correct patient, procedure, equipment, support staff and site/side marked as required.  Patient understanding: patient states understanding of the procedure being performed  Patient consent: the patient's understanding of the procedure matches consent " "given  Relevant documents: relevant documents present and verified  Test results: test results available and properly labeled  Site marked: the operative site was marked  Radiology Images displayed and confirmed. If images not available, report reviewed: imaging studies available  Patient identity confirmed: verbally with patient  Supporting Documentation  Indications: tendon swelling and pain   Procedure Details  Condition:trigger finger Location: index finger - R index A1   Needle size: 22 G  Ultrasound guidance: no  Medications administered: 1 mL lidocaine 1 %; 20 mg triamcinolone acetonide 40 mg/mL  Patient tolerance: patient tolerated the procedure well with no immediate complications  Dressing:  Sterile dressing applied       Hand/upper extremity injection: R long A1  Universal Protocol:  Consent: Verbal consent obtained.  Risks and benefits: risks, benefits and alternatives were discussed  Consent given by: patient  Time out: Immediately prior to procedure a \"time out\" was called to verify the correct patient, procedure, equipment, support staff and site/side marked as required.  Patient understanding: patient states understanding of the procedure being performed  Patient consent: the patient's understanding of the procedure matches consent given  Relevant documents: relevant documents present and verified  Test results: test results available and properly labeled  Site marked: the operative site was marked  Radiology Images displayed and confirmed. If images not available, report reviewed: imaging studies available  Patient identity confirmed: verbally with patient  Supporting Documentation  Indications: pain and tendon swelling   Procedure Details  Condition:trigger finger Location: long finger - R long A1   Preparation: Patient was prepped and draped in the usual sterile fashion  Needle size: 22 G  Ultrasound guidance: no  Medications administered: 1 mL lidocaine 1 %; 20 mg triamcinolone acetonide 40 " "mg/mL  Patient tolerance: patient tolerated the procedure well with no immediate complications  Dressing:  Sterile dressing applied       Hand/upper extremity injection: R ring A1  Universal Protocol:  Consent: Verbal consent obtained.  Risks and benefits: risks, benefits and alternatives were discussed  Consent given by: patient  Time out: Immediately prior to procedure a \"time out\" was called to verify the correct patient, procedure, equipment, support staff and site/side marked as required.  Patient understanding: patient states understanding of the procedure being performed  Patient consent: the patient's understanding of the procedure matches consent given  Relevant documents: relevant documents present and verified  Test results: test results available and properly labeled  Site marked: the operative site was marked  Radiology Images displayed and confirmed. If images not available, report reviewed: imaging studies available  Patient identity confirmed: verbally with patient  Supporting Documentation  Indications: pain and tendon swelling   Procedure Details  Condition:trigger finger Location: ring finger - R ring A1   Preparation: Patient was prepped and draped in the usual sterile fashion  Needle size: 22 G  Ultrasound guidance: no  Medications administered: 1 mL lidocaine 1 %; 20 mg triamcinolone acetonide 40 mg/mL  Patient tolerance: patient tolerated the procedure well with no immediate complications  Dressing:  Sterile dressing applied       Hand/upper extremity injection: R small A1  Universal Protocol:  Consent: Verbal consent obtained.  Risks and benefits: risks, benefits and alternatives were discussed  Consent given by: patient  Time out: Immediately prior to procedure a \"time out\" was called to verify the correct patient, procedure, equipment, support staff and site/side marked as required.  Patient understanding: patient states understanding of the procedure being performed  Patient consent: the " patient's understanding of the procedure matches consent given  Relevant documents: relevant documents present and verified  Test results: test results available and properly labeled  Site marked: the operative site was marked  Radiology Images displayed and confirmed. If images not available, report reviewed: imaging studies available  Patient identity confirmed: verbally with patient  Supporting Documentation  Indications: tendon swelling and pain   Procedure Details  Condition:trigger finger Location: small finger - R small A1   Preparation: Patient was prepped and draped in the usual sterile fashion  Needle size: 22 G  Ultrasound guidance: no  Medications administered: 1 mL lidocaine 1 %; 20 mg triamcinolone acetonide 40 mg/mL  Patient tolerance: patient tolerated the procedure well with no immediate complications  Dressing:  Sterile dressing applied         Medical, Surgical, Family, and Social History  The patient's medical history, family history, and social history, were reviewed and updated as appropriate.    Past Medical History:   Diagnosis Date    Bladder perforation, intraoperative     Depression     Intestine, perforation (HCC)     intra op    Sepsis (HCC)        Past Surgical History:   Procedure Laterality Date    ABDOMINAL SURGERY      BLADDER SURGERY       SECTION      COLONOSCOPY      CT NEEDLE BIOPSY RETROPERITONEUM  2020    HYSTERECTOMY  2015    INCISIONAL HERNIA REPAIR N/A 2021    Procedure: OPEN REPAIR VENTRAL/INCISIONAL HERNIA W/MESH;  Surgeon: Glendy Mcmahon MD;  Location: [facility] OR;  Service: General    OOPHORECTOMY Bilateral        Family History   Problem Relation Age of Onset    No Known Problems Mother     Pancreatic cancer Father     No Known Problems Sister     No Known Problems Sister     No Known Problems Sister     No Known Problems Daughter     Cervical cancer Maternal Grandmother     No Known Problems Paternal Grandmother     No Known Problems Maternal  Aunt     No Known Problems Maternal Aunt     No Known Problems Maternal Aunt     No Known Problems Maternal Aunt     No Known Problems Paternal Aunt     Pancreatic cancer Paternal Aunt     Breast cancer Neg Hx     Endometrial cancer Neg Hx     Ovarian cancer Neg Hx        Social History     Occupational History    Not on file   Tobacco Use    Smoking status: Every Day     Current packs/day: 0.25     Average packs/day: 0.3 packs/day for 36.4 years (9.1 ttl pk-yrs)     Types: Cigarettes     Start date: 1988    Smokeless tobacco: Never   Vaping Use    Vaping status: Never Used   Substance and Sexual Activity    Alcohol use: Not Currently    Drug use: Never    Sexual activity: Not on file       No Known Allergies      Current Outpatient Medications:     acetaZOLAMIDE (DIAMOX) 500 mg capsule, Take 2 capsules (1,000 mg total) by mouth 2 (two) times a day, Disp: 180 capsule, Rfl: 3    Diclofenac Sodium (VOLTAREN) 1 %, Apply 2 g topically 4 (four) times a day, Disp: 150 g, Rfl: 1    gabapentin (Neurontin) 300 mg capsule, Take 1 capsule (300 mg total) by mouth 3 (three) times a day, Disp: 90 capsule, Rfl: 1    ibuprofen (MOTRIN) 800 mg tablet, Take 1 tablet (800 mg total) by mouth 2 (two) times a day for 10 days, Disp: 20 tablet, Rfl: 0    metFORMIN (GLUCOPHAGE) 500 mg tablet, Take 500 mg by mouth 2 (two) times a day with meals (Patient not taking: Reported on 4/25/2024), Disp: , Rfl:     omeprazole (PriLOSEC) 40 MG capsule, take 1 capsule by mouth twice a day, Disp: 180 capsule, Rfl: 1    RA SENNA-LAX PO, take 1 tablet by mouth once daily (Patient not taking: Reported on 3/26/2024), Disp: , Rfl:     rizatriptan (Maxalt) 10 mg tablet, Take 1 tablet (10 mg total) by mouth as needed for migraine Take at the onset of migraine; if symptoms continue or return, may take another dose at least 2 hours after first dose. Take no more than 2 doses in a day., Disp: 18 tablet, Rfl: 3    venlafaxine (EFFEXOR-XR) 75 mg 24 hr capsule, Take  1 capsule (75 mg total) by mouth daily, Disp: 90 capsule, Rfl: 0      Terence Calzada PA-C

## 2024-06-09 ENCOUNTER — HOSPITAL ENCOUNTER (OUTPATIENT)
Dept: MRI IMAGING | Facility: HOSPITAL | Age: 49
Discharge: HOME | End: 2024-06-09
Attending: PSYCHIATRY & NEUROLOGY
Payer: COMMERCIAL

## 2024-06-09 DIAGNOSIS — R90.89 ABNORMAL FINDING ON MRI OF BRAIN: ICD-10-CM

## 2024-06-09 PROCEDURE — 70544 MR ANGIOGRAPHY HEAD W/O DYE: CPT

## 2024-06-17 ENCOUNTER — TELEPHONE (OUTPATIENT)
Dept: GASTROENTEROLOGY | Facility: CLINIC | Age: 49
End: 2024-06-17

## 2024-06-17 NOTE — TELEPHONE ENCOUNTER
Received a 2nd time a letter from [employer] asking for patients medical records.  Faxing to [facility].........

## 2024-07-12 DIAGNOSIS — M79.601 PARESTHESIA AND PAIN OF BOTH UPPER EXTREMITIES: ICD-10-CM

## 2024-07-12 DIAGNOSIS — M79.602 PARESTHESIA AND PAIN OF BOTH UPPER EXTREMITIES: ICD-10-CM

## 2024-07-12 DIAGNOSIS — R20.2 PARESTHESIA AND PAIN OF BOTH UPPER EXTREMITIES: ICD-10-CM

## 2024-07-12 RX ORDER — GABAPENTIN 300 MG/1
300 CAPSULE ORAL 3 TIMES DAILY
Qty: 90 CAPSULE | Refills: 5 | Status: SHIPPED | OUTPATIENT
Start: 2024-07-12

## 2024-07-13 ENCOUNTER — OFFICE VISIT (OUTPATIENT)
Dept: OBGYN CLINIC | Facility: CLINIC | Age: 49
End: 2024-07-13
Payer: COMMERCIAL

## 2024-07-13 VITALS
BODY MASS INDEX: 33.04 KG/M2 | DIASTOLIC BLOOD PRESSURE: 75 MMHG | HEIGHT: 61 IN | HEART RATE: 88 BPM | SYSTOLIC BLOOD PRESSURE: 110 MMHG | WEIGHT: 175 LBS

## 2024-07-13 DIAGNOSIS — M65.331 TRIGGER FINGER, RIGHT MIDDLE FINGER: ICD-10-CM

## 2024-07-13 DIAGNOSIS — M65.341 TRIGGER FINGER, RIGHT RING FINGER: ICD-10-CM

## 2024-07-13 DIAGNOSIS — M79.641 RIGHT HAND PAIN: Primary | ICD-10-CM

## 2024-07-13 PROCEDURE — 20550 NJX 1 TENDON SHEATH/LIGAMENT: CPT | Performed by: PHYSICIAN ASSISTANT

## 2024-07-13 PROCEDURE — 99213 OFFICE O/P EST LOW 20 MIN: CPT | Performed by: PHYSICIAN ASSISTANT

## 2024-07-13 RX ORDER — TRIAMCINOLONE ACETONIDE 40 MG/ML
20 INJECTION, SUSPENSION INTRA-ARTICULAR; INTRAMUSCULAR
Status: COMPLETED | OUTPATIENT
Start: 2024-07-13 | End: 2024-07-13

## 2024-07-13 RX ORDER — LIDOCAINE HYDROCHLORIDE 10 MG/ML
0.5 INJECTION, SOLUTION INFILTRATION; PERINEURAL
Status: COMPLETED | OUTPATIENT
Start: 2024-07-13 | End: 2024-07-13

## 2024-07-13 RX ADMIN — TRIAMCINOLONE ACETONIDE 20 MG: 40 INJECTION, SUSPENSION INTRA-ARTICULAR; INTRAMUSCULAR at 10:30

## 2024-07-13 RX ADMIN — LIDOCAINE HYDROCHLORIDE 0.5 ML: 10 INJECTION, SOLUTION INFILTRATION; PERINEURAL at 10:30

## 2024-07-13 NOTE — PROGRESS NOTES
"Orthopaedic Surgery - Office Note  Zoila Guerrero (48 y.o. female)   : 1975   MRN: [id number]  Encounter Date: 2024    Chief Complaint   Patient presents with    Right Hand - Follow-up         Assessment/Plan  Diagnoses and all orders for this visit:    Right hand pain    Trigger finger, right middle finger    Trigger finger, right ring finger    The diagnosis as well as treatment options were reviewed with the patient in the office today.  The risks and benefits of a second injection were reviewed.  Shared decision making was utilized and she elected to proceed with the injections in the right middle and ring finger.    She is aware if the second injection for the middle and ring finger do not resolve her symptoms I would recommend following up with a hand surgeon.    If symptoms return on her index or small finger she may return with myself to try a second injection.  All question concerns were answered in the office today.     Return if symptoms worsen or fail to improve.        History of Present Illness  This is a previous patient here for recheck of the right hand trigger fingers.  She was seen by myself on 2024.  At that time she had been referred for injection consideration by her rheumatologist.  She had had trigger fingers ongoing for 8 months.  She received injections in the right index middle long and small finger.  Patient reports the injection completely resolved the symptoms and her index and small finger.  She reports the symptoms dramatically improved in the long and ring finger but have been slowly returning.  She would like to try a second injection in those fingers.    Review of Systems  Pertinent items are noted in HPI.  All other systems were reviewed and are negative.    Physical Exam  /75 (BP Location: Right arm, Patient Position: Sitting, Cuff Size: Standard)   Pulse 88   Ht 5' 1\" (1.549 m)   Wt 79.4 kg (175 lb)   BMI 33.07 kg/m²   Cons: Appears well.  No " "apparent distress.  Psych: Alert. Oriented x3.  Mood and affect normal.    On examination patient's right middle and ring finger have a palpable nodule at the A1 pulley with active triggering in the office today.  There is mild tenderness.  She has full range of motion of all MCP and IP joints.  She no longer has any triggering at her previous finger sites.  She is neurovascular intact.  There are no signs of infection            Studies Reviewed  Previous orthopedic notes were reviewed by myself in the office today.      Hand/upper extremity injection: R long A1  Universal Protocol:  Consent: Verbal consent obtained.  Risks and benefits: risks, benefits and alternatives were discussed  Consent given by: patient  Time out: Immediately prior to procedure a \"time out\" was called to verify the correct patient, procedure, equipment, support staff and site/side marked as required.  Patient understanding: patient states understanding of the procedure being performed  Patient consent: the patient's understanding of the procedure matches consent given  Relevant documents: relevant documents present and verified  Test results: test results available and properly labeled  Site marked: the operative site was marked  Radiology Images displayed and confirmed. If images not available, report reviewed: imaging studies available  Patient identity confirmed: verbally with patient  Supporting Documentation  Indications: tendon swelling and pain   Procedure Details  Condition:trigger finger Location: long finger - R long A1   Preparation: Patient was prepped and draped in the usual sterile fashion  Needle size: 22 G  Medications administered: 0.5 mL lidocaine 1 %; 20 mg triamcinolone acetonide 40 mg/mL  Patient tolerance: patient tolerated the procedure well with no immediate complications  Dressing:  Sterile dressing applied       Hand/upper extremity injection: R ring A1  Universal Protocol:  Consent: Verbal consent obtained.  Risks " "and benefits: risks, benefits and alternatives were discussed  Consent given by: patient  Time out: Immediately prior to procedure a \"time out\" was called to verify the correct patient, procedure, equipment, support staff and site/side marked as required.  Patient understanding: patient states understanding of the procedure being performed  Patient consent: the patient's understanding of the procedure matches consent given  Relevant documents: relevant documents present and verified  Test results: test results available and properly labeled  Site marked: the operative site was marked  Radiology Images displayed and confirmed. If images not available, report reviewed: imaging studies available  Patient identity confirmed: verbally with patient  Supporting Documentation  Indications: tendon swelling and pain   Procedure Details  Condition:trigger finger Location: ring finger - R ring A1   Preparation: Patient was prepped and draped in the usual sterile fashion  Needle size: 22 G  Ultrasound guidance: no  Medications administered: 0.5 mL lidocaine 1 %; 20 mg triamcinolone acetonide 40 mg/mL  Patient tolerance: patient tolerated the procedure well with no immediate complications  Dressing:  Sterile dressing applied           Medical, Surgical, Family, and Social History  The patient's medical history, family history, and social history, were reviewed and updated as appropriate.    Past Medical History:   Diagnosis Date    Bladder perforation, intraoperative     Depression     Intestine, perforation (HCC)     intra op    Sepsis (HCC)        Past Surgical History:   Procedure Laterality Date    ABDOMINAL SURGERY      BLADDER SURGERY       SECTION      COLONOSCOPY      CT NEEDLE BIOPSY RETROPERITONEUM  2020    HYSTERECTOMY  2015    INCISIONAL HERNIA REPAIR N/A 2021    Procedure: OPEN REPAIR VENTRAL/INCISIONAL HERNIA W/MESH;  Surgeon: Glendy Mcmahon MD;  Location: [facility] OR;  Service: General    " OOPHORECTOMY Bilateral 2015       Family History   Problem Relation Age of Onset    No Known Problems Mother     Pancreatic cancer Father     No Known Problems Sister     No Known Problems Sister     No Known Problems Sister     No Known Problems Daughter     Cervical cancer Maternal Grandmother     No Known Problems Paternal Grandmother     No Known Problems Maternal Aunt     No Known Problems Maternal Aunt     No Known Problems Maternal Aunt     No Known Problems Maternal Aunt     No Known Problems Paternal Aunt     Pancreatic cancer Paternal Aunt     Breast cancer Neg Hx     Endometrial cancer Neg Hx     Ovarian cancer Neg Hx        Social History     Occupational History    Not on file   Tobacco Use    Smoking status: Every Day     Current packs/day: 0.25     Average packs/day: 0.3 packs/day for 36.5 years (9.1 ttl pk-yrs)     Types: Cigarettes     Start date: 1988    Smokeless tobacco: Never   Vaping Use    Vaping status: Never Used   Substance and Sexual Activity    Alcohol use: Not Currently    Drug use: Never    Sexual activity: Not on file       No Known Allergies      Current Outpatient Medications:     acetaZOLAMIDE (DIAMOX) 500 mg capsule, Take 2 capsules (1,000 mg total) by mouth 2 (two) times a day, Disp: 180 capsule, Rfl: 3    Diclofenac Sodium (VOLTAREN) 1 %, Apply 2 g topically 4 (four) times a day, Disp: 150 g, Rfl: 1    gabapentin (Neurontin) 300 mg capsule, Take 1 capsule (300 mg total) by mouth 3 (three) times a day, Disp: 90 capsule, Rfl: 5    ibuprofen (MOTRIN) 800 mg tablet, Take 1 tablet (800 mg total) by mouth 2 (two) times a day for 10 days, Disp: 20 tablet, Rfl: 0    metFORMIN (GLUCOPHAGE) 500 mg tablet, Take 500 mg by mouth 2 (two) times a day with meals (Patient not taking: Reported on 4/25/2024), Disp: , Rfl:     omeprazole (PriLOSEC) 40 MG capsule, take 1 capsule by mouth twice a day, Disp: 180 capsule, Rfl: 1    RA SENNA-LAX PO, take 1 tablet by mouth once daily (Patient not taking:  Reported on 3/26/2024), Disp: , Rfl:     rizatriptan (Maxalt) 10 mg tablet, Take 1 tablet (10 mg total) by mouth as needed for migraine Take at the onset of migraine; if symptoms continue or return, may take another dose at least 2 hours after first dose. Take no more than 2 doses in a day., Disp: 18 tablet, Rfl: 3    venlafaxine (EFFEXOR-XR) 75 mg 24 hr capsule, Take 1 capsule (75 mg total) by mouth daily, Disp: 90 capsule, Rfl: 0      Terence Calzada PA-C

## 2024-07-13 NOTE — PATIENT INSTRUCTIONS
Patient Education     Trigger Finger   About this topic   Trigger finger is a problem with straightening your finger or thumb. It seems as if your finger is stuck in a bent position. It may snap when it is straightened. Your fingers are made up of many small bones. Muscles help move your fingers and the muscles are attached to the bones with strong bands of tissue called tendons. If the area around the tendon becomes swollen, the tendon is not able to move as well as it should. Then, your finger may become stiff and have problems moving.     What are the causes?   The small space around the tendon becomes swollen. The tendon may also have a bump on it. Then, the tendon cannot move smoothly to bend and straighten your finger.  What can make this more likely to happen?   You are more likely to have this problem if you:  Have illnesses like rheumatoid arthritis and diabetes  Are female  Are older than 45 years of age  What are the main signs?   Pain at the base of the finger  Stiffness in the joints of the finger  Popping or clicking when you move your finger  Finger catches or locks in a bent position  How does the doctor diagnose this health problem?   Your doctor will do an exam and look at your finger and hand. Your doctor may have you try to move your fingers to check your motion. Your doctor may push on your fingers to test your strength. Your doctor will also check for numbness and blood flow.  How does the doctor treat this health problem?   Based on the problem, your doctor may suggest:  Rest and avoid activities that make your problem worse  Splint or brace  A shot of an anti-inflammatory drug called a corticosteroid. This will help with swelling. Talk with your doctor about the risks of this shot.  Finger stretching exercises to help with stiffness in your finger  Surgery  What drugs may be needed?   The doctor may order drugs to:  Help with pain and swelling, like ibuprofen (Advil, Motrin). These are  nonsteroidal anti-inflammatory drugs (NSAIDS).  Help with pain, such as acetaminophen (Tylenol)  What problems could happen?   Loss of finger movement or strength  Ongoing pain or stiffness  Long-term disability  Injury to nerves, blood vessels, or other tissues  Develop trigger finger in other fingers  What can be done to prevent this health problem?   Take rests often when doing something with repeat hand motions.  Last Reviewed Date   2021-03-16  Consumer Information Use and Disclaimer   This generalized information is a limited summary of diagnosis, treatment, and/or medication information. It is not meant to be comprehensive and should be used as a tool to help the user understand and/or assess potential diagnostic and treatment options. It does NOT include all information about conditions, treatments, medications, side effects, or risks that may apply to a specific patient. It is not intended to be medical advice or a substitute for the medical advice, diagnosis, or treatment of a health care provider based on the health care provider's examination and assessment of a patient’s specific and unique circumstances. Patients must speak with a health care provider for complete information about their health, medical questions, and treatment options, including any risks or benefits regarding use of medications. This information does not endorse any treatments or medications as safe, effective, or approved for treating a specific patient. UpTo[employer], Inc[employer] and its affiliates disclaim any warranty or liability relating to this information or the use thereof. The use of this information is governed by the Terms of Use, available at https://www.[facility].com/en/know/clinical-effectiveness-terms   Copyright   Copyright © 2024 [employer]ToDate, Inc. and its affiliates and/or licensors. All rights reserved.

## 2024-08-13 ENCOUNTER — TELEPHONE (OUTPATIENT)
Dept: NEUROLOGY | Facility: CLINIC | Age: 49
End: 2024-08-13

## 2024-08-13 NOTE — TELEPHONE ENCOUNTER
"Tried calling pt to confirm appointment on 8/21/24 with  at [place] location. Unable to leave message 'mailbox full\",Sent pt a [employer] message to confirm appointment.  "

## 2024-08-13 NOTE — TELEPHONE ENCOUNTER
8/13 LM ?? NEW INSUR - REQUESTED CALL BACK (8/13 PER MYRIAM ABDULLAHI @ [facility] - INSUR TERMED 8/2/24)

## 2024-08-21 ENCOUNTER — TELEMEDICINE (OUTPATIENT)
Age: 49
End: 2024-08-21
Payer: COMMERCIAL

## 2024-08-21 ENCOUNTER — TELEPHONE (OUTPATIENT)
Age: 49
End: 2024-08-21

## 2024-08-21 VITALS — BODY MASS INDEX: 33.04 KG/M2 | WEIGHT: 175 LBS | HEIGHT: 61 IN

## 2024-08-21 DIAGNOSIS — G43.009 MIGRAINE WITHOUT AURA AND WITHOUT STATUS MIGRAINOSUS, NOT INTRACTABLE: Primary | ICD-10-CM

## 2024-08-21 DIAGNOSIS — R90.89 ABNORMAL FINDING ON MRI OF BRAIN: ICD-10-CM

## 2024-08-21 PROCEDURE — 99215 OFFICE O/P EST HI 40 MIN: CPT | Performed by: PSYCHIATRY & NEUROLOGY

## 2024-08-21 RX ORDER — SUMATRIPTAN 50 MG/1
50 TABLET, FILM COATED ORAL AS NEEDED
Qty: 9 TABLET | Refills: 2 | Status: SHIPPED | OUTPATIENT
Start: 2024-08-21

## 2024-08-21 RX ORDER — AMITRIPTYLINE HYDROCHLORIDE 10 MG/1
TABLET ORAL
Qty: 90 TABLET | Refills: 3 | Status: SHIPPED | OUTPATIENT
Start: 2024-08-21

## 2024-08-21 NOTE — TELEPHONE ENCOUNTER
PT called in to schedule appt regarding some concerns that she has.     Also, she believes she is due for some vaccines, being that she is now 48 yrs old.     PT scheduled for: 08/22 @ 8:40 with April    For any further information or questions, please call pt. Thank you!    Zoila Guerrero   [phone]

## 2024-08-21 NOTE — PATIENT INSTRUCTIONS
Weeks 1: Decrease Diamox to 500 mg twice a day  Week 2: Decrease Diamox 500 mg once a day  Week 3: Stop.

## 2024-08-21 NOTE — PROGRESS NOTES
NEUROLOGY OUTPATIENT - TELEMEDICINE FOLLOW UP PATIENT VISIT NOTE        NAME: Zoila Guerrero  MRN: [id number]  : 1975     TODAY'S DATE: 24       The patient was identified by name and date of birth.   Ms. Guerrero  was informed that this is a telemedicine visit and that the visit is being conducted through the Epic Embedded platform.  Ms. Guerrero    agrees to proceed..  My office door was closed. No one else was in the room.   Ms. Guerrero   acknowledged consent and understanding of privacy and security of the video platform.  Ms. Guerrero   agreed to participate and understands they can discontinue the visit at any time.     Verification of patient location:Patient is located at home in the following state in which I hold an active license; [place].     Patient is aware this is a billable service.     HISTORY OF PRESENT ILLNESS (HPI):    Ms. Guerrero is a 48 y.o. female presenting with abnormal mri       INTERIM HISTORY:  Frequency of headaches days : A couple of times a week   Intensity of the headaches days: little less as compared to before.     Trigger: light    Medicine for headaches: Diamox (1000 mg BID) and Maxalt (unsure if it is helping).     Side effects from the medications. none    Using Effexor for anxiety (she has stopped using it as it was not effective)  She is also using the Gabapentin 300 mg TID for her underlying Fibromyalgia       Any imaging including CTH or MRI of the brain: --MRI brain was showing moderate symmetric enlargement of the superior ophthalmic veins since the 2022 CT neck. This is a nonspecific finding which could relate to elevated intracranial pressure and/or elevated venous pressure for which MRV was ordered which was negative. (2024)      PRIOR NOTES:    HEADACHE DESCRIPTION:    Onset of headaches: she started having gradual onset of headaches but for the last 6 months, they started to get worse.   Location of headaches: right-sided unilateral and  occipital--top of the head  Radiation: Yes   Quality of the pain: sharp, shooting, and throbbing  Intensity of the headache: At present: 4/10;  At its worst:  10/10  Duration of the headaches:day(s)  Frequency of the headaches:about 3-4 days per week  Presence of aura:  Yes, pain in her eye  Associated symptoms with headaches: +nausea, +light sensitivity , and +sound sensitivity--some blurred vision. She feels that she needs to see an eye doctor.   Triggers:No   Positional component: N/A   Alleviating factors: Yes, hot shower and sitting down in the dark room it helps.    Any specific time of the day when get the headaches: no particular time of day    Family history of migraine headaches: No  History of neck and head trauma: No  Smoking status: Yes  Oral contraceptive use: No        Life style factors:  -Hydration: adequate  -Sleep: inadequate--on average she is sleeping 4-5 hours  -Caffeine intake: 2 cups of caffeinated coffee per day(s)  -Alcohol intake: none     Impact of headches:  -Number of headaches in past 30 days: 12-16/30 days.   -Number of days missed per month because of headache: she has left work earlier in the last 30 days.   -Number of ER visits for her headaches: none  in the last 30 days.     Treatment:  -Over the counter medications tried for headaches:   Tylenol , Ibuprofen , and Naproxen  ~ do not work for her    -Prescription medications:  Abortive or Rescue Medications used:   No rescue medicine tried in the past      Preventative or daily medications used for headaches:   Diamox 500 mg BID --helping in the beginning  Effexor 75 mg for her depression         Other significant co morbidities:  Depression      Red Flags for headache:  Age <5 or >50: No  First worst headaches: No  Maximal at intensity: No  Change in pattern: Yes  New headache in pregnancy, cancer or immunocompromised patient: No  Loss of consciousness or convulsions: No  Headache triggered by exertion, Valsalva maneuver or  "sexual activity: No  Abnormal exam: please see below in Neurological examination.        She started having gradual onset of balance complaints secondary to the right foot (unsteadiness) when she got the MRI of the brain. Causing pain in her lateral aspect of the food. She is also feeling that she is not able to walk in a straight line. No precipitating events reported, but she was having some headaches at that time. She feels that her symptoms are about the same. She does have numbness and tingling which usually starts in the middle of thigh. No RLS like symptoms but she feels her symptoms are worse at bed time. Some urinary hesitancy, IBS like symptoms and a few accidents.       CURRENT OUTPATIENT MEDICATIONS:    Zoila Guerrero has a current medication list which includes the following prescription(s): acetazolamide, diclofenac sodium, gabapentin, ibuprofen, metformin, omeprazole, senna, rizatriptan, and venlafaxine.       PHYSICAL EXAMINATION:   VITAL SIGNS:  height is 5' 1\" (1.549 m) and weight is 79.4 kg (175 lb).        NEUROLOGICAL EXAMINATION:    MENTAL STATUS EXAM: Alert, oriented to time place and person,   Unable to assess as this is a telemedicine visit       DIAGNOSTIC STUDIES:   PERTINENT LABS:     Lab Results   Component Value Date    WBC 8.33 02/12/2024     Lab Results   Component Value Date    HGB 14.4 02/12/2024     Lab Results   Component Value Date     02/12/2024     Lab Results   Component Value Date    LYMPHSABS 2.70 02/12/2024     No results found for: \"LABLYMP\"  Lab Results   Component Value Date    EOSABS 0.10 02/12/2024     No components found for: \"NEUTROPHILS\"    No results found for: \"LABMONO\"         No results found for: \"LABGLUC\"  Lab Results   Component Value Date    CREATININE 0.74 02/12/2024     Lab Results   Component Value Date    AST 13 02/12/2024     Lab Results   Component Value Date    ALT 9 02/12/2024     Lab Results   Component Value Date    ALKPHOS 91 02/12/2024 " "    Lab Results   Component Value Date    AST 13 02/12/2024        Lab Results   Component Value Date    FOLATE 12.8 02/20/2024     Lab Results   Component Value Date    YRFUDDEV43 201 02/20/2024     No results found for: \"COPPER\"     Lab Results   Component Value Date    TSH 1.16 09/20/2019    HEPCAB Non-reactive 07/25/2023            NEUROIMAGING:  Results for orders placed during the hospital encounter of 02/22/24    MRI brain wo contrast    Impression  Evidence of interval moderate symmetric enlargement of the superior ophthalmic veins since the 5/29/2022 CT neck. This is a nonspecific finding which could relate to elevated intracranial pressure and/or elevated venous pressure. Recommend follow-up MRI  orbits with and without gadolinium and MR angiography of the head with and without gadolinium for further assessment.    Mild disconjugate gaze/strabismus; evidence of exotropia on the left. Finding is similar to the 5/29/2022 CT neck.    Findings on this examination flagged as significant with follow-up recommendation on Epic.    Blade Drake M.D., FACR  Senior Member, American Society of Neuroradiology    Workstation performed: [id number]             ASSESSMENT AND PLAN:    Ms. Guerrero is a 48 y.o.female  presenting with headaches that are concerning for intracranial hypertension which are also supported by the MRI findings. Patient  has a past medical history of Bladder perforation, intraoperative, Depression, Intestine, perforation (HCC), and Sepsis ([facility]).. Neurological exam is nonfocal. Neuroimaging including MRI of the brain was showing findings concerning for possible idiopathic intracranial hypertension versus elevated venous pressure but the MRV did not show any acute findings.  Since increasing the dose of Diamox patient denies having any relief from the headaches and I feel that these headaches are likely related with her migraine headaches versus possible occipital neuralgia due to which I would be " switching her medications from Diamox to amitriptyline.  I have also recommended that she should switch her Maxalt to Imitrex        The primary encounter diagnosis was Migraine without aura and without status migrainosus, not intractable. A diagnosis of Abnormal finding on MRI of brain was also pertinent to this visit.    Decrease the Diamox as follows  Weeks 1: Decrease Diamox to 500 mg twice a day  Week 2: Decrease Diamox 500 mg once a day  Week 3: Stop.        Start taking amitriptyline 10 mg and if no relief to her symptoms she can increase the dose to 20 mg in about a week's time  Stop using Maxalt and start taking Imitrex 50 mg and as needed basis at the start of the headache and if you are still having the headache you can repeat it in 2 hours but no more than 3 medications in 24 hours    If her headaches do not improve we might consider doing a nerve block or adding in a muscle relaxer     Return in about 5 months (around 1/21/2025).       The management plan was discussed in detail with the patient and all questions were answered.     Ms. Guerrero was encouraged to contact our office with any questions or concerns and to contact the clinic or go to the nearest emergency room if symptoms change or worsen.       I have spent a total of 41 min in reviewing and/or ordering tests, medications, or procedures, performing an examination or evaluation, reviewing pertinent history, counseling and educating the patient, referring and/or communicating with other health care professionals, documenting in the EMR and general coordination of care of Ms. Guerrero  today.           Luz Osborne MD.   Staff Neurologist,   Neuroimmunology and Neuroinfectious disease  08/21/24     This report has been created through the use of voice recognition/text compilation software.  Typographical and content errors may occur with this process.  While efforts are made to detect and correct such errors, in some cases errors will  persist.  For this reason, wording in this document should be considered in the proper context and not strictly verbatim.  If, when reviewing the document, an error is discovered, please let the office know at [phone]

## 2024-08-22 ENCOUNTER — OFFICE VISIT (OUTPATIENT)
Age: 49
End: 2024-08-22
Payer: COMMERCIAL

## 2024-08-22 VITALS
DIASTOLIC BLOOD PRESSURE: 70 MMHG | OXYGEN SATURATION: 98 % | RESPIRATION RATE: 14 BRPM | SYSTOLIC BLOOD PRESSURE: 116 MMHG | BODY MASS INDEX: 30.29 KG/M2 | HEIGHT: 61 IN | TEMPERATURE: 97.5 F | HEART RATE: 78 BPM | WEIGHT: 160.4 LBS

## 2024-08-22 DIAGNOSIS — Z23 ENCOUNTER FOR IMMUNIZATION: ICD-10-CM

## 2024-08-22 DIAGNOSIS — F17.200 TOBACCO DEPENDENCE: ICD-10-CM

## 2024-08-22 DIAGNOSIS — M79.7 FIBROMYALGIA: ICD-10-CM

## 2024-08-22 DIAGNOSIS — Z13.6 SCREENING FOR CARDIOVASCULAR CONDITION: ICD-10-CM

## 2024-08-22 DIAGNOSIS — Z00.00 ANNUAL PHYSICAL EXAM: Primary | ICD-10-CM

## 2024-08-22 DIAGNOSIS — B00.1 COLD SORE: ICD-10-CM

## 2024-08-22 DIAGNOSIS — E55.9 VITAMIN D DEFICIENCY: ICD-10-CM

## 2024-08-22 PROCEDURE — 99406 BEHAV CHNG SMOKING 3-10 MIN: CPT

## 2024-08-22 PROCEDURE — 90677 PCV20 VACCINE IM: CPT

## 2024-08-22 PROCEDURE — 99396 PREV VISIT EST AGE 40-64: CPT

## 2024-08-22 PROCEDURE — 99214 OFFICE O/P EST MOD 30 MIN: CPT

## 2024-08-22 PROCEDURE — 90471 IMMUNIZATION ADMIN: CPT

## 2024-08-22 RX ORDER — NICOTINE 21 MG/24HR
1 PATCH, TRANSDERMAL 24 HOURS TRANSDERMAL EVERY 24 HOURS
Qty: 28 PATCH | Refills: 0 | Status: SHIPPED | OUTPATIENT
Start: 2024-08-22

## 2024-08-22 RX ORDER — VALACYCLOVIR HYDROCHLORIDE 1 G/1
2000 TABLET, FILM COATED ORAL 2 TIMES DAILY
Qty: 4 TABLET | Refills: 0 | Status: SHIPPED | OUTPATIENT
Start: 2024-08-22 | End: 2024-08-23

## 2024-08-22 NOTE — PROGRESS NOTES
Adult Annual Physical  Name: Zoila Guerrero      : 1975      MRN: [id number]  Encounter Provider: Kate Ng PA-C  Encounter Date: 2024   Encounter department: [facility] [place]    Assessment & Plan   1. Annual physical exam  2. Cold sore  Comments:  Take Valtrex 2000 mg twice a day for 1 day.  Patient will follow-up when she has another outbreak to start sooner.  Orders:  -     valACYclovir (VALTREX) 1,000 mg tablet; Take 2 tablets (2,000 mg total) by mouth 2 (two) times a day for 1 day  3. Fibromyalgia  Assessment & Plan:  Well-controlled with gabapentin.  4. Tobacco dependence  Comments:  Discussed different options, patient elects to go on patches and use the gum for as needed cravings. Choose a day to stop, taper down to 7 mg in 1 month.  Orders:  -     nicotine (NICODERM CQ) 14 mg/24hr TD 24 hr patch; Place 1 patch on the skin over 24 hours every 24 hours  -     nicotine polacrilex (Nicorette) 2 mg gum; Chew 1 each (2 mg total) as needed for smoking cessation  5. Screening for cardiovascular condition  Comments:  Will check a lipid panel when she is fasting.  Orders:  -     Lipid panel; Future  6. Vitamin D deficiency  Assessment & Plan:  Not on supplementation, will check a vitamin D level.  Orders:  -     Vitamin D 25 hydroxy; Future  7. Encounter for immunization  Comments:  Pneumonia vaccine given today, she will get shingles vaccine at the pharmacy.  Orders:  -     Pneumococcal Conjugate Vaccine 20-valent (Pcv20)  Immunizations and preventive care screenings were discussed with patient today. Appropriate education was printed on patient's after visit summary.    Counseling:  Alcohol/drug use: discussed moderation in alcohol intake, the recommendations for healthy alcohol use, and avoidance of illicit drug use.  Dental Health: discussed importance of regular tooth brushing, flossing, and dental visits.  Injury prevention: discussed safety/seat belts, safety  helmets, smoke detectors, carbon dioxide detectors, and smoking near bedding or upholstery.  Sexual health: discussed sexually transmitted diseases, partner selection, use of condoms, avoidance of unintended pregnancy, and contraceptive alternatives.  Exercise: the importance of regular exercise/physical activity was discussed. Recommend exercise 3-5 times per week for at least 30 minutes.       Depression Screening and Follow-up Plan: Patient was screened for depression during today's encounter. They screened negative with a PHQ-9 score of 0.    Tobacco Cessation Counseling: Tobacco cessation counseling was provided. The patient is sincerely urged to quit consumption of tobacco. She is ready to quit tobacco. Medication options and side effects of medication discussed. Patient agreed to medication. Nicotine patch was prescribed.         History of Present Illness     Adult Annual Physical:  Patient presents for annual physical.     Diet and Physical Activity:  - Diet/Nutrition: poor diet.  - Exercise: no formal exercise.    General Health:  - Sleep: sleeps well.  - Hearing: normal hearing bilateral ears.  - Vision: no vision problems.  - Dental: regular dental visits.    /GYN Health:  - Follows with GYN: yes.   - Menopause: postmenopausal.   - History of STDs: no  - Contraception: menopause.      Advanced Care Planning:  - Has an advanced directive?: no    - Has a durable medical POA?: no    - ACP document given to patient?: no        Patient is coming in, patient of Dr. Hartmann, for discussion of health concerns and vaccines as well as annual physical.    Recently turned 48, has not had shingles vaccines.  Advised her to get the shingles vaccine.      Patient is also a smoker, is interested in patches.    Patient sees neurology for chronic headaches.  On Diamox 1000 mg twice daily as well as Maxalt as needed.  MRI findings are concerning for intracranial hypertension.  Nonfocal neurologic exam.  She is not getting  "relief with the Diamox and was switched to amitriptyline and the Maxalt was switched to Imitrex.  If this does not work, they are considering a nerve block or adding a muscle relaxer.  Pt was on amitriptyline years ago and gained weight, so she is concerned about being on the medication.      Pt has a cold sore on her lower right lip.  She has frequent outbreaks.  Has never been on antivirals for this.    Review of Systems   Constitutional: Negative.    HENT: Negative.     Respiratory:  Negative for cough and shortness of breath.    Cardiovascular:  Negative for chest pain.   Gastrointestinal: Negative.    Genitourinary: Negative.    Musculoskeletal:  Negative for gait problem.   Skin:  Negative for rash.        Cold sore of the right lower lip.   Neurological:  Negative for syncope, light-headedness and headaches.   Psychiatric/Behavioral: Negative.     All other systems reviewed and are negative.        Objective     /70 (BP Location: Left arm, Patient Position: Sitting, Cuff Size: Standard)   Pulse 78   Temp 97.5 °F (36.4 °C) (Tympanic)   Resp 14   Ht 5' 1\" (1.549 m)   Wt 72.8 kg (160 lb 6.4 oz)   SpO2 98%   BMI 30.31 kg/m²     Physical Exam  Vitals and nursing note reviewed.   Constitutional:       General: She is not in acute distress.     Appearance: Normal appearance. She is not toxic-appearing.   HENT:      Head: Normocephalic and atraumatic.      Jaw: There is normal jaw occlusion.      Right Ear: Hearing, tympanic membrane, ear canal and external ear normal.      Left Ear: Hearing, tympanic membrane, ear canal and external ear normal.      Nose: Nose normal.      Mouth/Throat:      Lips: Pink.      Mouth: Mucous membranes are moist. No oral lesions.      Tongue: No lesions.      Palate: No mass.      Pharynx: Oropharynx is clear. Uvula midline.        Comments: Patient is a cold sore in the right lower lip.  No oral lesions.  Eyes:      General: Lids are normal. Vision grossly intact.      " Conjunctiva/sclera: Conjunctivae normal.      Pupils: Pupils are equal, round, and reactive to light.   Neck:      Thyroid: No thyroid mass, thyromegaly or thyroid tenderness.      Vascular: No carotid bruit.   Cardiovascular:      Rate and Rhythm: Normal rate and regular rhythm.      Pulses:           Radial pulses are 2+ on the right side and 2+ on the left side.        Dorsalis pedis pulses are 2+ on the right side and 2+ on the left side.      Heart sounds: Normal heart sounds. No murmur heard.  Pulmonary:      Effort: Pulmonary effort is normal. No respiratory distress.      Breath sounds: Normal breath sounds.   Abdominal:      General: Abdomen is flat. Bowel sounds are normal.      Palpations: Abdomen is soft.      Tenderness: There is no abdominal tenderness.   Musculoskeletal:         General: No tenderness, deformity or signs of injury.      Cervical back: Full passive range of motion without pain.      Right lower leg: No edema.      Left lower leg: No edema.   Lymphadenopathy:      Cervical: No cervical adenopathy.   Skin:     General: Skin is warm and dry.      Coloration: Skin is not jaundiced.   Neurological:      Mental Status: She is alert and oriented to person, place, and time.      GCS: GCS eye subscore is 4. GCS verbal subscore is 5. GCS motor subscore is 6.      Gait: Gait is intact.   Psychiatric:         Behavior: Behavior is cooperative.

## 2024-08-22 NOTE — PATIENT INSTRUCTIONS
"Patient Education     Routine physical for adults   The Basics   Written by the doctors and editors at [facility]   What is a physical? -- A physical is a routine visit, or \"check-up,\" with your doctor. You might also hear it called a \"wellness visit\" or \"preventive visit.\"  During each visit, the doctor will:   Ask about your physical and mental health   Ask about your habits, behaviors, and lifestyle   Do an exam   Give you vaccines if needed   Talk to you about any medicines you take   Give advice about your health   Answer your questions  Getting regular check-ups is an important part of taking care of your health. It can help your doctor find and treat any problems you have. But it's also important for preventing health problems.  A routine physical is different from a \"sick visit.\" A sick visit is when you see a doctor because of a health concern or problem. Since physicals are scheduled ahead of time, you can think about what you want to ask the doctor.  How often should I get a physical? -- It depends on your age and health. In general, for people age 21 years and older:   If you are younger than 50 years, you might be able to get a physical every 3 years.   If you are 50 years or older, your doctor might recommend a physical every year.  If you have an ongoing health condition, like diabetes or high blood pressure, your doctor will probably want to see you more often.  What happens during a physical? -- In general, each visit will include:   Physical exam - The doctor or nurse will check your height, weight, heart rate, and blood pressure. They will also look at your eyes and ears. They will ask about how you are feeling and whether you have any symptoms that bother you.   Medicines - It's a good idea to bring a list of all the medicines you take to each doctor visit. Your doctor will talk to you about your medicines and answer any questions. Tell them if you are having any side effects that bother you. You " "should also tell them if you are having trouble paying for any of your medicines.   Habits and behaviors - This includes:   Your diet   Your exercise habits   Whether you smoke, drink alcohol, or use drugs   Whether you are sexually active   Whether you feel safe at home  Your doctor will talk to you about things you can do to improve your health and lower your risk of health problems. They will also offer help and support. For example, if you want to quit smoking, they can give you advice and might prescribe medicines. If you want to improve your diet or get more physical activity, they can help you with this, too.   Lab tests, if needed - The tests you get will depend on your age and situation. For example, your doctor might want to check your:   Cholesterol   Blood sugar   Iron level   Vaccines - The recommended vaccines will depend on your age, health, and what vaccines you already had. Vaccines are very important because they can prevent certain serious or deadly infections.   Discussion of screening - \"Screening\" means checking for diseases or other health problems before they cause symptoms. Your doctor can recommend screening based on your age, risk, and preferences. This might include tests to check for:   Cancer, such as breast, prostate, cervical, ovarian, colorectal, prostate, lung, or skin cancer   Sexually transmitted infections, such as chlamydia and gonorrhea   Mental health conditions like depression and anxiety  Your doctor will talk to you about the different types of screening tests. They can help you decide which screenings to have. They can also explain what the results might mean.   Answering questions - The physical is a good time to ask the doctor or nurse questions about your health. If needed, they can refer you to other doctors or specialists, too.  Adults older than 65 years often need other care, too. As you get older, your doctor will talk to you about:   How to prevent falling at " home   Hearing or vision tests   Memory testing   How to take your medicines safely   Making sure that you have the help and support you need at home  All topics are updated as new evidence becomes available and our peer review process is complete.  This topic retrieved from [employer] on: May 02, 2024.  Topic 449221 Version 1.0  Release: 32.4.3 - C32.122  © 2024 UpToDate, Inc. and/or its affiliates. All rights reserved.  Consumer Information Use and Disclaimer   Disclaimer: This generalized information is a limited summary of diagnosis, treatment, and/or medication information. It is not meant to be comprehensive and should be used as a tool to help the user understand and/or assess potential diagnostic and treatment options. It does NOT include all information about conditions, treatments, medications, side effects, or risks that may apply to a specific patient. It is not intended to be medical advice or a substitute for the medical advice, diagnosis, or treatment of a health care provider based on the health care provider's examination and assessment of a patient's specific and unique circumstances. Patients must speak with a health care provider for complete information about their health, medical questions, and treatment options, including any risks or benefits regarding use of medications. This information does not endorse any treatments or medications as safe, effective, or approved for treating a specific patient. UpToDate, Inc. and its affiliates disclaim any warranty or liability relating to this information or the use thereof.The use of this information is governed by the Terms of Use, available at https://www.woltersVHXuwer.com/en/know/clinical-effectiveness-terms. 2024© UpToDate, Inc. and its affiliates and/or licensors. All rights reserved.  Copyright   © 2024 UpToDate, Inc. and/or its affiliates. All rights reserved.    Patient Education     Routine physical for adults   The Basics   Written by the  "doctors and editors at [facility]   What is a physical? -- A physical is a routine visit, or \"check-up,\" with your doctor. You might also hear it called a \"wellness visit\" or \"preventive visit.\"  During each visit, the doctor will:   Ask about your physical and mental health   Ask about your habits, behaviors, and lifestyle   Do an exam   Give you vaccines if needed   Talk to you about any medicines you take   Give advice about your health   Answer your questions  Getting regular check-ups is an important part of taking care of your health. It can help your doctor find and treat any problems you have. But it's also important for preventing health problems.  A routine physical is different from a \"sick visit.\" A sick visit is when you see a doctor because of a health concern or problem. Since physicals are scheduled ahead of time, you can think about what you want to ask the doctor.  How often should I get a physical? -- It depends on your age and health. In general, for people age 21 years and older:   If you are younger than 50 years, you might be able to get a physical every 3 years.   If you are 50 years or older, your doctor might recommend a physical every year.  If you have an ongoing health condition, like diabetes or high blood pressure, your doctor will probably want to see you more often.  What happens during a physical? -- In general, each visit will include:   Physical exam - The doctor or nurse will check your height, weight, heart rate, and blood pressure. They will also look at your eyes and ears. They will ask about how you are feeling and whether you have any symptoms that bother you.   Medicines - It's a good idea to bring a list of all the medicines you take to each doctor visit. Your doctor will talk to you about your medicines and answer any questions. Tell them if you are having any side effects that bother you. You should also tell them if you are having trouble paying for any of your " "medicines.   Habits and behaviors - This includes:   Your diet   Your exercise habits   Whether you smoke, drink alcohol, or use drugs   Whether you are sexually active   Whether you feel safe at home  Your doctor will talk to you about things you can do to improve your health and lower your risk of health problems. They will also offer help and support. For example, if you want to quit smoking, they can give you advice and might prescribe medicines. If you want to improve your diet or get more physical activity, they can help you with this, too.   Lab tests, if needed - The tests you get will depend on your age and situation. For example, your doctor might want to check your:   Cholesterol   Blood sugar   Iron level   Vaccines - The recommended vaccines will depend on your age, health, and what vaccines you already had. Vaccines are very important because they can prevent certain serious or deadly infections.   Discussion of screening - \"Screening\" means checking for diseases or other health problems before they cause symptoms. Your doctor can recommend screening based on your age, risk, and preferences. This might include tests to check for:   Cancer, such as breast, prostate, cervical, ovarian, colorectal, prostate, lung, or skin cancer   Sexually transmitted infections, such as chlamydia and gonorrhea   Mental health conditions like depression and anxiety  Your doctor will talk to you about the different types of screening tests. They can help you decide which screenings to have. They can also explain what the results might mean.   Answering questions - The physical is a good time to ask the doctor or nurse questions about your health. If needed, they can refer you to other doctors or specialists, too.  Adults older than 65 years often need other care, too. As you get older, your doctor will talk to you about:   How to prevent falling at home   Hearing or vision tests   Memory testing   How to take your " medicines safely   Making sure that you have the help and support you need at home  All topics are updated as new evidence becomes available and our peer review process is complete.  This topic retrieved from [employer] on: May 02, 2024.  Topic 173252 Version 1.0  Release: 32.4.3 - C32.122  © 2024 UpToDate, Inc. and/or its affiliates. All rights reserved.  Consumer Information Use and Disclaimer   Disclaimer: This generalized information is a limited summary of diagnosis, treatment, and/or medication information. It is not meant to be comprehensive and should be used as a tool to help the user understand and/or assess potential diagnostic and treatment options. It does NOT include all information about conditions, treatments, medications, side effects, or risks that may apply to a specific patient. It is not intended to be medical advice or a substitute for the medical advice, diagnosis, or treatment of a health care provider based on the health care provider's examination and assessment of a patient's specific and unique circumstances. Patients must speak with a health care provider for complete information about their health, medical questions, and treatment options, including any risks or benefits regarding use of medications. This information does not endorse any treatments or medications as safe, effective, or approved for treating a specific patient. UpToDate, Inc. and its affiliates disclaim any warranty or liability relating to this information or the use thereof.The use of this information is governed by the Terms of Use, available at https://www.woltersVivaRayuwer.com/en/know/clinical-effectiveness-terms. 2024© UpToDate, Inc. and its affiliates and/or licensors. All rights reserved.  Copyright   © 2024 UpToDate, Inc. and/or its affiliates. All rights reserved.    Patient Education     Routine physical for adults   The Basics   Written by the doctors and editors at [employer]   What is a physical? -- A physical is a  "routine visit, or \"check-up,\" with your doctor. You might also hear it called a \"wellness visit\" or \"preventive visit.\"  During each visit, the doctor will:   Ask about your physical and mental health   Ask about your habits, behaviors, and lifestyle   Do an exam   Give you vaccines if needed   Talk to you about any medicines you take   Give advice about your health   Answer your questions  Getting regular check-ups is an important part of taking care of your health. It can help your doctor find and treat any problems you have. But it's also important for preventing health problems.  A routine physical is different from a \"sick visit.\" A sick visit is when you see a doctor because of a health concern or problem. Since physicals are scheduled ahead of time, you can think about what you want to ask the doctor.  How often should I get a physical? -- It depends on your age and health. In general, for people age 21 years and older:   If you are younger than 50 years, you might be able to get a physical every 3 years.   If you are 50 years or older, your doctor might recommend a physical every year.  If you have an ongoing health condition, like diabetes or high blood pressure, your doctor will probably want to see you more often.  What happens during a physical? -- In general, each visit will include:   Physical exam - The doctor or nurse will check your height, weight, heart rate, and blood pressure. They will also look at your eyes and ears. They will ask about how you are feeling and whether you have any symptoms that bother you.   Medicines - It's a good idea to bring a list of all the medicines you take to each doctor visit. Your doctor will talk to you about your medicines and answer any questions. Tell them if you are having any side effects that bother you. You should also tell them if you are having trouble paying for any of your medicines.   Habits and behaviors - This includes:   Your diet   Your exercise " "habits   Whether you smoke, drink alcohol, or use drugs   Whether you are sexually active   Whether you feel safe at home  Your doctor will talk to you about things you can do to improve your health and lower your risk of health problems. They will also offer help and support. For example, if you want to quit smoking, they can give you advice and might prescribe medicines. If you want to improve your diet or get more physical activity, they can help you with this, too.   Lab tests, if needed - The tests you get will depend on your age and situation. For example, your doctor might want to check your:   Cholesterol   Blood sugar   Iron level   Vaccines - The recommended vaccines will depend on your age, health, and what vaccines you already had. Vaccines are very important because they can prevent certain serious or deadly infections.   Discussion of screening - \"Screening\" means checking for diseases or other health problems before they cause symptoms. Your doctor can recommend screening based on your age, risk, and preferences. This might include tests to check for:   Cancer, such as breast, prostate, cervical, ovarian, colorectal, prostate, lung, or skin cancer   Sexually transmitted infections, such as chlamydia and gonorrhea   Mental health conditions like depression and anxiety  Your doctor will talk to you about the different types of screening tests. They can help you decide which screenings to have. They can also explain what the results might mean.   Answering questions - The physical is a good time to ask the doctor or nurse questions about your health. If needed, they can refer you to other doctors or specialists, too.  Adults older than 65 years often need other care, too. As you get older, your doctor will talk to you about:   How to prevent falling at home   Hearing or vision tests   Memory testing   How to take your medicines safely   Making sure that you have the help and support you need at home  All " topics are updated as new evidence becomes available and our peer review process is complete.  This topic retrieved from [employer] on: May 02, 2024.  Topic 204382 Version 1.0  Release: 32.4.3 - C32.122  © 2024 UpToDate, Inc. and/or its affiliates. All rights reserved.  Consumer Information Use and Disclaimer   Disclaimer: This generalized information is a limited summary of diagnosis, treatment, and/or medication information. It is not meant to be comprehensive and should be used as a tool to help the user understand and/or assess potential diagnostic and treatment options. It does NOT include all information about conditions, treatments, medications, side effects, or risks that may apply to a specific patient. It is not intended to be medical advice or a substitute for the medical advice, diagnosis, or treatment of a health care provider based on the health care provider's examination and assessment of a patient's specific and unique circumstances. Patients must speak with a health care provider for complete information about their health, medical questions, and treatment options, including any risks or benefits regarding use of medications. This information does not endorse any treatments or medications as safe, effective, or approved for treating a specific patient. UpToDate, Inc. and its affiliates disclaim any warranty or liability relating to this information or the use thereof.The use of this information is governed by the Terms of Use, available at https://www.woltersIntegromicsuwer.com/en/know/clinical-effectiveness-terms. 2024© UpToDate, Inc. and its affiliates and/or licensors. All rights reserved.  Copyright   © 2024 UpToDate, Inc. and/or its affiliates. All rights reserved.

## 2024-09-16 ENCOUNTER — TELEPHONE (OUTPATIENT)
Dept: GASTROENTEROLOGY | Facility: CLINIC | Age: 49
End: 2024-09-16

## 2024-09-16 NOTE — TELEPHONE ENCOUNTER
Received a 2nd request from [facility] for patients medical records. Scanned to [facility].. & into patients chart............

## 2024-10-03 ENCOUNTER — TELEPHONE (OUTPATIENT)
Age: 49
End: 2024-10-03

## 2024-10-03 NOTE — TELEPHONE ENCOUNTER
Patients GI provider:  Dr. Dietz    Number to return call: [phone]    Reason for call: Farheen andrade [facility] called regarding a medical record request. I informed her request was received and sent to O    Scheduled procedure/appointment date if applicable: Apt/procedure N/A

## 2024-11-03 DIAGNOSIS — G43.009 MIGRAINE WITHOUT AURA AND WITHOUT STATUS MIGRAINOSUS, NOT INTRACTABLE: ICD-10-CM

## 2024-11-03 DIAGNOSIS — R90.89 ABNORMAL FINDING ON MRI OF BRAIN: ICD-10-CM

## 2024-11-05 RX ORDER — SUMATRIPTAN 50 MG/1
TABLET, FILM COATED ORAL
Qty: 9 TABLET | Refills: 2 | Status: SHIPPED | OUTPATIENT
Start: 2024-11-05

## 2025-01-07 ENCOUNTER — TELEPHONE (OUTPATIENT)
Dept: GASTROENTEROLOGY | Facility: CLINIC | Age: 50
End: 2025-01-07

## 2025-01-07 NOTE — TELEPHONE ENCOUNTER
Received a 3rd request from [facility] for medical records. Scanned to [facility] a 3rd time.............................

## 2025-01-14 DIAGNOSIS — G43.009 MIGRAINE WITHOUT AURA AND WITHOUT STATUS MIGRAINOSUS, NOT INTRACTABLE: ICD-10-CM

## 2025-01-14 DIAGNOSIS — R90.89 ABNORMAL FINDING ON MRI OF BRAIN: ICD-10-CM

## 2025-01-15 RX ORDER — SUMATRIPTAN 50 MG/1
TABLET, FILM COATED ORAL
Qty: 9 TABLET | Refills: 2 | Status: SHIPPED | OUTPATIENT
Start: 2025-01-15

## 2025-01-29 ENCOUNTER — TELEPHONE (OUTPATIENT)
Dept: NEUROLOGY | Facility: CLINIC | Age: 50
End: 2025-01-29

## 2025-01-29 NOTE — TELEPHONE ENCOUNTER
Reason for call:   [x] Prior Auth  [] Other:     Caller:  [] Patient  [x] Pharmacy  Name:   Address:   Callback Number:     Medication: Imitrex 50 mg as directed       Ordering Provider:   [] PCP/Provider -   [x] Speciality/Provider - [facility]     Has the patient tried other medications and failed? If failed, which medications did they fail?    [] No   [] Yes -     Is the patient's insurance updated in EPIC?   [x] Yes   [] No     Is a copy of the patient's insurance scanned in EPIC?   [x] Yes   [] No

## 2025-01-29 NOTE — TELEPHONE ENCOUNTER
Received via [employer] from [facility] Requested for Sumatriptan Succ 50 mg tablet.  Request scanned into .

## 2025-01-30 NOTE — TELEPHONE ENCOUNTER
"Per fax received from [facility], preferred medication is Rizatriptan. Pt has already tried and failed this.    Called pharmacy to obtain rx plan information. Pharmacist says pt may have gotten new insurance for this new year.     After \"cleaning up\" pt's file, pharmacist got a paid claim for Sumatriptan with copay cost of $9. Says they must have run old insurance but now all is good.    No PA required. Nothing further at this time.  "

## 2025-02-26 ENCOUNTER — TELEPHONE (OUTPATIENT)
Dept: GASTROENTEROLOGY | Facility: CLINIC | Age: 50
End: 2025-02-26

## 2025-04-03 ENCOUNTER — OFFICE VISIT (OUTPATIENT)
Age: 50
End: 2025-04-03
Payer: COMMERCIAL

## 2025-04-03 VITALS
OXYGEN SATURATION: 97 % | HEIGHT: 59 IN | HEART RATE: 75 BPM | SYSTOLIC BLOOD PRESSURE: 88 MMHG | TEMPERATURE: 96.6 F | BODY MASS INDEX: 34.88 KG/M2 | RESPIRATION RATE: 14 BRPM | WEIGHT: 173 LBS | DIASTOLIC BLOOD PRESSURE: 58 MMHG

## 2025-04-03 DIAGNOSIS — Z23 NEED FOR SHINGLES VACCINE: ICD-10-CM

## 2025-04-03 DIAGNOSIS — R06.2 WHEEZING: ICD-10-CM

## 2025-04-03 DIAGNOSIS — M79.641 RIGHT HAND PAIN: Primary | ICD-10-CM

## 2025-04-03 DIAGNOSIS — R20.2 PARESTHESIA OF RIGHT FOOT: ICD-10-CM

## 2025-04-03 PROCEDURE — 99214 OFFICE O/P EST MOD 30 MIN: CPT | Performed by: FAMILY MEDICINE

## 2025-04-03 RX ORDER — SERTRALINE HYDROCHLORIDE 100 MG/1
TABLET, FILM COATED ORAL
COMMUNITY
Start: 2024-11-06

## 2025-04-03 RX ORDER — GABAPENTIN 300 MG/1
600 CAPSULE ORAL 3 TIMES DAILY
Qty: 180 CAPSULE | Refills: 0 | Status: SHIPPED | OUTPATIENT
Start: 2025-04-03

## 2025-04-03 RX ORDER — TRETINOIN 0.25 MG/G
CREAM TOPICAL
COMMUNITY
Start: 2025-01-30

## 2025-04-03 RX ORDER — METHYLPREDNISOLONE 4 MG/1
TABLET ORAL
Qty: 21 EACH | Refills: 0 | Status: SHIPPED | OUTPATIENT
Start: 2025-04-03

## 2025-04-03 RX ORDER — FERROUS SULFATE 325(65) MG
1 TABLET ORAL
COMMUNITY
Start: 2025-01-14

## 2025-04-03 RX ORDER — ESZOPICLONE 2 MG/1
2 TABLET, FILM COATED ORAL DAILY PRN
COMMUNITY

## 2025-04-03 RX ORDER — ZOSTER VACCINE RECOMBINANT, ADJUVANTED 50 MCG/0.5
0.5 KIT INTRAMUSCULAR ONCE
Qty: 1 EACH | Refills: 1 | Status: SHIPPED | OUTPATIENT
Start: 2025-04-03 | End: 2025-04-03

## 2025-04-03 NOTE — PROGRESS NOTES
Name: Zoila Guerrero      : 1975      MRN: [id number]  Encounter Provider: Nikkie Hartmann DO  Encounter Date: 4/3/2025   Encounter department: [facility] [place]  :  Assessment & Plan  Right hand pain  OA versus tendinitis.  Will evaluate for significant bony pathology via imaging.  Steroid anti-inflammatories abided  Orders:    XR hand 3+ vw right; Future    methylPREDNISolone 4 MG tablet therapy pack; Use as directed on package    Paresthesia of right foot  Worsening.  Not associated with weakness.  Likely peripheral neuropathy association with spinal dysfunction.  Will further evaluate with EMG.  Patient did increase gabapentin dose for analgesia  Orders:    EMG; Future    gabapentin (Neurontin) 300 mg capsule; Take 2 capsules (600 mg total) by mouth 3 (three) times a day    Wheezing  Scattered wheeze heard throughout exam.  Patient denies dyspnea.  Continues to smoke tobacco.  Counseling provided.  Will further evaluate with imaging and PFTs.  Orders:    Complete PFT with post bronchodilator; Future    XR chest pa and lateral; Future    Need for shingles vaccine    Orders:    Zoster Vac Recomb Adjuvanted (Shingrix) 50 MCG/0.5ML SUSR; Inject 0.5 mL into a muscle once for 1 dose Repeat dose in 2 to 6 months          Depression Screening and Follow-up Plan: Patient was screened for depression during today's encounter. They screened negative with a PHQ-9 score of 4.      Tobacco Cessation Counseling: Tobacco cessation counseling was provided. The patient is sincerely urged to quit consumption of tobacco. She is not ready to quit tobacco. Medication options and side effects of medication discussed. Patient refused medication. 4 minutes of smoking cessation counseling provided      History of Present Illness   Pt presetn c/o right hand pain.  Mostly the third digit of the hand and radiates into the palm.  Associated with stiffness that is worse in the morning.  Having difficulty  "bending the fingers.  Using topical agents and ibuprofen but it does not help.  Denies injury to the hand.  Reports worsening of a cold burning-like pain at the bottom of her right foot.  Has been going on for over 6 months.  Has been on gabapentin and Elavil with no improvement..         Review of Systems   Constitutional:  Negative for fever.   Respiratory:  Negative for shortness of breath.    Cardiovascular:  Negative for chest pain.   Musculoskeletal:  Positive for arthralgias, joint swelling (right hand) and myalgias.   Psychiatric/Behavioral:  Positive for sleep disturbance.        Objective   BP (!) 88/58 (BP Location: Left arm, Patient Position: Sitting)   Pulse 75   Temp (!) 96.6 °F (35.9 °C) (Tympanic)   Resp 14   Ht 4' 11\" (1.499 m)   Wt 78.5 kg (173 lb)   SpO2 97%   BMI 34.94 kg/m²      Physical Exam  HENT:      Head: Normocephalic.   Eyes:      Conjunctiva/sclera: Conjunctivae normal.   Cardiovascular:      Rate and Rhythm: Normal rate and regular rhythm.      Heart sounds: No murmur heard.  Pulmonary:      Effort: Pulmonary effort is normal.      Breath sounds: Wheezing present.   Abdominal:      General: Bowel sounds are normal.      Palpations: Abdomen is soft.   Musculoskeletal:         General: No tenderness.      Right hand: Bony tenderness present. No swelling. Decreased range of motion. Decreased strength of finger abduction (3rd digit).      Right lower leg: No edema.      Left lower leg: No edema.   Neurological:      Mental Status: She is alert.         "

## 2025-05-29 ENCOUNTER — RESULTS FOLLOW-UP (OUTPATIENT)
Age: 50
End: 2025-05-29

## 2025-05-29 ENCOUNTER — APPOINTMENT (OUTPATIENT)
Age: 50
End: 2025-05-29
Payer: COMMERCIAL

## 2025-05-29 DIAGNOSIS — R06.2 WHEEZING: ICD-10-CM

## 2025-05-29 DIAGNOSIS — M79.641 RIGHT HAND PAIN: ICD-10-CM

## 2025-05-29 PROCEDURE — 71046 X-RAY EXAM CHEST 2 VIEWS: CPT

## 2025-05-29 PROCEDURE — 73130 X-RAY EXAM OF HAND: CPT

## 2025-05-29 NOTE — TELEPHONE ENCOUNTER
S/w Pt results and recommendations discussed.Pt has no further questions at this time and agrees with plan.

## 2025-05-29 NOTE — TELEPHONE ENCOUNTER
----- Message from Nikkie Hartmann DO sent at 5/29/2025 10:46 AM EDT -----  Xray of the right hand was normal. Pt likely tendinitis. Besides antiinflammatory hand pt/o is recommended to help long term  ----- Message -----  From: Interface, Radiology Results In  Sent: 5/29/2025  10:34 AM EDT  To: Nikkie Hartmann DO

## 2025-06-13 ENCOUNTER — PATIENT MESSAGE (OUTPATIENT)
Age: 50
End: 2025-06-13

## 2025-06-13 DIAGNOSIS — M79.641 RIGHT HAND PAIN: Primary | ICD-10-CM

## 2025-06-19 ENCOUNTER — RESULTS FOLLOW-UP (OUTPATIENT)
Age: 50
End: 2025-06-19

## 2025-06-19 ENCOUNTER — OFFICE VISIT (OUTPATIENT)
Dept: OBGYN CLINIC | Facility: CLINIC | Age: 50
End: 2025-06-19
Payer: COMMERCIAL

## 2025-06-19 ENCOUNTER — PREP FOR PROCEDURE (OUTPATIENT)
Dept: OBGYN CLINIC | Facility: CLINIC | Age: 50
End: 2025-06-19

## 2025-06-19 ENCOUNTER — APPOINTMENT (OUTPATIENT)
Dept: LAB | Facility: AMBULARY SURGERY CENTER | Age: 50
End: 2025-06-19
Payer: COMMERCIAL

## 2025-06-19 VITALS — HEIGHT: 59 IN | WEIGHT: 173 LBS | BODY MASS INDEX: 34.88 KG/M2

## 2025-06-19 DIAGNOSIS — Z13.6 SCREENING FOR CARDIOVASCULAR CONDITION: ICD-10-CM

## 2025-06-19 DIAGNOSIS — M65.321 TRIGGER INDEX FINGER OF RIGHT HAND: Primary | ICD-10-CM

## 2025-06-19 DIAGNOSIS — M65.331 TRIGGER FINGER, RIGHT MIDDLE FINGER: ICD-10-CM

## 2025-06-19 DIAGNOSIS — M65.321 TRIGGER INDEX FINGER OF RIGHT HAND: ICD-10-CM

## 2025-06-19 DIAGNOSIS — M65.351 TRIGGER FINGER, RIGHT LITTLE FINGER: ICD-10-CM

## 2025-06-19 DIAGNOSIS — M65.341 TRIGGER FINGER, RIGHT RING FINGER: ICD-10-CM

## 2025-06-19 DIAGNOSIS — E55.9 VITAMIN D DEFICIENCY: ICD-10-CM

## 2025-06-19 DIAGNOSIS — M79.641 RIGHT HAND PAIN: ICD-10-CM

## 2025-06-19 LAB
25(OH)D3 SERPL-MCNC: 22.9 NG/ML (ref 30–100)
ALBUMIN SERPL BCG-MCNC: 4.7 G/DL (ref 3.5–5)
ALP SERPL-CCNC: 79 U/L (ref 34–104)
ALT SERPL W P-5'-P-CCNC: 8 U/L (ref 7–52)
ANION GAP SERPL CALCULATED.3IONS-SCNC: 7 MMOL/L (ref 4–13)
AST SERPL W P-5'-P-CCNC: 13 U/L (ref 13–39)
BILIRUB SERPL-MCNC: 0.38 MG/DL (ref 0.2–1)
BUN SERPL-MCNC: 13 MG/DL (ref 5–25)
CALCIUM SERPL-MCNC: 10 MG/DL (ref 8.4–10.2)
CHLORIDE SERPL-SCNC: 103 MMOL/L (ref 96–108)
CHOLEST SERPL-MCNC: 255 MG/DL (ref ?–200)
CO2 SERPL-SCNC: 30 MMOL/L (ref 21–32)
CREAT SERPL-MCNC: 0.75 MG/DL (ref 0.6–1.3)
ERYTHROCYTE [DISTWIDTH] IN BLOOD BY AUTOMATED COUNT: 13.9 % (ref 11.6–15.1)
GFR SERPL CREATININE-BSD FRML MDRD: 92 ML/MIN/1.73SQ M
GLUCOSE P FAST SERPL-MCNC: 90 MG/DL (ref 65–99)
HCT VFR BLD AUTO: 46.8 % (ref 34.8–46.1)
HDLC SERPL-MCNC: 55 MG/DL
HGB BLD-MCNC: 14.4 G/DL (ref 11.5–15.4)
LDLC SERPL CALC-MCNC: 157 MG/DL (ref 0–100)
MCH RBC QN AUTO: 28.8 PG (ref 26.8–34.3)
MCHC RBC AUTO-ENTMCNC: 30.8 G/DL (ref 31.4–37.4)
MCV RBC AUTO: 94 FL (ref 82–98)
NONHDLC SERPL-MCNC: 200 MG/DL
PLATELET # BLD AUTO: 305 THOUSANDS/UL (ref 149–390)
PMV BLD AUTO: 11.2 FL (ref 8.9–12.7)
POTASSIUM SERPL-SCNC: 4.9 MMOL/L (ref 3.5–5.3)
PROT SERPL-MCNC: 7.5 G/DL (ref 6.4–8.4)
RBC # BLD AUTO: 5 MILLION/UL (ref 3.81–5.12)
SODIUM SERPL-SCNC: 140 MMOL/L (ref 135–147)
TRIGL SERPL-MCNC: 214 MG/DL (ref ?–150)
WBC # BLD AUTO: 7.3 THOUSAND/UL (ref 4.31–10.16)

## 2025-06-19 PROCEDURE — 80061 LIPID PANEL: CPT

## 2025-06-19 PROCEDURE — 85027 COMPLETE CBC AUTOMATED: CPT

## 2025-06-19 PROCEDURE — 99214 OFFICE O/P EST MOD 30 MIN: CPT | Performed by: ORTHOPAEDIC SURGERY

## 2025-06-19 PROCEDURE — 36415 COLL VENOUS BLD VENIPUNCTURE: CPT

## 2025-06-19 PROCEDURE — 82306 VITAMIN D 25 HYDROXY: CPT

## 2025-06-19 PROCEDURE — 80053 COMPREHEN METABOLIC PANEL: CPT

## 2025-06-19 RX ORDER — SODIUM CHLORIDE, SODIUM LACTATE, POTASSIUM CHLORIDE, CALCIUM CHLORIDE 600; 310; 30; 20 MG/100ML; MG/100ML; MG/100ML; MG/100ML
20 INJECTION, SOLUTION INTRAVENOUS CONTINUOUS
OUTPATIENT
Start: 2025-06-19

## 2025-06-19 RX ORDER — CHLORHEXIDINE GLUCONATE ORAL RINSE 1.2 MG/ML
15 SOLUTION DENTAL ONCE
OUTPATIENT
Start: 2025-06-19 | End: 2025-06-19

## 2025-06-19 RX ORDER — CHLORHEXIDINE GLUCONATE 40 MG/ML
SOLUTION TOPICAL DAILY PRN
OUTPATIENT
Start: 2025-06-19

## 2025-06-19 NOTE — ASSESSMENT & PLAN NOTE
Physical exam, diagnostic imaging, and subjective history are all most consistent with the above diagnosis. The pathoanatomy and natural history of this diagnosis was explained to the patient in detail.   Patient's pain and associated symptoms have proven refractory to non-operative treatments including: cortisone injections, activity modification, oral and topical analgesics  At this time, after informed discussion, patient wishes to proceed with Trigger Finger Release  right  long finger, small finger  Risks and benefits were discussed. Risks of the surgery are inclusive of but not limited to bleeding, infection, nerve injury, blood clot, worsening of symptoms, not achieving the anticipated results, persistent stiffness, weakness and the need for additional surgery. The patient verbally stated they understood those risks and would like to proceed with the surgery. Surgical consent was signed in the office today. I will see the patient the day of surgery.    Orders:    Ambulatory Referral to Orthopedic Surgery    Case request operating room: RELEASE TRIGGER FINGER pinky and middle; Standing    CBC and Platelet; Future    Comprehensive metabolic panel; Future    Ambulatory referral to Family Practice; Future

## 2025-06-19 NOTE — H&P (VIEW-ONLY)
Name: Zoila Guerrero      : 1975      MRN: [id number]  Encounter Provider: Jeremías Melendez DO  Encounter Date: 2025   Encounter department: [facility]      :  Assessment & Plan  Trigger index finger of right hand  Trigger finger, right little finger  Trigger finger, right ring finger  Trigger finger, right middle finger  Right hand pain  Physical exam, diagnostic imaging, and subjective history are all most consistent with the above diagnosis. The pathoanatomy and natural history of this diagnosis was explained to the patient in detail.   Patient's pain and associated symptoms have proven refractory to non-operative treatments including: cortisone injections, activity modification, oral and topical analgesics  At this time, after informed discussion, patient wishes to proceed with Trigger Finger Release  right  long finger, small finger  Risks and benefits were discussed. Risks of the surgery are inclusive of but not limited to bleeding, infection, nerve injury, blood clot, worsening of symptoms, not achieving the anticipated results, persistent stiffness, weakness and the need for additional surgery. The patient verbally stated they understood those risks and would like to proceed with the surgery. Surgical consent was signed in the office today. I will see the patient the day of surgery.    Orders:    Ambulatory Referral to Orthopedic Surgery    Case request operating room: RELEASE TRIGGER FINGER pinky and middle; Standing    CBC and Platelet; Future    Comprehensive metabolic panel; Future    Ambulatory referral to Family Practice; Future          Subjective:  Zoila Guerrero is a 49 y.o. female who presents today for evaluation and treatment of right hand pain in the area of her A1 pulley of her 2nd-5th fingers. She has had all 4 A1 pulleys injected in the past with minimal relief of her symptoms. Her most recent injections were to the right ring and long  fingers on 7/13/24. She has tried multiple topical creams with no relief. She has significant pain with motion of her fingers, picking up items like grocery bags.       The following portions of the patient's history were reviewed and updated as appropriate: allergies, current medications, past family history, past social history, past surgical history and problem list.      Social History     Socioeconomic History    Marital status: /Civil Union     Spouse name: Not on file    Number of children: Not on file    Years of education: Not on file    Highest education level: Not on file   Occupational History    Not on file   Tobacco Use    Smoking status: Every Day     Current packs/day: 0.25     Average packs/day: 0.3 packs/day for 37.5 years (9.4 ttl pk-yrs)     Types: Cigarettes     Start date: 1988     Passive exposure: Current    Smokeless tobacco: Never   Vaping Use    Vaping status: Never Used   Substance and Sexual Activity    Alcohol use: Not Currently    Drug use: Never    Sexual activity: Not on file   Other Topics Concern    Not on file   Social History Narrative    Not on file     Social Drivers of Health     Financial Resource Strain: High Risk (1/8/2025)    Received from [facility]    Overall Financial Resource Strain (CARDIA)     Difficulty of Paying Living Expenses: Hard   Food Insecurity: Food Insecurity Present (1/8/2025)    Received from [facility]    Hunger Vital Sign     Within the past 12 months, you worried that your food would run out before you got the money to buy more.: Sometimes true     Within the past 12 months, the food you bought just didn't last and you didn't have money to get more.: Sometimes true   Transportation Needs: No Transportation Needs (1/8/2025)    Received from [facility]    PRAPARE - Transportation     Lack of Transportation (Medical): No     Lack of Transportation (Non-Medical): No   Physical Activity:  Sufficiently Active (8/22/2024)    Exercise Vital Sign     Days of Exercise per Week: 5 days     Minutes of Exercise per Session: 60 min   Stress: Not on file   Social Connections: Unknown (1/8/2025)    Received from [facility]    OASIS : Social Isolation     How often do you feel lonely or isolated from those around you?: Patient unable to respond   Intimate Partner Violence: Not At Risk (1/8/2025)    Received from [facility]    Humiliation, Afraid, Rape, and Kick questionnaire     Within the last year, have you been afraid of your partner or ex-partner?: No     Within the last year, have you been humiliated or emotionally abused in other ways by your partner or ex-partner?: No     Within the last year, have you been kicked, hit, slapped, or otherwise physically hurt by your partner or ex-partner?: No     Within the last year, have you been raped or forced to have any kind of sexual activity by your partner or ex-partner?: No   Housing Stability: High Risk (1/8/2025)    Received from [facility]    Housing Stability Vital Sign     In the last 12 months, was there a time when you were not able to pay the mortgage or rent on time?: Yes     In the past 12 months, how many times have you moved where you were living?: 0     At any time in the past 12 months, were you homeless or living in a shelter (including now)?: No     Past Medical History[1]  Past Surgical History[2]  Allergies[3]  Medications Ordered Prior to Encounter[4]         Objective:    Review of Systems  Pertinent items are noted in HPI.  All other systems were reviewed and are negative.    Physical Exam  Cons: Appears well.  No apparent distress.  Psych: Alert. Oriented x3.  Mood and affect normal.  Eyes: PERRLA, EOMI  Resp: Normal effort.  No audible wheezing or stridor.  CV: Palpable pulse.  No discernable arrhythmia.  No LE edema.  Lymph:  No palpable cervical, axillary, or inguinal  "lymphadenopathy.  Skin: Warm.  No palpable masses.  No visible lesions.  Neuro: Normal muscle tone.  Normal and symmetric DTR's.    BMI:   Estimated body mass index is 34.94 kg/m² as calculated from the following:    Height as of this encounter: 4' 11\" (1.499 m).    Weight as of this encounter: 78.5 kg (173 lb).  Lab Results   Component Value Date    HGBA1C 5.9 (H) 09/10/2024         Ortho Exam  right 2nd-5th finger:   Patient presents with no obvious anatomical deformity  Skin is warm and dry to touch with no signs of erythema, ecchymosis, or infection  No soft tissue swelling or effusion noted  Full FDS, FDP, extensor mechanisms are intact  Unable to achieve composite fist  No rotational deformity with composite finger flexion  Positive palpable nodule over the A1 pulley, nodule is largest in the ring finger  Positive tenderness to palpation over A1 pulley  Positive catching. Positive clicking.    Demonstrates normal wrist, elbow, and shoulder motion  Forearm compartments are soft and supple  2+ distal radial pulse with brisk capillary refill to the fingers  Radial, median, and ulnar motor and sensory distribution intact  Sensations light to touch intact distally      Procedures  Procedures  No Procedures performed today    Diagnostics, reviewed and taken today if performed as documented:  I have personally reviewed pertinent films in PACS and my interpretation is minimal osteoarthritis of the IP joints, CMC joint, radiocarpal joints. .    Scribe Attestation      I,:  Tiana Royal am acting as a scribe while in the presence of the attending physician.:       I,:  Jeremías Melendez, DO personally performed the services described in this documentation    as scribed in my presence.:                   Portions of the record may have been created with voice recognition software.  Occasional wrong word or \"sound a like\" substitutions may have occurred due to the inherent limitations of voice recognition software.  Read " the chart carefully and recognize, using context, where substitutions have occurred.         [1]   Past Medical History:  Diagnosis Date    Bladder perforation, intraoperative     Depression     Intestine, perforation (HCC)     intra op    Sepsis (HCC)    [2]   Past Surgical History:  Procedure Laterality Date    ABDOMINAL SURGERY      BLADDER SURGERY       SECTION      COLONOSCOPY      CT NEEDLE BIOPSY RETROPERITONEUM  2020    HYSTERECTOMY  2015    INCISIONAL HERNIA REPAIR N/A 2021    Procedure: OPEN REPAIR VENTRAL/INCISIONAL HERNIA W/MESH;  Surgeon: Glendy Mcmahon MD;  Location: [facility];  Service: General    OOPHORECTOMY Bilateral    [3] No Known Allergies  [4]   Current Outpatient Medications on File Prior to Visit   Medication Sig Dispense Refill    acetaZOLAMIDE (DIAMOX) 500 mg capsule Take 2 capsules (1,000 mg total) by mouth 2 (two) times a day (Patient not taking: Reported on 4/3/2025) 180 capsule 3    amitriptyline (ELAVIL) 10 mg tablet Start with 10 mg at bed time, if no relief and able to tolerate please increase the dose to 20 mg at bed time. (Patient not taking: Reported on 4/3/2025) 90 tablet 3    Diclofenac Sodium (VOLTAREN) 1 % Apply 2 g topically 4 (four) times a day 150 g 1    eszopiclone (LUNESTA) 2 mg tablet Take 2 mg by mouth daily as needed      ferrous sulfate 325 (65 Fe) mg tablet Take 1 tablet by mouth daily with breakfast      gabapentin (Neurontin) 300 mg capsule Take 2 capsules (600 mg total) by mouth 3 (three) times a day 180 capsule 0    ibuprofen (MOTRIN) 800 mg tablet Take 1 tablet (800 mg total) by mouth 2 (two) times a day for 10 days 20 tablet 0    methylPREDNISolone 4 MG tablet therapy pack Use as directed on package 21 each 0    nicotine (NICODERM CQ) 14 mg/24hr TD 24 hr patch Place 1 patch on the skin over 24 hours every 24 hours (Patient not taking: Reported on 4/3/2025) 28 patch 0    sertraline (ZOLOFT) 100 mg tablet Take 1 tablet (50 mg) with one  100 mg tablet by mouth for a total daily dose of 150 mg.      SUMAtriptan (IMITREX) 50 mg tablet PLEASE SEE ATTACHED FOR DETAILED DIRECTIONS (Patient not taking: Reported on 4/3/2025) 9 tablet 2    tretinoin (RETIN-A) 0.025 % cream APPLY A PEA SIZED AMOUNT TO FACE AT BEDTIME.      valACYclovir (VALTREX) 1,000 mg tablet Take 2 tablets (2,000 mg total) by mouth 2 (two) times a day for 1 day 4 tablet 0     No current facility-administered medications on file prior to visit.

## 2025-06-19 NOTE — PROGRESS NOTES
Name: Zoila Guerrero      : 1975      MRN: [id number]  Encounter Provider: Jeremías Melendez DO  Encounter Date: 2025   Encounter department: [facility]      :  Assessment & Plan  Trigger index finger of right hand  Trigger finger, right little finger  Trigger finger, right ring finger  Trigger finger, right middle finger  Right hand pain  Physical exam, diagnostic imaging, and subjective history are all most consistent with the above diagnosis. The pathoanatomy and natural history of this diagnosis was explained to the patient in detail.   Patient's pain and associated symptoms have proven refractory to non-operative treatments including: cortisone injections, activity modification, oral and topical analgesics  At this time, after informed discussion, patient wishes to proceed with Trigger Finger Release  right  long finger, small finger  Risks and benefits were discussed. Risks of the surgery are inclusive of but not limited to bleeding, infection, nerve injury, blood clot, worsening of symptoms, not achieving the anticipated results, persistent stiffness, weakness and the need for additional surgery. The patient verbally stated they understood those risks and would like to proceed with the surgery. Surgical consent was signed in the office today. I will see the patient the day of surgery.    Orders:    Ambulatory Referral to Orthopedic Surgery    Case request operating room: RELEASE TRIGGER FINGER pinky and middle; Standing    CBC and Platelet; Future    Comprehensive metabolic panel; Future    Ambulatory referral to Family Practice; Future          Subjective:  Zoila Guerrero is a 49 y.o. female who presents today for evaluation and treatment of right hand pain in the area of her A1 pulley of her 2nd-5th fingers. She has had all 4 A1 pulleys injected in the past with minimal relief of her symptoms. Her most recent injections were to the right ring and long  fingers on 7/13/24. She has tried multiple topical creams with no relief. She has significant pain with motion of her fingers, picking up items like grocery bags.       The following portions of the patient's history were reviewed and updated as appropriate: allergies, current medications, past family history, past social history, past surgical history and problem list.      Social History     Socioeconomic History    Marital status: /Civil Union     Spouse name: Not on file    Number of children: Not on file    Years of education: Not on file    Highest education level: Not on file   Occupational History    Not on file   Tobacco Use    Smoking status: Every Day     Current packs/day: 0.25     Average packs/day: 0.3 packs/day for 37.5 years (9.4 ttl pk-yrs)     Types: Cigarettes     Start date: 1988     Passive exposure: Current    Smokeless tobacco: Never   Vaping Use    Vaping status: Never Used   Substance and Sexual Activity    Alcohol use: Not Currently    Drug use: Never    Sexual activity: Not on file   Other Topics Concern    Not on file   Social History Narrative    Not on file     Social Drivers of Health     Financial Resource Strain: High Risk (1/8/2025)    Received from [facility]    Overall Financial Resource Strain (CARDIA)     Difficulty of Paying Living Expenses: Hard   Food Insecurity: Food Insecurity Present (1/8/2025)    Received from [facility]    Hunger Vital Sign     Within the past 12 months, you worried that your food would run out before you got the money to buy more.: Sometimes true     Within the past 12 months, the food you bought just didn't last and you didn't have money to get more.: Sometimes true   Transportation Needs: No Transportation Needs (1/8/2025)    Received from [facility]    PRAPARE - Transportation     Lack of Transportation (Medical): No     Lack of Transportation (Non-Medical): No   Physical Activity:  Sufficiently Active (8/22/2024)    Exercise Vital Sign     Days of Exercise per Week: 5 days     Minutes of Exercise per Session: 60 min   Stress: Not on file   Social Connections: Unknown (1/8/2025)    Received from [facility]    OASIS : Social Isolation     How often do you feel lonely or isolated from those around you?: Patient unable to respond   Intimate Partner Violence: Not At Risk (1/8/2025)    Received from [facility]    Humiliation, Afraid, Rape, and Kick questionnaire     Within the last year, have you been afraid of your partner or ex-partner?: No     Within the last year, have you been humiliated or emotionally abused in other ways by your partner or ex-partner?: No     Within the last year, have you been kicked, hit, slapped, or otherwise physically hurt by your partner or ex-partner?: No     Within the last year, have you been raped or forced to have any kind of sexual activity by your partner or ex-partner?: No   Housing Stability: High Risk (1/8/2025)    Received from [facility]    Housing Stability Vital Sign     In the last 12 months, was there a time when you were not able to pay the mortgage or rent on time?: Yes     In the past 12 months, how many times have you moved where you were living?: 0     At any time in the past 12 months, were you homeless or living in a shelter (including now)?: No     Past Medical History[1]  Past Surgical History[2]  Allergies[3]  Medications Ordered Prior to Encounter[4]         Objective:    Review of Systems  Pertinent items are noted in HPI.  All other systems were reviewed and are negative.    Physical Exam  Cons: Appears well.  No apparent distress.  Psych: Alert. Oriented x3.  Mood and affect normal.  Eyes: PERRLA, EOMI  Resp: Normal effort.  No audible wheezing or stridor.  CV: Palpable pulse.  No discernable arrhythmia.  No LE edema.  Lymph:  No palpable cervical, axillary, or inguinal  "lymphadenopathy.  Skin: Warm.  No palpable masses.  No visible lesions.  Neuro: Normal muscle tone.  Normal and symmetric DTR's.    BMI:   Estimated body mass index is 34.94 kg/m² as calculated from the following:    Height as of this encounter: 4' 11\" (1.499 m).    Weight as of this encounter: 78.5 kg (173 lb).  Lab Results   Component Value Date    HGBA1C 5.9 (H) 09/10/2024         Ortho Exam  right 2nd-5th finger:   Patient presents with no obvious anatomical deformity  Skin is warm and dry to touch with no signs of erythema, ecchymosis, or infection  No soft tissue swelling or effusion noted  Full FDS, FDP, extensor mechanisms are intact  Unable to achieve composite fist  No rotational deformity with composite finger flexion  Positive palpable nodule over the A1 pulley, nodule is largest in the ring finger  Positive tenderness to palpation over A1 pulley  Positive catching. Positive clicking.    Demonstrates normal wrist, elbow, and shoulder motion  Forearm compartments are soft and supple  2+ distal radial pulse with brisk capillary refill to the fingers  Radial, median, and ulnar motor and sensory distribution intact  Sensations light to touch intact distally      Procedures  Procedures  No Procedures performed today    Diagnostics, reviewed and taken today if performed as documented:  I have personally reviewed pertinent films in PACS and my interpretation is minimal osteoarthritis of the IP joints, CMC joint, radiocarpal joints. .    Scribe Attestation      I,:  Tiana Royal am acting as a scribe while in the presence of the attending physician.:       I,:  Jeremías Melendez, DO personally performed the services described in this documentation    as scribed in my presence.:                   Portions of the record may have been created with voice recognition software.  Occasional wrong word or \"sound a like\" substitutions may have occurred due to the inherent limitations of voice recognition software.  Read " the chart carefully and recognize, using context, where substitutions have occurred.         [1]   Past Medical History:  Diagnosis Date    Bladder perforation, intraoperative     Depression     Intestine, perforation (HCC)     intra op    Sepsis (HCC)    [2]   Past Surgical History:  Procedure Laterality Date    ABDOMINAL SURGERY      BLADDER SURGERY       SECTION      COLONOSCOPY      CT NEEDLE BIOPSY RETROPERITONEUM  2020    HYSTERECTOMY  2015    INCISIONAL HERNIA REPAIR N/A 2021    Procedure: OPEN REPAIR VENTRAL/INCISIONAL HERNIA W/MESH;  Surgeon: Glendy Mcmahon MD;  Location: [facility];  Service: General    OOPHORECTOMY Bilateral    [3] No Known Allergies  [4]   Current Outpatient Medications on File Prior to Visit   Medication Sig Dispense Refill    acetaZOLAMIDE (DIAMOX) 500 mg capsule Take 2 capsules (1,000 mg total) by mouth 2 (two) times a day (Patient not taking: Reported on 4/3/2025) 180 capsule 3    amitriptyline (ELAVIL) 10 mg tablet Start with 10 mg at bed time, if no relief and able to tolerate please increase the dose to 20 mg at bed time. (Patient not taking: Reported on 4/3/2025) 90 tablet 3    Diclofenac Sodium (VOLTAREN) 1 % Apply 2 g topically 4 (four) times a day 150 g 1    eszopiclone (LUNESTA) 2 mg tablet Take 2 mg by mouth daily as needed      ferrous sulfate 325 (65 Fe) mg tablet Take 1 tablet by mouth daily with breakfast      gabapentin (Neurontin) 300 mg capsule Take 2 capsules (600 mg total) by mouth 3 (three) times a day 180 capsule 0    ibuprofen (MOTRIN) 800 mg tablet Take 1 tablet (800 mg total) by mouth 2 (two) times a day for 10 days 20 tablet 0    methylPREDNISolone 4 MG tablet therapy pack Use as directed on package 21 each 0    nicotine (NICODERM CQ) 14 mg/24hr TD 24 hr patch Place 1 patch on the skin over 24 hours every 24 hours (Patient not taking: Reported on 4/3/2025) 28 patch 0    sertraline (ZOLOFT) 100 mg tablet Take 1 tablet (50 mg) with one  100 mg tablet by mouth for a total daily dose of 150 mg.      SUMAtriptan (IMITREX) 50 mg tablet PLEASE SEE ATTACHED FOR DETAILED DIRECTIONS (Patient not taking: Reported on 4/3/2025) 9 tablet 2    tretinoin (RETIN-A) 0.025 % cream APPLY A PEA SIZED AMOUNT TO FACE AT BEDTIME.      valACYclovir (VALTREX) 1,000 mg tablet Take 2 tablets (2,000 mg total) by mouth 2 (two) times a day for 1 day 4 tablet 0     No current facility-administered medications on file prior to visit.

## 2025-06-20 ENCOUNTER — ANESTHESIA EVENT (OUTPATIENT)
Dept: PERIOP | Facility: AMBULARY SURGERY CENTER | Age: 50
End: 2025-06-20
Payer: COMMERCIAL

## 2025-06-26 ENCOUNTER — CONSULT (OUTPATIENT)
Age: 50
End: 2025-06-26
Payer: COMMERCIAL

## 2025-06-26 VITALS
TEMPERATURE: 95.1 F | HEART RATE: 79 BPM | WEIGHT: 173 LBS | OXYGEN SATURATION: 98 % | HEIGHT: 59 IN | DIASTOLIC BLOOD PRESSURE: 70 MMHG | BODY MASS INDEX: 34.88 KG/M2 | SYSTOLIC BLOOD PRESSURE: 102 MMHG

## 2025-06-26 DIAGNOSIS — E61.1 IRON DEFICIENCY: ICD-10-CM

## 2025-06-26 DIAGNOSIS — E78.2 MIXED HYPERLIPIDEMIA: ICD-10-CM

## 2025-06-26 DIAGNOSIS — M65.351 TRIGGER FINGER, RIGHT LITTLE FINGER: ICD-10-CM

## 2025-06-26 DIAGNOSIS — M65.321 TRIGGER INDEX FINGER OF RIGHT HAND: ICD-10-CM

## 2025-06-26 DIAGNOSIS — Z12.31 ENCOUNTER FOR SCREENING MAMMOGRAM FOR BREAST CANCER: ICD-10-CM

## 2025-06-26 DIAGNOSIS — Z01.818 PRE-OP EXAM: Primary | ICD-10-CM

## 2025-06-26 DIAGNOSIS — M65.331 TRIGGER FINGER, RIGHT MIDDLE FINGER: ICD-10-CM

## 2025-06-26 DIAGNOSIS — M79.641 RIGHT HAND PAIN: ICD-10-CM

## 2025-06-26 DIAGNOSIS — M65.341 TRIGGER FINGER, RIGHT RING FINGER: ICD-10-CM

## 2025-06-26 PROBLEM — I95.9 HYPOTENSION: Status: RESOLVED | Noted: 2021-03-23 | Resolved: 2025-06-26

## 2025-06-26 PROCEDURE — 99244 OFF/OP CNSLTJ NEW/EST MOD 40: CPT

## 2025-06-26 RX ORDER — FERROUS SULFATE 325(65) MG
1 TABLET ORAL
Qty: 30 TABLET | Refills: 3 | Status: SHIPPED | OUTPATIENT
Start: 2025-06-26

## 2025-06-26 NOTE — PROGRESS NOTES
Pre-operative Clearance  Name: Zoila Guerrero      : 1975      MRN: [id number]  Encounter Provider: Rich Stevenson PA-C  Encounter Date: 2025   Encounter department: [facility]    :  Assessment & Plan  Pre-op exam  Procedure: RELEASE TRIGGER FINGER pinky and middle (Right: Hand)   Anesthesia type: IV Sedation with Anesthesia   Diagnosis: Trigger finger, right little finger [M65.351]   Pre-op diagnosis: Trigger finger, right little finger [M65.351]   Location: [facility]OM 06 / [facility]   Surgeons: Jeremías Melendez DO   Pre op labs from 25 were reviewed and stable, discussed with patient   Follow anesthesia guidelines for perioperative medication management, was given information about this already with instructions   Patient is cleared for surgery without additional testing        Trigger index finger of right hand    Orders:    Ambulatory referral to Family Practice    Trigger finger, right little finger    Orders:    Ambulatory referral to Family Practice    Trigger finger, right ring finger    Orders:    Ambulatory referral to Family Practice    Trigger finger, right middle finger    Orders:    Ambulatory referral to Family Practice    Right hand pain    Orders:    Ambulatory referral to Family Practice    Encounter for screening mammogram for breast cancer    Orders:    Mammo screening bilateral w 3d and cad; Future    Iron deficiency  Refill provided   Orders:    ferrous sulfate 325 (65 Fe) mg tablet; Take 1 tablet (325 mg total) by mouth daily with breakfast    Mixed hyperlipidemia  Labs reviewed, recommend weight loss and lifestyle modifications and continue monitoring. Has appointment with bariatrics             Pre-operative Clearance:     Revised Cardiac Risk Index:  RCI RISK CLASS I (0 risk factors, risk of major cardiac complications approximately 0.5%)    Clearance:  Patient is medically optimized (CLEARED) for  proposed surgery without any additional cardiac testing.      Medication Instructions:   - Acyclovir: Continue to take this medication on your normal schedule.  - Antidepressants: Continue to take this medication on your normal schedule.  - Antiepileptic meds: Continue to take this medication on your normal schedule.  - Sleep meds (zolpidem, zaleplon, eszopiclone): Continue taking medication up to the evening before procedure/surgery, but do not take this medication on the morning of procedure/surgery.       History of Present Illness     Pre-Op Examination       Previous history of bleeding disorders or clots?: No    Previous Anesthesia reaction?: No    Prolonged steroid use in the last 6 months?: No      Assessment of Cardiac Risk:   - Unstable or severe angina or MI in the last 6 weeks or history of stent placement in the last year?: No    - Decompensated heart failure (e.g. New onset heart failure, NYHA  Class IV heart failure, or worsening existing heart failure)?: No    - Significant arrhythmias such as high grade AV block, symptomatic ventricular arrhythmia, newly recognized ventricular tachycardia, supraventricular tachycardia with resting heart rate >100, or symptomatic bradycardia?: No    - Severe heart valve disease including aortic stenosis or symptomatic mitral stenosis?: No      Pre-operative Risk Factors:  - Elevated-risk surgery: No    - History of cerebrovascular disease: No    - History of ischemic heart disease: No    - History of congestive heart failure: No    - Pre-operative treatment with insulin: No    - Pre-operative creatinine >2 mg/dL: No      Review of Systems   Constitutional:  Negative for activity change, diaphoresis, fatigue and fever.   HENT: Negative.     Eyes: Negative.    Respiratory: Negative.  Negative for cough, chest tightness and shortness of breath.    Cardiovascular:  Negative for chest pain, palpitations and leg swelling.   Gastrointestinal: Negative.    Endocrine:  "Negative.  Negative for polydipsia, polyphagia and polyuria.   Genitourinary: Negative.  Negative for dysuria, flank pain, frequency, hematuria and urgency.   Musculoskeletal:  Positive for arthralgias. Negative for back pain, joint swelling, neck pain and neck stiffness.   Skin: Negative.    Neurological: Negative.  Negative for dizziness, weakness, light-headedness and headaches.   Hematological:  Negative for adenopathy.   Psychiatric/Behavioral: Negative.  Negative for confusion and sleep disturbance. The patient is not nervous/anxious.      Past Medical History   Past Medical History[1]  Past Surgical History[2]  Family History[3]  Social History[4]  Medications[5]  No Known Allergies  Objective   /70 (Patient Position: Sitting, Cuff Size: Standard)   Pulse 79   Temp (!) 95.1 °F (35.1 °C) (Tympanic)   Ht 4' 11\" (1.499 m)   Wt 78.5 kg (173 lb)   SpO2 98%   BMI 34.94 kg/m²     Physical Exam  Vitals and nursing note reviewed.   Constitutional:       General: She is not in acute distress.     Appearance: Normal appearance. She is not ill-appearing, toxic-appearing or diaphoretic.   HENT:      Head: Normocephalic and atraumatic.      Right Ear: Tympanic membrane and external ear normal.      Left Ear: Tympanic membrane and external ear normal.      Nose: Nose normal.      Mouth/Throat:      Mouth: Mucous membranes are moist.      Pharynx: No oropharyngeal exudate or posterior oropharyngeal erythema.     Eyes:      General: No scleral icterus.        Right eye: No discharge.         Left eye: No discharge.      Extraocular Movements: Extraocular movements intact.      Conjunctiva/sclera: Conjunctivae normal.     Neck:      Vascular: No carotid bruit.     Cardiovascular:      Rate and Rhythm: Normal rate and regular rhythm.      Heart sounds: No murmur heard.  Pulmonary:      Effort: Pulmonary effort is normal. No respiratory distress.      Breath sounds: Normal breath sounds. No wheezing or rales. "     Musculoskeletal:      Cervical back: Normal range of motion and neck supple.      Right lower leg: No edema.      Left lower leg: No edema.     Skin:     Findings: No rash.     Neurological:      Mental Status: She is alert. Mental status is at baseline.     Psychiatric:         Mood and Affect: Mood normal.         Behavior: Behavior normal.         Thought Content: Thought content normal.         Judgment: Judgment normal.           Rich Stevenson PA-C         [1]   Past Medical History:  Diagnosis Date    Bladder perforation, intraoperative     Depression     Intestine, perforation (HCC)     intra op    Sepsis (HCC)    [2]   Past Surgical History:  Procedure Laterality Date    ABDOMINAL SURGERY      BLADDER SURGERY       SECTION      COLONOSCOPY      CT NEEDLE BIOPSY RETROPERITONEUM  2020    HYSTERECTOMY      INCISIONAL HERNIA REPAIR N/A 2021    Procedure: OPEN REPAIR VENTRAL/INCISIONAL HERNIA W/MESH;  Surgeon: Glendy Mcmahon MD;  Location: [facility];  Service: General    OOPHORECTOMY Bilateral    [3]   Family History  Problem Relation Name Age of Onset    No Known Problems Mother      Pancreatic cancer Father      No Known Problems Sister      No Known Problems Sister      No Known Problems Sister      No Known Problems Daughter      Cervical cancer Maternal Grandmother      No Known Problems Paternal Grandmother      No Known Problems Maternal Aunt      No Known Problems Maternal Aunt      No Known Problems Maternal Aunt      No Known Problems Maternal Aunt      No Known Problems Paternal Aunt      Pancreatic cancer Paternal Aunt      Breast cancer Neg Hx      Endometrial cancer Neg Hx      Ovarian cancer Neg Hx     [4]   Social History  Tobacco Use    Smoking status: Every Day     Current packs/day: 0.25     Average packs/day: 0.2 packs/day for 37.5 years (9.4 ttl pk-yrs)     Types: Cigarettes     Start date:      Passive exposure: Current    Smokeless  tobacco: Never   Vaping Use    Vaping status: Never Used   Substance and Sexual Activity    Alcohol use: Not Currently    Drug use: Never   [5]   Current Outpatient Medications on File Prior to Visit   Medication Sig    Diclofenac Sodium (VOLTAREN) 1 % Apply 2 g topically 4 (four) times a day    eszopiclone (LUNESTA) 2 mg tablet Take 2 mg by mouth daily as needed    ferrous sulfate 325 (65 Fe) mg tablet Take 1 tablet by mouth daily with breakfast    gabapentin (Neurontin) 300 mg capsule Take 2 capsules (600 mg total) by mouth 3 (three) times a day    sertraline (ZOLOFT) 100 mg tablet     tretinoin (RETIN-A) 0.025 % cream     valACYclovir (VALTREX) 1,000 mg tablet Take 2 tablets (2,000 mg total) by mouth 2 (two) times a day for 1 day    acetaZOLAMIDE (DIAMOX) 500 mg capsule Take 2 capsules (1,000 mg total) by mouth 2 (two) times a day (Patient not taking: Reported on 4/3/2025)    amitriptyline (ELAVIL) 10 mg tablet Start with 10 mg at bed time, if no relief and able to tolerate please increase the dose to 20 mg at bed time. (Patient not taking: Reported on 4/3/2025)    ibuprofen (MOTRIN) 800 mg tablet Take 1 tablet (800 mg total) by mouth 2 (two) times a day for 10 days    methylPREDNISolone 4 MG tablet therapy pack Use as directed on package    nicotine (NICODERM CQ) 14 mg/24hr TD 24 hr patch Place 1 patch on the skin over 24 hours every 24 hours (Patient not taking: Reported on 4/3/2025)    SUMAtriptan (IMITREX) 50 mg tablet PLEASE SEE ATTACHED FOR DETAILED DIRECTIONS (Patient not taking: Reported on 4/3/2025)

## 2025-07-01 ENCOUNTER — ANESTHESIA (OUTPATIENT)
Dept: PERIOP | Facility: AMBULARY SURGERY CENTER | Age: 50
End: 2025-07-01
Payer: COMMERCIAL

## 2025-07-01 ENCOUNTER — HOSPITAL ENCOUNTER (OUTPATIENT)
Facility: AMBULARY SURGERY CENTER | Age: 50
Setting detail: OUTPATIENT SURGERY
Discharge: HOME | End: 2025-07-01
Attending: ORTHOPAEDIC SURGERY | Admitting: ORTHOPAEDIC SURGERY
Payer: COMMERCIAL

## 2025-07-01 VITALS
HEIGHT: 61 IN | DIASTOLIC BLOOD PRESSURE: 70 MMHG | BODY MASS INDEX: 32.66 KG/M2 | SYSTOLIC BLOOD PRESSURE: 116 MMHG | OXYGEN SATURATION: 97 % | RESPIRATION RATE: 18 BRPM | HEART RATE: 60 BPM | WEIGHT: 173 LBS | TEMPERATURE: 97.2 F

## 2025-07-01 PROCEDURE — 26055 INCISE FINGER TENDON SHEATH: CPT | Performed by: ORTHOPAEDIC SURGERY

## 2025-07-01 PROCEDURE — 26055 INCISE FINGER TENDON SHEATH: CPT

## 2025-07-01 RX ORDER — SODIUM CHLORIDE, SODIUM LACTATE, POTASSIUM CHLORIDE, CALCIUM CHLORIDE 600; 310; 30; 20 MG/100ML; MG/100ML; MG/100ML; MG/100ML
125 INJECTION, SOLUTION INTRAVENOUS CONTINUOUS
Status: DISCONTINUED | OUTPATIENT
Start: 2025-07-01 | End: 2025-07-01 | Stop reason: HOSPADM

## 2025-07-01 RX ORDER — PROPOFOL 10 MG/ML
INJECTION, EMULSION INTRAVENOUS CONTINUOUS PRN
Status: DISCONTINUED | OUTPATIENT
Start: 2025-07-01 | End: 2025-07-01

## 2025-07-01 RX ORDER — SODIUM CHLORIDE, SODIUM LACTATE, POTASSIUM CHLORIDE, CALCIUM CHLORIDE 600; 310; 30; 20 MG/100ML; MG/100ML; MG/100ML; MG/100ML
20 INJECTION, SOLUTION INTRAVENOUS CONTINUOUS
Status: DISCONTINUED | OUTPATIENT
Start: 2025-07-01 | End: 2025-07-01 | Stop reason: HOSPADM

## 2025-07-01 RX ORDER — MIDAZOLAM HYDROCHLORIDE 2 MG/2ML
INJECTION, SOLUTION INTRAMUSCULAR; INTRAVENOUS AS NEEDED
Status: DISCONTINUED | OUTPATIENT
Start: 2025-07-01 | End: 2025-07-01

## 2025-07-01 RX ORDER — FENTANYL CITRATE/PF 50 MCG/ML
25 SYRINGE (ML) INJECTION
Status: DISCONTINUED | OUTPATIENT
Start: 2025-07-01 | End: 2025-07-01 | Stop reason: HOSPADM

## 2025-07-01 RX ORDER — CEFAZOLIN SODIUM 2 G/50ML
2000 SOLUTION INTRAVENOUS ONCE
Status: COMPLETED | OUTPATIENT
Start: 2025-07-01 | End: 2025-07-01

## 2025-07-01 RX ORDER — CHLORHEXIDINE GLUCONATE ORAL RINSE 1.2 MG/ML
15 SOLUTION DENTAL ONCE
Status: COMPLETED | OUTPATIENT
Start: 2025-07-01 | End: 2025-07-01

## 2025-07-01 RX ORDER — ALBUTEROL SULFATE 0.83 MG/ML
2.5 SOLUTION RESPIRATORY (INHALATION) ONCE AS NEEDED
Status: DISCONTINUED | OUTPATIENT
Start: 2025-07-01 | End: 2025-07-01 | Stop reason: HOSPADM

## 2025-07-01 RX ORDER — ONDANSETRON 2 MG/ML
4 INJECTION INTRAMUSCULAR; INTRAVENOUS ONCE AS NEEDED
Status: DISCONTINUED | OUTPATIENT
Start: 2025-07-01 | End: 2025-07-01 | Stop reason: HOSPADM

## 2025-07-01 RX ORDER — GLYCOPYRROLATE 0.2 MG/ML
INJECTION INTRAMUSCULAR; INTRAVENOUS AS NEEDED
Status: DISCONTINUED | OUTPATIENT
Start: 2025-07-01 | End: 2025-07-01

## 2025-07-01 RX ORDER — LABETALOL HYDROCHLORIDE 5 MG/ML
5 INJECTION, SOLUTION INTRAVENOUS
Status: DISCONTINUED | OUTPATIENT
Start: 2025-07-01 | End: 2025-07-01 | Stop reason: HOSPADM

## 2025-07-01 RX ORDER — MAGNESIUM HYDROXIDE 1200 MG/15ML
LIQUID ORAL AS NEEDED
Status: DISCONTINUED | OUTPATIENT
Start: 2025-07-01 | End: 2025-07-01 | Stop reason: HOSPADM

## 2025-07-01 RX ORDER — CHLORHEXIDINE GLUCONATE 40 MG/ML
SOLUTION TOPICAL DAILY PRN
Status: DISCONTINUED | OUTPATIENT
Start: 2025-07-01 | End: 2025-07-01 | Stop reason: HOSPADM

## 2025-07-01 RX ORDER — FENTANYL CITRATE 50 UG/ML
INJECTION, SOLUTION INTRAMUSCULAR; INTRAVENOUS AS NEEDED
Status: DISCONTINUED | OUTPATIENT
Start: 2025-07-01 | End: 2025-07-01

## 2025-07-01 RX ORDER — ONDANSETRON 2 MG/ML
INJECTION INTRAMUSCULAR; INTRAVENOUS AS NEEDED
Status: DISCONTINUED | OUTPATIENT
Start: 2025-07-01 | End: 2025-07-01

## 2025-07-01 RX ORDER — KETOROLAC TROMETHAMINE 30 MG/ML
INJECTION, SOLUTION INTRAMUSCULAR; INTRAVENOUS AS NEEDED
Status: DISCONTINUED | OUTPATIENT
Start: 2025-07-01 | End: 2025-07-01

## 2025-07-01 RX ORDER — SODIUM CHLORIDE, SODIUM LACTATE, POTASSIUM CHLORIDE, CALCIUM CHLORIDE 600; 310; 30; 20 MG/100ML; MG/100ML; MG/100ML; MG/100ML
INJECTION, SOLUTION INTRAVENOUS CONTINUOUS PRN
Status: DISCONTINUED | OUTPATIENT
Start: 2025-07-01 | End: 2025-07-01

## 2025-07-01 RX ORDER — KETAMINE HCL IN NACL, ISO-OSM 100MG/10ML
SYRINGE (ML) INJECTION AS NEEDED
Status: DISCONTINUED | OUTPATIENT
Start: 2025-07-01 | End: 2025-07-01

## 2025-07-01 RX ORDER — OXYCODONE HYDROCHLORIDE 5 MG/1
5 TABLET ORAL EVERY 4 HOURS PRN
Refills: 0 | Status: DISCONTINUED | OUTPATIENT
Start: 2025-07-01 | End: 2025-07-01 | Stop reason: HOSPADM

## 2025-07-01 RX ORDER — BUPIVACAINE HYDROCHLORIDE 2.5 MG/ML
INJECTION, SOLUTION EPIDURAL; INFILTRATION; INTRACAUDAL; PERINEURAL AS NEEDED
Status: DISCONTINUED | OUTPATIENT
Start: 2025-07-01 | End: 2025-07-01 | Stop reason: HOSPADM

## 2025-07-01 RX ORDER — PROPOFOL 10 MG/ML
INJECTION, EMULSION INTRAVENOUS AS NEEDED
Status: DISCONTINUED | OUTPATIENT
Start: 2025-07-01 | End: 2025-07-01

## 2025-07-01 RX ORDER — PROMETHAZINE HYDROCHLORIDE 25 MG/ML
12.5 INJECTION, SOLUTION INTRAMUSCULAR; INTRAVENOUS ONCE AS NEEDED
Status: DISCONTINUED | OUTPATIENT
Start: 2025-07-01 | End: 2025-07-01 | Stop reason: HOSPADM

## 2025-07-01 RX ORDER — HYDROMORPHONE HCL/PF 1 MG/ML
0.5 SYRINGE (ML) INJECTION
Status: DISCONTINUED | OUTPATIENT
Start: 2025-07-01 | End: 2025-07-01 | Stop reason: HOSPADM

## 2025-07-01 RX ADMIN — CEFAZOLIN SODIUM 2000 MG: 2 SOLUTION INTRAVENOUS at 10:45

## 2025-07-01 RX ADMIN — FENTANYL CITRATE 50 MCG: 50 INJECTION INTRAMUSCULAR; INTRAVENOUS at 10:51

## 2025-07-01 RX ADMIN — CHLORHEXIDINE GLUCONATE 15 ML: 1.2 SOLUTION ORAL at 10:04

## 2025-07-01 RX ADMIN — SODIUM CHLORIDE, SODIUM LACTATE, POTASSIUM CHLORIDE, AND CALCIUM CHLORIDE: .6; .31; .03; .02 INJECTION, SOLUTION INTRAVENOUS at 10:19

## 2025-07-01 RX ADMIN — KETOROLAC TROMETHAMINE 15 MG: 30 INJECTION, SOLUTION INTRAMUSCULAR; INTRAVENOUS at 11:09

## 2025-07-01 RX ADMIN — FENTANYL CITRATE 50 MCG: 50 INJECTION INTRAMUSCULAR; INTRAVENOUS at 11:06

## 2025-07-01 RX ADMIN — Medication 10 MG: at 10:55

## 2025-07-01 RX ADMIN — SODIUM CHLORIDE, SODIUM LACTATE, POTASSIUM CHLORIDE, AND CALCIUM CHLORIDE 20 ML/HR: .6; .31; .03; .02 INJECTION, SOLUTION INTRAVENOUS at 10:04

## 2025-07-01 RX ADMIN — ONDANSETRON 4 MG: 2 INJECTION INTRAMUSCULAR; INTRAVENOUS at 10:45

## 2025-07-01 RX ADMIN — OXYCODONE HYDROCHLORIDE 5 MG: 5 TABLET ORAL at 12:00

## 2025-07-01 RX ADMIN — GLYCOPYRROLATE 0.1 MG: 0.2 INJECTION INTRAMUSCULAR; INTRAVENOUS at 10:45

## 2025-07-01 RX ADMIN — MIDAZOLAM 2 MG: 1 INJECTION INTRAMUSCULAR; INTRAVENOUS at 10:45

## 2025-07-01 RX ADMIN — PROPOFOL 40 MG: 10 INJECTION, EMULSION INTRAVENOUS at 10:51

## 2025-07-01 RX ADMIN — PROPOFOL 150 MCG/KG/MIN: 10 INJECTION, EMULSION INTRAVENOUS at 10:51

## 2025-07-01 NOTE — DISCHARGE INSTR - AVS FIRST PAGE
Discharge Instructions - Orthopedics  Zoila Guerrero 49 y.o. female MRN: [id number]  Unit/Bed#: PACU    Weight Bearing Status:                                           Should allow limited use of R hand while bandages are on     DVT prophylaxis:  N/A    Pain:  Continue analgesics as directed    Dressing Instructions:   Please keep clean, dry and intact until follow up     Appt Instructions:   If you do not have your appointment, please call the clinic at [phone]  Otherwise follow up as scheduled.    Contact the office sooner if you experience any increased numbness/tingling in the extremities.      Miscellaneous:  Follow up outpatient with Dr. Melendez

## 2025-07-01 NOTE — OP NOTE
OPERATIVE REPORT  PATIENT NAME: Zoila Guerrero    :  1975  MRN: [id number]  Pt Location: [facility]    SURGERY DATE: 2025    Surgeons and Role:     * Jeremías Melendez DO - Primary     * Megan Simpson PA-C - Assisting utilized during the case for assistance with prepping draping positioning retraction of soft tissue as well as closure of wound and dressing application  Danii Guardado PA-C student observed during the case    Preop Diagnosis:  Trigger finger, right little finger [M65.351]    Post-Op Diagnosis Codes:     * Trigger finger, right little finger [M65.351]    Procedure(s):  Right - RELEASE TRIGGER FINGER pinky and middle    Specimen(s):  * No specimens in log *    Estimated Blood Loss:   Minimal    Drains:  * No LDAs found *    Anesthesia Type:   IV Sedation with Anesthesia    Operative Indications:  Trigger finger, right little finger [M65.351]      Operative Findings:  Stenotic A1 pulley on both the middle and pinky finger but there was also thickened tenosynovium on the middle finger.       Complications:   None    Procedure and Technique:  Patient was identified in the preoperative area the right upper extremity is marked the consent H&P had been reviewed with the patient.  The patient was then brought back to the operative suite where she was sedated by anesthesia.  A timeout commenced and confirmed the right upper extremity right middle and pinky finger.  We injected local anesthetic quarter percent Marcaine 10 cc split between the 2 incision areas.  The timeout was repeated and the Esmarch was utilized to exsanguinate the right upper extremity.  The tourniquet was turned up of at 250 mmHg for total of 7 minutes.  An incision in line with the middle finger was made over the distal palmar crease.  We used tenotomy scissors to spread the tissue longitudinally and then used a freer elevator to dissect bluntly down to the tendon and A1 pulley.  We identified the A1 pulley and use her  tenotomy scissors to cut through the A1 pulley.  We then used our Somerset elevator to assess our superficialis and profundus tendons as a traveled and did not find any triggering.  We then moved onto our pinky where we made a another incision in line with the pinky digit over the distal palmar crease.  We again use tenotomy scissors to spread the soft tissue and then the [place] elevator to dissect down to the A1 pulley.  We identified our A1 pulley and our tendons incised using the tenotomy scissors completed the incision of the A1 pulley and then assessed by pulling the superficialis and profundus tendon into the wound and found that there was no triggering.  The tourniquet was turned down electrocautery device was used to cauterize any superficial bleeders.  We closed the incision with 3-0 nylon and then Xeroform with a piece of a 4 x 4 and a Tegaderm were applied for both incisions.  Anesthesia was reversed and the patient was taken back to PACU in stable condition.   I was present for the entire procedure. and A qualified resident physician was not available.    Patient Disposition:  PACU          SIGNATURE: Jeremías Melendez DO  DATE: July 1, 2025  TIME: 11:09 AM

## 2025-07-01 NOTE — ANESTHESIA POSTPROCEDURE EVALUATION
Post-Op Assessment Note    CV Status:  Stable    Pain management: adequate       Mental Status:  Alert and awake   Hydration Status:  Euvolemic   PONV Controlled:  Controlled   Airway Patency:  Patent     Post Op Vitals Reviewed: Yes    No anethesia notable event occurred.    Staff: Anesthesiologist           Last Filed PACU Vitals:  Vitals Value Taken Time   Temp 97 °F (36.1 °C) 07/01/25 11:35   Pulse 62 07/01/25 11:38   BP 98/56 07/01/25 11:36   Resp 19 07/01/25 11:38   SpO2 95 % 07/01/25 11:38   Vitals shown include unfiled device data.    Modified Omari:     Vitals Value Taken Time   Activity 2 07/01/25 11:35   Respiration 2 07/01/25 11:35   Circulation 2 07/01/25 11:35   Consciousness 1 07/01/25 11:35   Oxygen Saturation 2 07/01/25 11:35     Modified Omari Score: 9

## 2025-07-01 NOTE — ANESTHESIA PREPROCEDURE EVALUATION
Procedure:  RELEASE TRIGGER FINGER pinky and middle (Right: Hand)    Relevant Problems   MUSCULOSKELETAL   (+) Arthritis   (+) Chronic bilateral thoracic back pain   (+) Fibromyalgia      NEURO/PSYCH   (+) Chronic bilateral thoracic back pain   (+) Chronic pain syndrome   (+) Fibromyalgia   (+) Generalized anxiety disorder   (+) MDD (major depressive disorder), recurrent, severe, with psychosis (HCC)        Physical Exam    Airway     Mallampati score: II  TM Distance: >3 FB  Neck ROM: full      Cardiovascular      Dental       Pulmonary      Neurological      Other Findings  post-pubertal.      Anesthesia Plan  ASA Score- 2     Anesthesia Type- IV sedation with anesthesia with ASA Monitors.         Additional Monitors:     Airway Plan: natural airway.    Comment: I have seen the patient and reviewed the history.  Patient to receive IV sedation with full ASA monitors.  Risks discussed with the patient, consent signed.  .       Plan Factors-Exercise tolerance (METS): >4 METS.    Chart reviewed.   Existing labs reviewed. Patient summary reviewed.                  Induction- intravenous.    Postoperative Plan- .   Monitoring Plan - Monitoring plan - standard ASA monitoring  Post Operative Pain Plan - multimodal analgesia        Informed Consent- Anesthetic plan and risks discussed with patient.  I personally reviewed this patient with the CRNA. Discussed and agreed on the Anesthesia Plan with the CRNA..

## 2025-07-01 NOTE — INTERVAL H&P NOTE
H&P reviewed. After examining the patient I find no changes in the patients condition since the H&P had been written.    Vitals:    07/01/25 0947   BP: 112/72   Pulse: 67   Resp: 17   Temp: (!) 96.9 °F (36.1 °C)   SpO2: 98%

## 2025-07-01 NOTE — ANESTHESIA POSTPROCEDURE EVALUATION
Post-Op Assessment Note    CV Status:  Stable  Pain Score: 0    Pain management: adequate       Mental Status:  Alert and awake   Hydration Status:  Euvolemic   PONV Controlled:  Controlled   Airway Patency:  Patent     Post Op Vitals Reviewed: Yes    No anethesia notable event occurred.    Staff: CRNA           Last Filed PACU Vitals:  Vitals Value Taken Time   Temp     Pulse 70 07/01/25 11:20   /67    Resp 10 07/01/25 11:20   SpO2 91 % 07/01/25 11:20   Vitals shown include unfiled device data.

## 2025-07-14 ENCOUNTER — OFFICE VISIT (OUTPATIENT)
Dept: OBGYN CLINIC | Facility: CLINIC | Age: 50
End: 2025-07-14

## 2025-07-14 DIAGNOSIS — Z98.890 S/P TRIGGER FINGER RELEASE: Primary | ICD-10-CM

## 2025-07-14 PROCEDURE — 99024 POSTOP FOLLOW-UP VISIT: CPT | Performed by: ORTHOPAEDIC SURGERY

## 2025-07-14 NOTE — PROGRESS NOTES
Name: Zoila Guerrero      : 1975      MRN: [id number]  Encounter Provider: Jeremías Melendez DO  Encounter Date: 2025   Encounter department: [facility] [place]        Assessment:   2 weeks Status Post  RELEASE TRIGGER FINGER pinky and middle - Right on 2025        :  Assessment & Plan  S/P trigger finger release  Physical exam, diagnostic imaging, and subjective history are all most consistent with the above diagnosis. The pathoanatomy and natural history of this diagnosis was explained to the patient in detail.   Sutures removed without complication.  May resume showering and allowing soap and water to run over the area  Continue to avoid submersion in standing water until incisions are healed  Perform scar tissue massage up to 5 minutes at a time up to 4 times a day  Follow-up in 4 weeks for reexamination.    Orders:    Ambulatory Referral to PT/OT Hand Therapy; Future            SUBJECTIVE:  Patient is a 49 y.o. year old female who presents 2 weeks status post RELEASE TRIGGER FINGER pinky and middle - Right.       PHYSICAL EXAMINATION:    MUSCULOSKELETAL EXAMINATION:   General: Alert and oriented x 3, WNWD, NAD  Incision: Clean, dry, intact, healing well  No kayla-incisional erythema  no drainage or purulence  No fluctuance or swelling   Range of Motion: As expected        PROCEDURES PERFORMED:  Suture removal    Date/Time: 2025 10:15 AM    Performed by: Laura Roberson PA-C  Authorized by: Jeremías Melendez DO    Universal Protocol:  Consent: Verbal consent obtained  Risks and benefits: risks, benefits and alternatives were discussed  Consent given by: patient  Patient understanding: patient states understanding of the procedure being performed      Patient location:  Clinic  Location:     Laterality:  Right    Location:  Upper extremity    Upper extremity location:  Hand  Procedure details:     Tools used:  Suture removal kit    Wound appearance:  No sign(s)  of infection, good wound healing and clean    Number of sutures removed:  4  Post-procedure details:     Post-removal:  Steri-Strips applied    Patient tolerance of procedure:  Tolerated well, no immediate complications    -

## 2025-07-22 ENCOUNTER — OFFICE VISIT (OUTPATIENT)
Dept: BARIATRICS | Facility: CLINIC | Age: 50
End: 2025-07-22
Payer: COMMERCIAL

## 2025-07-22 VITALS
HEART RATE: 75 BPM | RESPIRATION RATE: 16 BRPM | BODY MASS INDEX: 35.02 KG/M2 | HEIGHT: 60 IN | SYSTOLIC BLOOD PRESSURE: 138 MMHG | DIASTOLIC BLOOD PRESSURE: 82 MMHG | WEIGHT: 178.4 LBS

## 2025-07-22 DIAGNOSIS — E66.9 OBESITY (BMI 30-39.9): Primary | ICD-10-CM

## 2025-07-22 PROCEDURE — 99203 OFFICE O/P NEW LOW 30 MIN: CPT

## 2025-07-22 NOTE — ASSESSMENT & PLAN NOTE
- Discussed options of HealthyCORE-Intensive Lifestyle Intervention Program, Very Low Calorie Diet-VLCD, and Conservative Program and the role of weight loss medications.  - Patient is interested in pursuing Conservative Program and follow up visits with medical weight management provider.  - Explained the importance of making lifestyle changes in addition to starting any anti-obesity medications.   - Initial weight loss goal of 5-10% weight loss for improved health. Weight loss can improve patient's co-morbid conditions and/or prevent weight-related complications.  - Weight is not at goal and patient has been unable to achieve a meaningful weight loss above 5% using various programs and tools for more than 6 months  - Labs reviewed from 6/2025    General Recommendations:  Nutrition:  Eat breakfast daily.  Do not skip meals.      Food log (ie.) www.myfitnesspal.com, sparkpeople.com, loseit.com, calorieking.com, etc.     Practice mindful eating.  Be sure to set aside time to eat, eat slowly, and savor your food.     Hydration:    At least 64oz of water daily.  No sugar sweetened beverages.  No juice (eat the fruit instead).     Exercise:  Studies have shown that the ideal exercise goal is somewhere between 150 to 300 minutes of moderate intensity exercise a week.  Start with exercising 10 minutes every other day and gradually increase physical activity with a goal of at least 150 minutes of moderate intensity exercise a week, divided over at least 3 days a week.  An example of this would be exercising 30 minutes a day, 5 days a week.  Resistance training can increase muscle mass and increase our resting metabolic rate.   FULL BODY resistance training is recommended 2-3 times a week.  Do not do this on consecutive days to allow for muscle recovery.     Aim for a bare minimum 5000 steps, even on days you do not exercise.     Monitoring:   Weigh yourself daily.  If this causes undue stress, then just weigh yourself once  a week.  Weigh yourself the same time of the day with the same amount of clothing on.  Preferably this should be done after waking up, before you eat, and with no clothing or minimal clothing on.     Specific Goals:  Calorie goal:  [id number] emmanuelle/day (Provided with meal plan to follow)   -Discussed her nutrition and she is encouraged to track her food.  -Discussed increasing her protein intake and fiber intake.   -Discussed increasing her water intake.   -Discussed increasing he physical activity.   -Discussed medications and she is interested in GLP1s like wegovy.   Patient denies personal history of pancreatitis. Patient also denies personal and family history of medullary thyroid cancer and multiple endocrine neoplasia type 2 (MEN 2 tumor).   Wegovy Instructions:    - Begin Wegovy 0.25 mg subcutaneously once a week. Dose changes may occur after 4 doses if medication is tolerated. You will be assessed prior to each dose change to make sure you are tolerating the medication well.  - Please message me when you have 2 pens left from the prescription so there are no lapses in treatment.  - If you have been off the medication for more than 14 days please contact the office as you will need to restart the titration at the starting dose again to avoid significant side effects or adverse events.  - Visit wegovy.com for further information/injection instructions.   -Please eat small frequent meals to help reduce nausea. Lemon water and saltine crackers may help with this.   - Side effects of Wegovy discussed: nausea, vomiting, diarrhea, and constipation. If you experience fever, nausea/vomiting, and pain radiating to your back this may be a sign of pancreatitis. Please go to the emergency room if this occurs.  - Patient understands the side effects of the medication and proper administration. Patient agrees with the treatment plan and all questions were answered.

## 2025-07-24 ENCOUNTER — HOSPITAL ENCOUNTER (OUTPATIENT)
Age: 50
Discharge: HOME | End: 2025-07-24
Payer: COMMERCIAL

## 2025-07-24 DIAGNOSIS — Z12.31 ENCOUNTER FOR SCREENING MAMMOGRAM FOR BREAST CANCER: ICD-10-CM

## 2025-07-24 PROCEDURE — 77063 BREAST TOMOSYNTHESIS BI: CPT

## 2025-07-24 PROCEDURE — 77067 SCR MAMMO BI INCL CAD: CPT

## 2025-07-30 ENCOUNTER — TELEPHONE (OUTPATIENT)
Age: 50
End: 2025-07-30

## 2025-07-30 DIAGNOSIS — E61.1 IRON DEFICIENCY: ICD-10-CM

## 2025-07-30 DIAGNOSIS — B00.1 COLD SORE: ICD-10-CM

## 2025-07-31 ENCOUNTER — TELEPHONE (OUTPATIENT)
Age: 50
End: 2025-07-31

## 2025-07-31 RX ORDER — VALACYCLOVIR HYDROCHLORIDE 1 G/1
2000 TABLET, FILM COATED ORAL 2 TIMES DAILY
Qty: 4 TABLET | Refills: 0 | Status: SHIPPED | OUTPATIENT
Start: 2025-07-31 | End: 2025-08-01

## 2025-07-31 RX ORDER — FERROUS SULFATE 325(65) MG
1 TABLET ORAL
Qty: 30 TABLET | Refills: 5 | Status: SHIPPED | OUTPATIENT
Start: 2025-07-31

## 2025-08-14 ENCOUNTER — OFFICE VISIT (OUTPATIENT)
Dept: OBGYN CLINIC | Facility: CLINIC | Age: 50
End: 2025-08-14

## 2025-08-14 PROBLEM — Z98.890 S/P TRIGGER FINGER RELEASE: Status: ACTIVE | Noted: 2025-08-14

## 2025-08-19 ENCOUNTER — APPOINTMENT (OUTPATIENT)
Dept: RADIOLOGY | Facility: HOSPITAL | Age: 50
End: 2025-08-19
Payer: COMMERCIAL

## 2025-08-19 ENCOUNTER — HOSPITAL ENCOUNTER (EMERGENCY)
Facility: HOSPITAL | Age: 50
Discharge: HOME | End: 2025-08-19
Attending: EMERGENCY MEDICINE | Admitting: EMERGENCY MEDICINE
Payer: COMMERCIAL

## 2025-08-19 VITALS
WEIGHT: 165 LBS | SYSTOLIC BLOOD PRESSURE: 135 MMHG | BODY MASS INDEX: 31.15 KG/M2 | TEMPERATURE: 98.9 F | HEART RATE: 60 BPM | DIASTOLIC BLOOD PRESSURE: 65 MMHG | OXYGEN SATURATION: 98 % | RESPIRATION RATE: 22 BRPM | HEIGHT: 61 IN

## 2025-08-19 DIAGNOSIS — M25.512 ACUTE PAIN OF LEFT SHOULDER: Primary | ICD-10-CM

## 2025-08-19 PROCEDURE — 99284 EMERGENCY DEPT VISIT MOD MDM: CPT

## 2025-08-19 PROCEDURE — 73030 X-RAY EXAM OF SHOULDER: CPT

## 2025-08-19 PROCEDURE — 99283 EMERGENCY DEPT VISIT LOW MDM: CPT

## 2025-08-19 PROCEDURE — 96372 THER/PROPH/DIAG INJ SC/IM: CPT

## 2025-08-19 RX ORDER — METHOCARBAMOL 500 MG/1
500 TABLET, FILM COATED ORAL ONCE
Status: COMPLETED | OUTPATIENT
Start: 2025-08-19 | End: 2025-08-19

## 2025-08-19 RX ORDER — KETOROLAC TROMETHAMINE 30 MG/ML
15 INJECTION, SOLUTION INTRAMUSCULAR; INTRAVENOUS ONCE
Status: COMPLETED | OUTPATIENT
Start: 2025-08-19 | End: 2025-08-19

## 2025-08-19 RX ORDER — OXYCODONE AND ACETAMINOPHEN 5; 325 MG/1; MG/1
1 TABLET ORAL ONCE
Refills: 0 | Status: COMPLETED | OUTPATIENT
Start: 2025-08-19 | End: 2025-08-19

## 2025-08-19 RX ORDER — ACETAMINOPHEN 500 MG
1000 TABLET ORAL EVERY 8 HOURS
Qty: 40 TABLET | Refills: 0 | Status: SHIPPED | OUTPATIENT
Start: 2025-08-19

## 2025-08-19 RX ORDER — METHOCARBAMOL 500 MG/1
500 TABLET, FILM COATED ORAL 2 TIMES DAILY
Qty: 20 TABLET | Refills: 0 | Status: SHIPPED | OUTPATIENT
Start: 2025-08-19 | End: 2025-08-25 | Stop reason: SDUPTHER

## 2025-08-19 RX ORDER — IBUPROFEN 600 MG/1
600 TABLET, FILM COATED ORAL EVERY 6 HOURS PRN
Qty: 30 TABLET | Refills: 0 | Status: SHIPPED | OUTPATIENT
Start: 2025-08-19

## 2025-08-19 RX ADMIN — OXYCODONE HYDROCHLORIDE AND ACETAMINOPHEN 1 TABLET: 5; 325 TABLET ORAL at 21:44

## 2025-08-19 RX ADMIN — KETOROLAC TROMETHAMINE 15 MG: 30 INJECTION, SOLUTION INTRAMUSCULAR at 21:44

## 2025-08-19 RX ADMIN — METHOCARBAMOL 500 MG: 500 TABLET ORAL at 21:44

## 2025-08-21 PROBLEM — E66.01 MORBID OBESITY (HCC): Status: ACTIVE | Noted: 2025-08-21

## (undated) DEVICE — SUT MONOCRYL PLUS 4-0 PS-2 18 IN MCP496G

## (undated) DEVICE — SUT VICRYL 3-0 SH 27 IN J416H

## (undated) DEVICE — DRESSING MEPILEX AG 4 X 5 IN

## (undated) DEVICE — SUT PROLENE 2-0 CT-2 30 IN 8411H

## (undated) DEVICE — SUT VICRYL 2-0 18 IN J911T

## (undated) DEVICE — Device

## (undated) DEVICE — GLOVE SRG BIOGEL 7

## (undated) DEVICE — SUT VICRYL 2-0 CT-1 27 IN J259H

## (undated) DEVICE — STERILE BETHLEHEM PLASTIC HAND: Brand: CARDINAL HEALTH

## (undated) DEVICE — DRESSING MEPORE FILM ADHESIVE 4 X 5IN

## (undated) DEVICE — TIBURON LAPAROSCOPIC ABDOMINAL DRAPE: Brand: CONVERTORS

## (undated) DEVICE — INTENDED FOR TISSUE SEPARATION, AND OTHER PROCEDURES THAT REQUIRE A SHARP SURGICAL BLADE TO PUNCTURE OR CUT.: Brand: BARD-PARKER ® CARBON RIB-BACK BLADES

## (undated) DEVICE — 3M™ STERI-STRIP™ REINFORCED ADHESIVE SKIN CLOSURES, R1542, 1/4 IN X 1-1/2 IN (6 MM X 38 MM), 6 STRIPS/ENVELOPE: Brand: 3M™ STERI-STRIP™

## (undated) DEVICE — ADHESIVE SKIN HIGH VISCOSITY EXOFIN 1ML

## (undated) DEVICE — INTENDED FOR TISSUE SEPARATION, AND OTHER PROCEDURES THAT REQUIRE A SHARP SURGICAL BLADE TO PUNCTURE OR CUT.: Brand: BARD-PARKER SAFETY BLADES SIZE 15, STERILE

## (undated) DEVICE — DRAPE EQUIPMENT RF WAND

## (undated) DEVICE — CHLORAPREP HI-LITE 26ML ORANGE

## (undated) DEVICE — INTERCEED

## (undated) DEVICE — BETHLEHEM UNIVERSAL MINOR GEN: Brand: CARDINAL HEALTH

## (undated) DEVICE — POOLE SUCTION HANDLE: Brand: CARDINAL HEALTH

## (undated) DEVICE — SCD SEQUENTIAL COMPRESSION COMFORT SLEEVE MEDIUM KNEE LENGTH: Brand: KENDALL SCD

## (undated) DEVICE — PAD GROUNDING ADULT

## (undated) DEVICE — LIGHT HANDLE COVER SLEEVE DISP BLUE STELLAR

## (undated) DEVICE — ROSEBUD DISSECTORS: Brand: DEROYAL

## (undated) DEVICE — POV-IOD SWAB STICKS